# Patient Record
Sex: FEMALE | Race: BLACK OR AFRICAN AMERICAN | NOT HISPANIC OR LATINO | Employment: UNEMPLOYED | URBAN - METROPOLITAN AREA
[De-identification: names, ages, dates, MRNs, and addresses within clinical notes are randomized per-mention and may not be internally consistent; named-entity substitution may affect disease eponyms.]

---

## 2017-01-04 ENCOUNTER — HISTORICAL (OUTPATIENT)
Dept: RADIOLOGY | Facility: HOSPITAL | Age: 57
End: 2017-01-04

## 2017-01-26 ENCOUNTER — HISTORICAL (OUTPATIENT)
Dept: WOUND CARE | Facility: HOSPITAL | Age: 57
End: 2017-01-26

## 2017-02-17 ENCOUNTER — HISTORICAL (OUTPATIENT)
Dept: WOUND CARE | Facility: HOSPITAL | Age: 57
End: 2017-02-17

## 2017-07-11 ENCOUNTER — HISTORICAL (OUTPATIENT)
Dept: ADMINISTRATIVE | Facility: HOSPITAL | Age: 57
End: 2017-07-11

## 2017-07-11 LAB
BUN SERPL-MCNC: 16 MG/DL (ref 7–18)
CALCIUM SERPL-MCNC: 9.2 MG/DL (ref 8.5–10.1)
CHLORIDE SERPL-SCNC: 110 MMOL/L (ref 98–107)
CO2 SERPL-SCNC: 26 MMOL/L (ref 21–32)
CREAT SERPL-MCNC: 1.1 MG/DL (ref 0.6–1.3)
GLUCOSE SERPL-MCNC: 91 MG/DL (ref 74–106)
POTASSIUM SERPL-SCNC: 4 MMOL/L (ref 3.5–5.1)
SODIUM SERPL-SCNC: 142 MMOL/L (ref 136–145)
T3FREE SERPL-MCNC: 2.47 PG/ML (ref 2.18–3.98)
T4 FREE SERPL-MCNC: 1 NG/DL (ref 0.76–1.46)
TSH SERPL-ACNC: 1.07 MIU/L (ref 0.36–3.74)

## 2017-08-09 ENCOUNTER — HISTORICAL (OUTPATIENT)
Dept: INTERNAL MEDICINE | Facility: CLINIC | Age: 57
End: 2017-08-09

## 2017-09-18 ENCOUNTER — HISTORICAL (OUTPATIENT)
Dept: ADMINISTRATIVE | Facility: HOSPITAL | Age: 57
End: 2017-09-18

## 2017-09-18 LAB — TSH SERPL-ACNC: 0.7 MIU/L (ref 0.36–3.74)

## 2017-12-18 ENCOUNTER — HISTORICAL (OUTPATIENT)
Dept: CARDIOLOGY | Facility: HOSPITAL | Age: 57
End: 2017-12-18

## 2017-12-18 LAB
ABS NEUT (OLG): 3.86 X10(3)/MCL (ref 2.1–9.2)
ALBUMIN SERPL-MCNC: 3.5 GM/DL (ref 3.4–5)
ALBUMIN/GLOB SERPL: 1 RATIO (ref 1–2)
ALP SERPL-CCNC: 93 UNIT/L (ref 45–117)
ALT SERPL-CCNC: 22 UNIT/L (ref 12–78)
AST SERPL-CCNC: 10 UNIT/L (ref 15–37)
BASOPHILS # BLD AUTO: 0.03 X10(3)/MCL
BASOPHILS NFR BLD AUTO: 0 % (ref 0–1)
BILIRUB SERPL-MCNC: 0.3 MG/DL (ref 0.2–1)
BILIRUBIN DIRECT+TOT PNL SERPL-MCNC: 0.1 MG/DL
BILIRUBIN DIRECT+TOT PNL SERPL-MCNC: 0.2 MG/DL
BUN SERPL-MCNC: 14 MG/DL (ref 7–18)
CALCIUM SERPL-MCNC: 9 MG/DL (ref 8.5–10.1)
CHLORIDE SERPL-SCNC: 110 MMOL/L (ref 98–107)
CHOLEST SERPL-MCNC: 164 MG/DL
CHOLEST/HDLC SERPL: 3.7 {RATIO} (ref 0–4.4)
CO2 SERPL-SCNC: 25 MMOL/L (ref 21–32)
CREAT SERPL-MCNC: 0.9 MG/DL (ref 0.6–1.3)
EOSINOPHIL # BLD AUTO: 0.24 10*3/UL
EOSINOPHIL NFR BLD AUTO: 3 % (ref 0–5)
ERYTHROCYTE [DISTWIDTH] IN BLOOD BY AUTOMATED COUNT: 16.1 % (ref 11.5–14.5)
GLOBULIN SER-MCNC: 4.2 GM/ML (ref 2.3–3.5)
GLUCOSE SERPL-MCNC: 109 MG/DL (ref 74–106)
HCT VFR BLD AUTO: 35.1 % (ref 35–46)
HDLC SERPL-MCNC: 44 MG/DL
HGB BLD-MCNC: 11.2 GM/DL (ref 12–16)
IMM GRANULOCYTES # BLD AUTO: 0.04 10*3/UL
IMM GRANULOCYTES NFR BLD AUTO: 0 %
LDLC SERPL CALC-MCNC: 100 MG/DL (ref 0–130)
LYMPHOCYTES # BLD AUTO: 3.28 X10(3)/MCL
LYMPHOCYTES NFR BLD AUTO: 41 % (ref 15–40)
MCH RBC QN AUTO: 25.2 PG (ref 26–34)
MCHC RBC AUTO-ENTMCNC: 31.9 GM/DL (ref 31–37)
MCV RBC AUTO: 79.1 FL (ref 80–100)
MONOCYTES # BLD AUTO: 0.58 X10(3)/MCL
MONOCYTES NFR BLD AUTO: 7 % (ref 4–12)
NEUTROPHILS # BLD AUTO: 3.86 X10(3)/MCL
NEUTROPHILS NFR BLD AUTO: 48 X10(3)/MCL
PLATELET # BLD AUTO: 308 X10(3)/MCL (ref 130–400)
PMV BLD AUTO: 10.8 FL (ref 7.4–10.4)
POTASSIUM SERPL-SCNC: 4 MMOL/L (ref 3.5–5.1)
PROT SERPL-MCNC: 7.7 GM/DL (ref 6.4–8.2)
RBC # BLD AUTO: 4.44 X10(6)/MCL (ref 4–5.2)
SODIUM SERPL-SCNC: 142 MMOL/L (ref 136–145)
T4 FREE SERPL-MCNC: 1.11 NG/DL (ref 0.76–1.46)
TRIGL SERPL-MCNC: 100 MG/DL
TSH SERPL-ACNC: 0.91 MIU/L (ref 0.36–3.74)
VLDLC SERPL CALC-MCNC: 20 MG/DL
WBC # SPEC AUTO: 8 X10(3)/MCL (ref 4.5–11)

## 2018-01-03 ENCOUNTER — HISTORICAL (OUTPATIENT)
Dept: ADMINISTRATIVE | Facility: HOSPITAL | Age: 58
End: 2018-01-03

## 2018-01-26 LAB — POC BETA-HCG (QUAL): NEGATIVE

## 2018-02-07 ENCOUNTER — HISTORICAL (OUTPATIENT)
Dept: WOUND CARE | Facility: HOSPITAL | Age: 58
End: 2018-02-07

## 2018-04-26 ENCOUNTER — HISTORICAL (OUTPATIENT)
Dept: ADMINISTRATIVE | Facility: HOSPITAL | Age: 58
End: 2018-04-26

## 2018-04-26 LAB
ALBUMIN SERPL-MCNC: 3.9 GM/DL (ref 3.4–5)
ALBUMIN/GLOB SERPL: 1 RATIO (ref 1–2)
ALP SERPL-CCNC: 105 UNIT/L (ref 45–117)
ALT SERPL-CCNC: 22 UNIT/L (ref 12–78)
AST SERPL-CCNC: 11 UNIT/L (ref 15–37)
BILIRUB SERPL-MCNC: 0.5 MG/DL (ref 0.2–1)
BILIRUBIN DIRECT+TOT PNL SERPL-MCNC: 0.1 MG/DL
BILIRUBIN DIRECT+TOT PNL SERPL-MCNC: 0.4 MG/DL
BUN SERPL-MCNC: 16 MG/DL (ref 7–18)
CALCIUM SERPL-MCNC: 8.9 MG/DL (ref 8.5–10.1)
CHLORIDE SERPL-SCNC: 110 MMOL/L (ref 98–107)
CO2 SERPL-SCNC: 26 MMOL/L (ref 21–32)
CREAT SERPL-MCNC: 1 MG/DL (ref 0.6–1.3)
ERYTHROCYTE [DISTWIDTH] IN BLOOD BY AUTOMATED COUNT: 16.2 % (ref 11.5–14.5)
GLOBULIN SER-MCNC: 4.5 GM/ML (ref 2.3–3.5)
GLUCOSE SERPL-MCNC: 78 MG/DL (ref 74–106)
HAV IGM SERPL QL IA: NONREACTIVE
HBV CORE IGM SERPL QL IA: NONREACTIVE
HBV SURFACE AG SERPL QL IA: NEGATIVE
HCT VFR BLD AUTO: 37.9 % (ref 35–46)
HCV AB SERPL QL IA: NONREACTIVE
HGB BLD-MCNC: 11.9 GM/DL (ref 12–16)
HIV 1+2 AB+HIV1 P24 AG SERPL QL IA: NONREACTIVE
MCH RBC QN AUTO: 25.2 PG (ref 26–34)
MCHC RBC AUTO-ENTMCNC: 31.4 GM/DL (ref 31–37)
MCV RBC AUTO: 80.1 FL (ref 80–100)
PLATELET # BLD AUTO: 290 X10(3)/MCL (ref 130–400)
PMV BLD AUTO: 11.5 FL (ref 7.4–10.4)
POTASSIUM SERPL-SCNC: 3.6 MMOL/L (ref 3.5–5.1)
PROT SERPL-MCNC: 8.4 GM/DL (ref 6.4–8.2)
RBC # BLD AUTO: 4.73 X10(6)/MCL (ref 4–5.2)
SODIUM SERPL-SCNC: 145 MMOL/L (ref 136–145)
T PALLIDUM AB SER QL: NONREACTIVE
T4 FREE SERPL-MCNC: 1.04 NG/DL (ref 0.76–1.46)
TSH SERPL-ACNC: 0.88 MIU/L (ref 0.36–3.74)
WBC # SPEC AUTO: 7.3 X10(3)/MCL (ref 4.5–11)

## 2018-05-01 ENCOUNTER — HISTORICAL (OUTPATIENT)
Dept: SURGERY | Facility: HOSPITAL | Age: 58
End: 2018-05-01

## 2018-05-01 LAB
B-HCG SERPL QL: NEGATIVE
POTASSIUM SERPL-SCNC: 3.7 MMOL/L (ref 3.5–5.1)

## 2018-08-01 ENCOUNTER — HISTORICAL (OUTPATIENT)
Dept: ADMINISTRATIVE | Facility: HOSPITAL | Age: 58
End: 2018-08-01

## 2018-08-01 LAB
ABS NEUT (OLG): 3.09 X10(3)/MCL (ref 2.1–9.2)
ALBUMIN SERPL-MCNC: 3.7 GM/DL (ref 3.4–5)
ALBUMIN/GLOB SERPL: 1 RATIO (ref 1–2)
ALP SERPL-CCNC: 110 UNIT/L (ref 45–117)
ALT SERPL-CCNC: 23 UNIT/L (ref 12–78)
AST SERPL-CCNC: 12 UNIT/L (ref 15–37)
BASOPHILS # BLD AUTO: 0.03 X10(3)/MCL
BASOPHILS NFR BLD AUTO: 0 %
BILIRUB SERPL-MCNC: 0.3 MG/DL (ref 0.2–1)
BILIRUBIN DIRECT+TOT PNL SERPL-MCNC: <0.1 MG/DL
BILIRUBIN DIRECT+TOT PNL SERPL-MCNC: ABNORMAL MG/DL
BUN SERPL-MCNC: 15 MG/DL (ref 7–18)
CALCIUM SERPL-MCNC: 8.9 MG/DL (ref 8.5–10.1)
CANCER AG125 SERPL-ACNC: 19.3 IU/ML (ref 1.5–35)
CHLORIDE SERPL-SCNC: 110 MMOL/L (ref 98–107)
CO2 SERPL-SCNC: 25 MMOL/L (ref 21–32)
CREAT SERPL-MCNC: 1 MG/DL (ref 0.6–1.3)
EOSINOPHIL # BLD AUTO: 0.16 10*3/UL
EOSINOPHIL NFR BLD AUTO: 3 %
ERYTHROCYTE [DISTWIDTH] IN BLOOD BY AUTOMATED COUNT: 16.4 % (ref 11.5–14.5)
GLOBULIN SER-MCNC: 4.5 GM/ML (ref 2.3–3.5)
GLUCOSE SERPL-MCNC: 107 MG/DL (ref 74–106)
HCT VFR BLD AUTO: 37 % (ref 35–46)
HGB BLD-MCNC: 11.5 GM/DL (ref 12–16)
IMM GRANULOCYTES # BLD AUTO: 0.02 10*3/UL
IMM GRANULOCYTES NFR BLD AUTO: 0 %
LYMPHOCYTES # BLD AUTO: 1.82 X10(3)/MCL
LYMPHOCYTES NFR BLD AUTO: 33 % (ref 13–40)
MCH RBC QN AUTO: 24.5 PG (ref 26–34)
MCHC RBC AUTO-ENTMCNC: 31.1 GM/DL (ref 31–37)
MCV RBC AUTO: 78.7 FL (ref 80–100)
MONOCYTES # BLD AUTO: 0.44 X10(3)/MCL
MONOCYTES NFR BLD AUTO: 8 % (ref 4–12)
NEUTROPHILS # BLD AUTO: 3.09 X10(3)/MCL
NEUTROPHILS NFR BLD AUTO: 56 X10(3)/MCL
PLATELET # BLD AUTO: 309 X10(3)/MCL (ref 130–400)
PMV BLD AUTO: 10.7 FL (ref 7.4–10.4)
POTASSIUM SERPL-SCNC: 4.1 MMOL/L (ref 3.5–5.1)
PROT SERPL-MCNC: 8.2 GM/DL (ref 6.4–8.2)
RBC # BLD AUTO: 4.7 X10(6)/MCL (ref 4–5.2)
SODIUM SERPL-SCNC: 141 MMOL/L (ref 136–145)
WBC # SPEC AUTO: 5.6 X10(3)/MCL (ref 4.5–11)

## 2018-08-14 ENCOUNTER — HISTORICAL (OUTPATIENT)
Dept: RADIATION THERAPY | Facility: HOSPITAL | Age: 58
End: 2018-08-14

## 2018-08-15 ENCOUNTER — HISTORICAL (OUTPATIENT)
Dept: ADMINISTRATIVE | Facility: HOSPITAL | Age: 58
End: 2018-08-15

## 2018-08-15 ENCOUNTER — HISTORICAL (OUTPATIENT)
Dept: RADIOLOGY | Facility: HOSPITAL | Age: 58
End: 2018-08-15

## 2018-08-17 ENCOUNTER — HISTORICAL (OUTPATIENT)
Dept: RADIATION THERAPY | Facility: HOSPITAL | Age: 58
End: 2018-08-17

## 2018-08-20 ENCOUNTER — HISTORICAL (OUTPATIENT)
Dept: RADIATION THERAPY | Facility: HOSPITAL | Age: 58
End: 2018-08-20

## 2018-08-22 ENCOUNTER — HISTORICAL (OUTPATIENT)
Dept: SURGERY | Facility: HOSPITAL | Age: 58
End: 2018-08-22

## 2018-08-22 LAB — POTASSIUM SERPL-SCNC: 4.3 MMOL/L (ref 3.5–5.1)

## 2018-08-28 ENCOUNTER — HISTORICAL (OUTPATIENT)
Dept: ADMINISTRATIVE | Facility: HOSPITAL | Age: 58
End: 2018-08-28

## 2018-08-28 LAB
ABS NEUT (OLG): 2.82 X10(3)/MCL (ref 2.1–9.2)
ALBUMIN SERPL-MCNC: 3.9 GM/DL (ref 3.4–5)
ALBUMIN/GLOB SERPL: 1 RATIO (ref 1–2)
ALP SERPL-CCNC: 109 UNIT/L (ref 45–117)
ALT SERPL-CCNC: 22 UNIT/L (ref 12–78)
AST SERPL-CCNC: 10 UNIT/L (ref 15–37)
BASOPHILS # BLD AUTO: 0.02 X10(3)/MCL
BASOPHILS NFR BLD AUTO: 0 %
BILIRUB SERPL-MCNC: 0.4 MG/DL (ref 0.2–1)
BILIRUBIN DIRECT+TOT PNL SERPL-MCNC: <0.1 MG/DL
BILIRUBIN DIRECT+TOT PNL SERPL-MCNC: ABNORMAL MG/DL
BUN SERPL-MCNC: 17 MG/DL (ref 7–18)
CALCIUM SERPL-MCNC: 9.3 MG/DL (ref 8.5–10.1)
CHLORIDE SERPL-SCNC: 107 MMOL/L (ref 98–107)
CO2 SERPL-SCNC: 27 MMOL/L (ref 21–32)
CREAT SERPL-MCNC: 1.1 MG/DL (ref 0.6–1.3)
EOSINOPHIL # BLD AUTO: 0.15 10*3/UL
EOSINOPHIL NFR BLD AUTO: 3 %
ERYTHROCYTE [DISTWIDTH] IN BLOOD BY AUTOMATED COUNT: 17 % (ref 11.5–14.5)
GLOBULIN SER-MCNC: 4.3 GM/ML (ref 2.3–3.5)
GLUCOSE SERPL-MCNC: 103 MG/DL (ref 74–106)
HCT VFR BLD AUTO: 38 % (ref 35–46)
HGB BLD-MCNC: 12 GM/DL (ref 12–16)
IMM GRANULOCYTES # BLD AUTO: 0.02 10*3/UL
IMM GRANULOCYTES NFR BLD AUTO: 0 %
LYMPHOCYTES # BLD AUTO: 1.69 X10(3)/MCL
LYMPHOCYTES NFR BLD AUTO: 32 % (ref 13–40)
MCH RBC QN AUTO: 24.8 PG (ref 26–34)
MCHC RBC AUTO-ENTMCNC: 31.6 GM/DL (ref 31–37)
MCV RBC AUTO: 78.5 FL (ref 80–100)
MONOCYTES # BLD AUTO: 0.55 X10(3)/MCL
MONOCYTES NFR BLD AUTO: 10 % (ref 4–12)
NEUTROPHILS # BLD AUTO: 2.82 X10(3)/MCL
NEUTROPHILS NFR BLD AUTO: 54 X10(3)/MCL
PLATELET # BLD AUTO: 273 X10(3)/MCL (ref 130–400)
PMV BLD AUTO: 11 FL (ref 7.4–10.4)
POTASSIUM SERPL-SCNC: 3.9 MMOL/L (ref 3.5–5.1)
PROT SERPL-MCNC: 8.2 GM/DL (ref 6.4–8.2)
RBC # BLD AUTO: 4.84 X10(6)/MCL (ref 4–5.2)
SODIUM SERPL-SCNC: 138 MMOL/L (ref 136–145)
WBC # SPEC AUTO: 5.2 X10(3)/MCL (ref 4.5–11)

## 2018-08-29 ENCOUNTER — HISTORICAL (OUTPATIENT)
Dept: INFUSION THERAPY | Facility: HOSPITAL | Age: 58
End: 2018-08-29

## 2018-09-06 ENCOUNTER — HISTORICAL (OUTPATIENT)
Dept: ADMINISTRATIVE | Facility: HOSPITAL | Age: 58
End: 2018-09-06

## 2018-09-06 LAB
ABS NEUT (OLG): 1.28 X10(3)/MCL (ref 2.1–9.2)
ALBUMIN SERPL-MCNC: 3.5 GM/DL (ref 3.4–5)
ALBUMIN/GLOB SERPL: 1 RATIO (ref 1–2)
ALP SERPL-CCNC: 110 UNIT/L (ref 45–117)
ALT SERPL-CCNC: 26 UNIT/L (ref 12–78)
AST SERPL-CCNC: 11 UNIT/L (ref 15–37)
BASOPHILS # BLD AUTO: 0.02 X10(3)/MCL
BASOPHILS NFR BLD AUTO: 1 %
BILIRUB SERPL-MCNC: 0.3 MG/DL (ref 0.2–1)
BILIRUBIN DIRECT+TOT PNL SERPL-MCNC: <0.1 MG/DL
BILIRUBIN DIRECT+TOT PNL SERPL-MCNC: ABNORMAL MG/DL
BUN SERPL-MCNC: 21 MG/DL (ref 7–18)
CALCIUM SERPL-MCNC: 8.7 MG/DL (ref 8.5–10.1)
CHLORIDE SERPL-SCNC: 109 MMOL/L (ref 98–107)
CO2 SERPL-SCNC: 25 MMOL/L (ref 21–32)
CREAT SERPL-MCNC: 1 MG/DL (ref 0.6–1.3)
EOSINOPHIL # BLD AUTO: 0.03 X10(3)/MCL
EOSINOPHIL NFR BLD AUTO: 1 %
ERYTHROCYTE [DISTWIDTH] IN BLOOD BY AUTOMATED COUNT: 16.1 % (ref 11.5–14.5)
GLOBULIN SER-MCNC: 4.1 GM/ML (ref 2.3–3.5)
GLUCOSE SERPL-MCNC: 108 MG/DL (ref 74–106)
HCT VFR BLD AUTO: 34.4 % (ref 35–46)
HGB BLD-MCNC: 10.9 GM/DL (ref 12–16)
IMM GRANULOCYTES # BLD AUTO: 0.01 10*3/UL
IMM GRANULOCYTES NFR BLD AUTO: 0 %
LYMPHOCYTES # BLD AUTO: 1.2 X10(3)/MCL
LYMPHOCYTES NFR BLD AUTO: 45 % (ref 13–40)
MCH RBC QN AUTO: 24.6 PG (ref 26–34)
MCHC RBC AUTO-ENTMCNC: 31.7 GM/DL (ref 31–37)
MCV RBC AUTO: 77.7 FL (ref 80–100)
MONOCYTES # BLD AUTO: 0.12 X10(3)/MCL
MONOCYTES NFR BLD AUTO: 4 % (ref 4–12)
NEUTROPHILS # BLD AUTO: 1.28 X10(3)/MCL
NEUTROPHILS NFR BLD AUTO: 48 X10(3)/MCL
PLATELET # BLD AUTO: 214 X10(3)/MCL (ref 130–400)
PMV BLD AUTO: 10.8 FL (ref 7.4–10.4)
POTASSIUM SERPL-SCNC: 4.2 MMOL/L (ref 3.5–5.1)
PROT SERPL-MCNC: 7.6 GM/DL (ref 6.4–8.2)
RBC # BLD AUTO: 4.43 X10(6)/MCL (ref 4–5.2)
SODIUM SERPL-SCNC: 139 MMOL/L (ref 136–145)
WBC # SPEC AUTO: 2.7 X10(3)/MCL (ref 4.5–11)

## 2018-09-19 ENCOUNTER — HISTORICAL (OUTPATIENT)
Dept: INFUSION THERAPY | Facility: HOSPITAL | Age: 58
End: 2018-09-19

## 2018-09-19 LAB
ABS NEUT (OLG): 2.93 X10(3)/MCL (ref 2.1–9.2)
ALBUMIN SERPL-MCNC: 3.4 GM/DL (ref 3.4–5)
ALBUMIN/GLOB SERPL: 1 RATIO (ref 1–2)
ALP SERPL-CCNC: 128 UNIT/L (ref 45–117)
ALT SERPL-CCNC: 22 UNIT/L (ref 12–78)
AST SERPL-CCNC: 9 UNIT/L (ref 15–37)
BASOPHILS # BLD AUTO: 0.02 X10(3)/MCL
BASOPHILS NFR BLD AUTO: 0 %
BILIRUB SERPL-MCNC: 0.3 MG/DL (ref 0.2–1)
BILIRUBIN DIRECT+TOT PNL SERPL-MCNC: <0.1 MG/DL
BILIRUBIN DIRECT+TOT PNL SERPL-MCNC: ABNORMAL MG/DL
BUN SERPL-MCNC: 16 MG/DL (ref 7–18)
CALCIUM SERPL-MCNC: 8.9 MG/DL (ref 8.5–10.1)
CHLORIDE SERPL-SCNC: 110 MMOL/L (ref 98–107)
CO2 SERPL-SCNC: 26 MMOL/L (ref 21–32)
CREAT SERPL-MCNC: 0.9 MG/DL (ref 0.6–1.3)
EOSINOPHIL # BLD AUTO: 0.05 X10(3)/MCL
EOSINOPHIL NFR BLD AUTO: 1 %
ERYTHROCYTE [DISTWIDTH] IN BLOOD BY AUTOMATED COUNT: 18.5 % (ref 11.5–14.5)
GLOBULIN SER-MCNC: 4.5 GM/ML (ref 2.3–3.5)
GLUCOSE SERPL-MCNC: 124 MG/DL (ref 74–106)
HCT VFR BLD AUTO: 35.5 % (ref 35–46)
HGB BLD-MCNC: 11 GM/DL (ref 12–16)
IMM GRANULOCYTES # BLD AUTO: 0.03 10*3/UL
IMM GRANULOCYTES NFR BLD AUTO: 1 %
LYMPHOCYTES # BLD AUTO: 1.64 X10(3)/MCL
LYMPHOCYTES NFR BLD AUTO: 31 % (ref 13–40)
MCH RBC QN AUTO: 24.7 PG (ref 26–34)
MCHC RBC AUTO-ENTMCNC: 31 GM/DL (ref 31–37)
MCV RBC AUTO: 79.6 FL (ref 80–100)
MONOCYTES # BLD AUTO: 0.61 X10(3)/MCL
MONOCYTES NFR BLD AUTO: 12 % (ref 4–12)
NEUTROPHILS # BLD AUTO: 2.93 X10(3)/MCL
NEUTROPHILS NFR BLD AUTO: 55 X10(3)/MCL
PLATELET # BLD AUTO: 207 X10(3)/MCL (ref 130–400)
PMV BLD AUTO: 9.9 FL (ref 7.4–10.4)
POTASSIUM SERPL-SCNC: 4.1 MMOL/L (ref 3.5–5.1)
PROT SERPL-MCNC: 7.9 GM/DL (ref 6.4–8.2)
RBC # BLD AUTO: 4.46 X10(6)/MCL (ref 4–5.2)
SODIUM SERPL-SCNC: 142 MMOL/L (ref 136–145)
WBC # SPEC AUTO: 5.3 X10(3)/MCL (ref 4.5–11)

## 2018-10-09 ENCOUNTER — HISTORICAL (OUTPATIENT)
Dept: ADMINISTRATIVE | Facility: HOSPITAL | Age: 58
End: 2018-10-09

## 2018-10-09 LAB
ABS NEUT (OLG): 2.99 X10(3)/MCL (ref 2.1–9.2)
ALBUMIN SERPL-MCNC: 3.4 GM/DL (ref 3.4–5)
ALBUMIN/GLOB SERPL: 1 RATIO (ref 1–2)
ALP SERPL-CCNC: 121 UNIT/L (ref 45–117)
ALT SERPL-CCNC: 27 UNIT/L (ref 12–78)
AST SERPL-CCNC: 14 UNIT/L (ref 15–37)
BASOPHILS # BLD AUTO: 0.03 X10(3)/MCL
BASOPHILS NFR BLD AUTO: 0 %
BILIRUB SERPL-MCNC: 0.4 MG/DL (ref 0.2–1)
BILIRUBIN DIRECT+TOT PNL SERPL-MCNC: <0.1 MG/DL
BILIRUBIN DIRECT+TOT PNL SERPL-MCNC: ABNORMAL MG/DL
BUN SERPL-MCNC: 15 MG/DL (ref 7–18)
CALCIUM SERPL-MCNC: 8.8 MG/DL (ref 8.5–10.1)
CHLORIDE SERPL-SCNC: 109 MMOL/L (ref 98–107)
CHOLEST SERPL-MCNC: 196 MG/DL
CHOLEST/HDLC SERPL: 4.8 {RATIO} (ref 0–4.4)
CO2 SERPL-SCNC: 25 MMOL/L (ref 21–32)
CREAT SERPL-MCNC: 0.9 MG/DL (ref 0.6–1.3)
EOSINOPHIL # BLD AUTO: 0.05 X10(3)/MCL
EOSINOPHIL NFR BLD AUTO: 1 %
ERYTHROCYTE [DISTWIDTH] IN BLOOD BY AUTOMATED COUNT: 19.9 % (ref 11.5–14.5)
GLOBULIN SER-MCNC: 4.4 GM/ML (ref 2.3–3.5)
GLUCOSE SERPL-MCNC: 125 MG/DL (ref 74–106)
HCT VFR BLD AUTO: 32.8 % (ref 35–46)
HDLC SERPL-MCNC: 41 MG/DL
HGB BLD-MCNC: 10.5 GM/DL (ref 12–16)
IMM GRANULOCYTES # BLD AUTO: 0.02 10*3/UL
IMM GRANULOCYTES NFR BLD AUTO: 0 %
LDLC SERPL CALC-MCNC: 117 MG/DL (ref 0–130)
LYMPHOCYTES # BLD AUTO: 1.76 X10(3)/MCL
LYMPHOCYTES NFR BLD AUTO: 32 % (ref 13–40)
MCH RBC QN AUTO: 25.6 PG (ref 26–34)
MCHC RBC AUTO-ENTMCNC: 32 GM/DL (ref 31–37)
MCV RBC AUTO: 80 FL (ref 80–100)
MONOCYTES # BLD AUTO: 0.61 X10(3)/MCL
MONOCYTES NFR BLD AUTO: 11 % (ref 4–12)
NEUTROPHILS # BLD AUTO: 2.99 X10(3)/MCL
NEUTROPHILS NFR BLD AUTO: 55 X10(3)/MCL
PLATELET # BLD AUTO: 249 X10(3)/MCL (ref 130–400)
PMV BLD AUTO: 9.4 FL (ref 7.4–10.4)
POTASSIUM SERPL-SCNC: 3.9 MMOL/L (ref 3.5–5.1)
PROT SERPL-MCNC: 7.8 GM/DL (ref 6.4–8.2)
RBC # BLD AUTO: 4.1 X10(6)/MCL (ref 4–5.2)
SODIUM SERPL-SCNC: 139 MMOL/L (ref 136–145)
TRIGL SERPL-MCNC: 190 MG/DL
VLDLC SERPL CALC-MCNC: 38 MG/DL
WBC # SPEC AUTO: 5.5 X10(3)/MCL (ref 4.5–11)

## 2018-10-10 ENCOUNTER — HISTORICAL (OUTPATIENT)
Dept: INFUSION THERAPY | Facility: HOSPITAL | Age: 58
End: 2018-10-10

## 2018-10-30 ENCOUNTER — HISTORICAL (OUTPATIENT)
Dept: ADMINISTRATIVE | Facility: HOSPITAL | Age: 58
End: 2018-10-30

## 2018-10-30 LAB
ABS NEUT (OLG): 3.38 X10(3)/MCL (ref 2.1–9.2)
ALBUMIN SERPL-MCNC: 3.6 GM/DL (ref 3.4–5)
ALBUMIN/GLOB SERPL: 1 RATIO (ref 1–2)
ALP SERPL-CCNC: 126 UNIT/L (ref 45–117)
ALT SERPL-CCNC: 23 UNIT/L (ref 12–78)
AST SERPL-CCNC: 12 UNIT/L (ref 15–37)
BASOPHILS # BLD AUTO: 0.02 X10(3)/MCL
BASOPHILS NFR BLD AUTO: 0 %
BILIRUB SERPL-MCNC: 0.3 MG/DL (ref 0.2–1)
BILIRUBIN DIRECT+TOT PNL SERPL-MCNC: 0.1 MG/DL
BILIRUBIN DIRECT+TOT PNL SERPL-MCNC: 0.2 MG/DL
BUN SERPL-MCNC: 17 MG/DL (ref 7–18)
CALCIUM SERPL-MCNC: 9.1 MG/DL (ref 8.5–10.1)
CHLORIDE SERPL-SCNC: 107 MMOL/L (ref 98–107)
CO2 SERPL-SCNC: 26 MMOL/L (ref 21–32)
CREAT SERPL-MCNC: 1.2 MG/DL (ref 0.6–1.3)
EOSINOPHIL # BLD AUTO: 0.03 X10(3)/MCL
EOSINOPHIL NFR BLD AUTO: 0 %
ERYTHROCYTE [DISTWIDTH] IN BLOOD BY AUTOMATED COUNT: 19.6 % (ref 11.5–14.5)
GLOBULIN SER-MCNC: 4.7 GM/ML (ref 2.3–3.5)
GLUCOSE SERPL-MCNC: 108 MG/DL (ref 74–106)
HCT VFR BLD AUTO: 32.9 % (ref 35–46)
HGB BLD-MCNC: 10.4 GM/DL (ref 12–16)
IMM GRANULOCYTES # BLD AUTO: 0.02 10*3/UL
IMM GRANULOCYTES NFR BLD AUTO: 0 %
LYMPHOCYTES # BLD AUTO: 1.61 X10(3)/MCL
LYMPHOCYTES NFR BLD AUTO: 28 % (ref 13–40)
MCH RBC QN AUTO: 26.3 PG (ref 26–34)
MCHC RBC AUTO-ENTMCNC: 31.6 GM/DL (ref 31–37)
MCV RBC AUTO: 83.3 FL (ref 80–100)
MONOCYTES # BLD AUTO: 0.68 X10(3)/MCL
MONOCYTES NFR BLD AUTO: 12 % (ref 4–12)
NEUTROPHILS # BLD AUTO: 3.38 X10(3)/MCL
NEUTROPHILS NFR BLD AUTO: 59 X10(3)/MCL
PLATELET # BLD AUTO: 241 X10(3)/MCL (ref 130–400)
PMV BLD AUTO: 9.2 FL (ref 7.4–10.4)
POTASSIUM SERPL-SCNC: 3.8 MMOL/L (ref 3.5–5.1)
PROT SERPL-MCNC: 8.3 GM/DL (ref 6.4–8.2)
RBC # BLD AUTO: 3.95 X10(6)/MCL (ref 4–5.2)
SODIUM SERPL-SCNC: 140 MMOL/L (ref 136–145)
WBC # SPEC AUTO: 5.7 X10(3)/MCL (ref 4.5–11)

## 2018-10-31 ENCOUNTER — HISTORICAL (OUTPATIENT)
Dept: INFUSION THERAPY | Facility: HOSPITAL | Age: 58
End: 2018-10-31

## 2018-11-09 ENCOUNTER — HISTORICAL (OUTPATIENT)
Dept: HEMATOLOGY/ONCOLOGY | Facility: CLINIC | Age: 58
End: 2018-11-09

## 2018-11-09 LAB
ABS NEUT (OLG): 0.81 X10(3)/MCL (ref 2.1–9.2)
ALBUMIN SERPL-MCNC: 3.5 GM/DL (ref 3.4–5)
ALBUMIN/GLOB SERPL: 1 RATIO (ref 1–2)
ALP SERPL-CCNC: 122 UNIT/L (ref 45–117)
ALT SERPL-CCNC: 30 UNIT/L (ref 12–78)
ANISOCYTOSIS BLD QL SMEAR: ABNORMAL
AST SERPL-CCNC: 13 UNIT/L (ref 15–37)
BASOPHILS NFR BLD MANUAL: 0 %
BILIRUB SERPL-MCNC: 0.4 MG/DL (ref 0.2–1)
BILIRUBIN DIRECT+TOT PNL SERPL-MCNC: <0.1 MG/DL
BILIRUBIN DIRECT+TOT PNL SERPL-MCNC: ABNORMAL MG/DL
BUN SERPL-MCNC: 19 MG/DL (ref 7–18)
CALCIUM SERPL-MCNC: 9.4 MG/DL (ref 8.5–10.1)
CHLORIDE SERPL-SCNC: 108 MMOL/L (ref 98–107)
CO2 SERPL-SCNC: 26 MMOL/L (ref 21–32)
CREAT SERPL-MCNC: 0.9 MG/DL (ref 0.6–1.3)
EOSINOPHIL NFR BLD MANUAL: 0 %
ERYTHROCYTE [DISTWIDTH] IN BLOOD BY AUTOMATED COUNT: 18.4 % (ref 11.5–14.5)
GLOBULIN SER-MCNC: 4.5 GM/ML (ref 2.3–3.5)
GLUCOSE SERPL-MCNC: 111 MG/DL (ref 74–106)
GRANULOCYTES NFR BLD MANUAL: 27 % (ref 43–75)
HCT VFR BLD AUTO: 30.6 % (ref 35–46)
HGB BLD-MCNC: 9.8 GM/DL (ref 12–16)
LYMPHOCYTES NFR BLD MANUAL: 5 %
LYMPHOCYTES NFR BLD MANUAL: 57 % (ref 20.5–51.1)
MCH RBC QN AUTO: 27 PG (ref 26–34)
MCHC RBC AUTO-ENTMCNC: 32 GM/DL (ref 31–37)
MCV RBC AUTO: 84.3 FL (ref 80–100)
MONOCYTES NFR BLD MANUAL: 9 % (ref 2–9)
NEUTS BAND NFR BLD MANUAL: 2 % (ref 0–10)
PLATELET # BLD AUTO: 194 X10(3)/MCL (ref 130–400)
PLATELET # BLD EST: ADEQUATE 10*3/UL
PMV BLD AUTO: 10 FL (ref 7.4–10.4)
POLYCHROMASIA BLD QL SMEAR: ABNORMAL
POTASSIUM SERPL-SCNC: 4.1 MMOL/L (ref 3.5–5.1)
PROT SERPL-MCNC: 8 GM/DL (ref 6.4–8.2)
RBC # BLD AUTO: 3.63 X10(6)/MCL (ref 4–5.2)
RBC MORPH BLD: ABNORMAL
SODIUM SERPL-SCNC: 141 MMOL/L (ref 136–145)
WBC # SPEC AUTO: 2.4 X10(3)/MCL (ref 4.5–11)

## 2018-11-30 ENCOUNTER — HISTORICAL (OUTPATIENT)
Dept: ADMINISTRATIVE | Facility: HOSPITAL | Age: 58
End: 2018-11-30

## 2018-11-30 LAB
ABS NEUT (OLG): 2.7 X10(3)/MCL (ref 2.1–9.2)
ALBUMIN SERPL-MCNC: 3.7 GM/DL (ref 3.4–5)
ALBUMIN/GLOB SERPL: 1 RATIO (ref 1–2)
ALP SERPL-CCNC: 125 UNIT/L (ref 45–117)
ALT SERPL-CCNC: 23 UNIT/L (ref 12–78)
AST SERPL-CCNC: 11 UNIT/L (ref 15–37)
BASOPHILS # BLD AUTO: 0.01 X10(3)/MCL
BASOPHILS NFR BLD AUTO: 0 %
BILIRUB SERPL-MCNC: 0.4 MG/DL (ref 0.2–1)
BILIRUBIN DIRECT+TOT PNL SERPL-MCNC: 0.1 MG/DL
BILIRUBIN DIRECT+TOT PNL SERPL-MCNC: 0.3 MG/DL
BUN SERPL-MCNC: 26 MG/DL (ref 7–18)
CALCIUM SERPL-MCNC: 9.4 MG/DL (ref 8.5–10.1)
CHLORIDE SERPL-SCNC: 108 MMOL/L (ref 98–107)
CO2 SERPL-SCNC: 26 MMOL/L (ref 21–32)
CREAT SERPL-MCNC: 1.2 MG/DL (ref 0.6–1.3)
EOSINOPHIL # BLD AUTO: 0.06 X10(3)/MCL
EOSINOPHIL NFR BLD AUTO: 1 %
ERYTHROCYTE [DISTWIDTH] IN BLOOD BY AUTOMATED COUNT: 19.3 % (ref 11.5–14.5)
GLOBULIN SER-MCNC: 4.7 GM/ML (ref 2.3–3.5)
GLUCOSE SERPL-MCNC: 131 MG/DL (ref 74–106)
HCT VFR BLD AUTO: 32.8 % (ref 35–46)
HGB BLD-MCNC: 10.3 GM/DL (ref 12–16)
IMM GRANULOCYTES # BLD AUTO: 0.04 10*3/UL
IMM GRANULOCYTES NFR BLD AUTO: 1 %
LYMPHOCYTES # BLD AUTO: 1.52 X10(3)/MCL
LYMPHOCYTES NFR BLD AUTO: 30 % (ref 13–40)
MCH RBC QN AUTO: 27.5 PG (ref 26–34)
MCHC RBC AUTO-ENTMCNC: 31.4 GM/DL (ref 31–37)
MCV RBC AUTO: 87.7 FL (ref 80–100)
MONOCYTES # BLD AUTO: 0.65 X10(3)/MCL
MONOCYTES NFR BLD AUTO: 13 % (ref 4–12)
NEUTROPHILS # BLD AUTO: 2.7 X10(3)/MCL
NEUTROPHILS NFR BLD AUTO: 54 X10(3)/MCL
PLATELET # BLD AUTO: 286 X10(3)/MCL (ref 130–400)
PMV BLD AUTO: 9.7 FL (ref 7.4–10.4)
POTASSIUM SERPL-SCNC: 3.9 MMOL/L (ref 3.5–5.1)
PROT SERPL-MCNC: 8.4 GM/DL (ref 6.4–8.2)
RBC # BLD AUTO: 3.74 X10(6)/MCL (ref 4–5.2)
SODIUM SERPL-SCNC: 140 MMOL/L (ref 136–145)
WBC # SPEC AUTO: 5 X10(3)/MCL (ref 4.5–11)

## 2018-12-11 ENCOUNTER — HISTORICAL (OUTPATIENT)
Dept: ADMINISTRATIVE | Facility: HOSPITAL | Age: 58
End: 2018-12-11

## 2018-12-11 LAB
ABS NEUT (OLG): 3.67 X10(3)/MCL (ref 2.1–9.2)
ALBUMIN SERPL-MCNC: 3.3 GM/DL (ref 3.4–5)
ALBUMIN/GLOB SERPL: 1 RATIO (ref 1–2)
ALP SERPL-CCNC: 130 UNIT/L (ref 45–117)
ALT SERPL-CCNC: 21 UNIT/L (ref 12–78)
AST SERPL-CCNC: 9 UNIT/L (ref 15–37)
BASOPHILS # BLD AUTO: 0.01 X10(3)/MCL
BASOPHILS NFR BLD AUTO: 0 %
BILIRUB SERPL-MCNC: 0.2 MG/DL (ref 0.2–1)
BILIRUBIN DIRECT+TOT PNL SERPL-MCNC: <0.1 MG/DL
BILIRUBIN DIRECT+TOT PNL SERPL-MCNC: ABNORMAL MG/DL
BUN SERPL-MCNC: 18 MG/DL (ref 7–18)
CALCIUM SERPL-MCNC: 8.9 MG/DL (ref 8.5–10.1)
CHLORIDE SERPL-SCNC: 111 MMOL/L (ref 98–107)
CO2 SERPL-SCNC: 27 MMOL/L (ref 21–32)
CREAT SERPL-MCNC: 1.1 MG/DL (ref 0.6–1.3)
EOSINOPHIL # BLD AUTO: 0.08 X10(3)/MCL
EOSINOPHIL NFR BLD AUTO: 1 %
ERYTHROCYTE [DISTWIDTH] IN BLOOD BY AUTOMATED COUNT: 17.9 % (ref 11.5–14.5)
GLOBULIN SER-MCNC: 4.4 GM/ML (ref 2.3–3.5)
GLUCOSE SERPL-MCNC: 126 MG/DL (ref 74–106)
HCT VFR BLD AUTO: 30.8 % (ref 35–46)
HGB BLD-MCNC: 9.6 GM/DL (ref 12–16)
IMM GRANULOCYTES # BLD AUTO: 0.03 10*3/UL
IMM GRANULOCYTES NFR BLD AUTO: 0 %
LYMPHOCYTES # BLD AUTO: 1.84 X10(3)/MCL
LYMPHOCYTES NFR BLD AUTO: 30 % (ref 13–40)
MCH RBC QN AUTO: 27.9 PG (ref 26–34)
MCHC RBC AUTO-ENTMCNC: 31.2 GM/DL (ref 31–37)
MCV RBC AUTO: 89.5 FL (ref 80–100)
MONOCYTES # BLD AUTO: 0.61 X10(3)/MCL
MONOCYTES NFR BLD AUTO: 10 % (ref 4–12)
NEUTROPHILS # BLD AUTO: 3.67 X10(3)/MCL
NEUTROPHILS NFR BLD AUTO: 59 X10(3)/MCL
PLATELET # BLD AUTO: 259 X10(3)/MCL (ref 130–400)
PMV BLD AUTO: 9.8 FL (ref 7.4–10.4)
POTASSIUM SERPL-SCNC: 3.9 MMOL/L (ref 3.5–5.1)
PROT SERPL-MCNC: 7.7 GM/DL (ref 6.4–8.2)
RBC # BLD AUTO: 3.44 X10(6)/MCL (ref 4–5.2)
SODIUM SERPL-SCNC: 141 MMOL/L (ref 136–145)
WBC # SPEC AUTO: 6.2 X10(3)/MCL (ref 4.5–11)

## 2019-01-02 ENCOUNTER — HISTORICAL (OUTPATIENT)
Dept: ADMINISTRATIVE | Facility: HOSPITAL | Age: 59
End: 2019-01-02

## 2019-01-02 LAB
ABS NEUT (OLG): 3.06 X10(3)/MCL (ref 2.1–9.2)
ALBUMIN SERPL-MCNC: 3.4 GM/DL (ref 3.4–5)
ALBUMIN/GLOB SERPL: 1 RATIO (ref 1–2)
ALP SERPL-CCNC: 127 UNIT/L (ref 45–117)
ALT SERPL-CCNC: 23 UNIT/L (ref 12–78)
AST SERPL-CCNC: 12 UNIT/L (ref 15–37)
BASOPHILS # BLD AUTO: 0.03 X10(3)/MCL
BASOPHILS NFR BLD AUTO: 1 %
BILIRUB SERPL-MCNC: 0.3 MG/DL (ref 0.2–1)
BILIRUBIN DIRECT+TOT PNL SERPL-MCNC: <0.1 MG/DL
BILIRUBIN DIRECT+TOT PNL SERPL-MCNC: >0.2 MG/DL
BUN SERPL-MCNC: 16 MG/DL (ref 7–18)
CALCIUM SERPL-MCNC: 8.8 MG/DL (ref 8.5–10.1)
CHLORIDE SERPL-SCNC: 108 MMOL/L (ref 98–107)
CO2 SERPL-SCNC: 25 MMOL/L (ref 21–32)
CREAT SERPL-MCNC: 1.1 MG/DL (ref 0.6–1.3)
EOSINOPHIL # BLD AUTO: 0.11 10*3/UL
EOSINOPHIL NFR BLD AUTO: 2 %
ERYTHROCYTE [DISTWIDTH] IN BLOOD BY AUTOMATED COUNT: 15.9 % (ref 11.5–14.5)
GLOBULIN SER-MCNC: 4.3 GM/ML (ref 2.3–3.5)
GLUCOSE SERPL-MCNC: 165 MG/DL (ref 74–106)
HCT VFR BLD AUTO: 33.4 % (ref 35–46)
HGB BLD-MCNC: 10.2 GM/DL (ref 12–16)
IMM GRANULOCYTES # BLD AUTO: 0.01 10*3/UL
IMM GRANULOCYTES NFR BLD AUTO: 0 %
LYMPHOCYTES # BLD AUTO: 1.52 X10(3)/MCL
LYMPHOCYTES NFR BLD AUTO: 29 % (ref 13–40)
MCH RBC QN AUTO: 26.8 PG (ref 26–34)
MCHC RBC AUTO-ENTMCNC: 30.5 GM/DL (ref 31–37)
MCV RBC AUTO: 87.7 FL (ref 80–100)
MONOCYTES # BLD AUTO: 0.46 X10(3)/MCL
MONOCYTES NFR BLD AUTO: 9 % (ref 4–12)
NEUTROPHILS # BLD AUTO: 3.06 X10(3)/MCL
NEUTROPHILS NFR BLD AUTO: 59 X10(3)/MCL
PLATELET # BLD AUTO: 261 X10(3)/MCL (ref 130–400)
PMV BLD AUTO: 10.3 FL (ref 7.4–10.4)
POTASSIUM SERPL-SCNC: 3.7 MMOL/L (ref 3.5–5.1)
PROT SERPL-MCNC: 7.7 GM/DL (ref 6.4–8.2)
RBC # BLD AUTO: 3.81 X10(6)/MCL (ref 4–5.2)
SODIUM SERPL-SCNC: 141 MMOL/L (ref 136–145)
WBC # SPEC AUTO: 5.2 X10(3)/MCL (ref 4.5–11)

## 2019-01-03 ENCOUNTER — HISTORICAL (OUTPATIENT)
Dept: INFUSION THERAPY | Facility: HOSPITAL | Age: 59
End: 2019-01-03

## 2019-01-22 ENCOUNTER — HISTORICAL (OUTPATIENT)
Dept: ADMINISTRATIVE | Facility: HOSPITAL | Age: 59
End: 2019-01-22

## 2019-01-22 LAB
ABS NEUT (OLG): 3.35 X10(3)/MCL (ref 2.1–9.2)
ALBUMIN SERPL-MCNC: 3.6 GM/DL (ref 3.4–5)
ALBUMIN/GLOB SERPL: 0.78 RATIO (ref 1.1–2)
ALP SERPL-CCNC: 133 UNIT/L (ref 45–117)
ALT SERPL-CCNC: 22 UNIT/L (ref 12–78)
AST SERPL-CCNC: 12 UNIT/L (ref 15–37)
BASOPHILS # BLD AUTO: 0.01 X10(3)/MCL
BASOPHILS NFR BLD AUTO: 0 %
BILIRUB SERPL-MCNC: 0.4 MG/DL (ref 0.2–1)
BILIRUBIN DIRECT+TOT PNL SERPL-MCNC: <0.1 MG/DL
BILIRUBIN DIRECT+TOT PNL SERPL-MCNC: >0.3 MG/DL
BUN SERPL-MCNC: 20 MG/DL (ref 7–18)
CALCIUM SERPL-MCNC: 8.8 MG/DL (ref 8.5–10.1)
CHLORIDE SERPL-SCNC: 106 MMOL/L (ref 98–107)
CO2 SERPL-SCNC: 25 MMOL/L (ref 21–32)
CREAT SERPL-MCNC: 1.2 MG/DL (ref 0.6–1.3)
EOSINOPHIL # BLD AUTO: 0.04 10*3/UL
EOSINOPHIL NFR BLD AUTO: 1 %
ERYTHROCYTE [DISTWIDTH] IN BLOOD BY AUTOMATED COUNT: 17.2 % (ref 11.5–14.5)
GLOBULIN SER-MCNC: 4.6 GM/ML (ref 2.3–3.5)
GLUCOSE SERPL-MCNC: 131 MG/DL (ref 74–106)
HCT VFR BLD AUTO: 33.3 % (ref 35–46)
HGB BLD-MCNC: 10.3 GM/DL (ref 12–16)
IMM GRANULOCYTES # BLD AUTO: 0.02 10*3/UL
IMM GRANULOCYTES NFR BLD AUTO: 0 %
LYMPHOCYTES # BLD AUTO: 1.93 X10(3)/MCL
LYMPHOCYTES NFR BLD AUTO: 32 % (ref 13–40)
MCH RBC QN AUTO: 27.6 PG (ref 26–34)
MCHC RBC AUTO-ENTMCNC: 30.9 GM/DL (ref 31–37)
MCV RBC AUTO: 89.3 FL (ref 80–100)
MONOCYTES # BLD AUTO: 0.59 X10(3)/MCL
MONOCYTES NFR BLD AUTO: 10 % (ref 4–12)
NEUTROPHILS # BLD AUTO: 3.35 X10(3)/MCL
NEUTROPHILS NFR BLD AUTO: 56 X10(3)/MCL
PLATELET # BLD AUTO: 185 X10(3)/MCL (ref 130–400)
PMV BLD AUTO: 10 FL (ref 7.4–10.4)
POTASSIUM SERPL-SCNC: 4.1 MMOL/L (ref 3.5–5.1)
PROT SERPL-MCNC: 8.2 GM/DL (ref 6.4–8.2)
RBC # BLD AUTO: 3.73 X10(6)/MCL (ref 4–5.2)
SODIUM SERPL-SCNC: 137 MMOL/L (ref 136–145)
WBC # SPEC AUTO: 5.9 X10(3)/MCL (ref 4.5–11)

## 2019-01-24 ENCOUNTER — HISTORICAL (OUTPATIENT)
Dept: INFUSION THERAPY | Facility: HOSPITAL | Age: 59
End: 2019-01-24

## 2019-02-19 ENCOUNTER — HISTORICAL (OUTPATIENT)
Dept: ADMINISTRATIVE | Facility: HOSPITAL | Age: 59
End: 2019-02-19

## 2019-02-19 LAB
ABS NEUT (OLG): 2.38 X10(3)/MCL (ref 2.1–9.2)
ALBUMIN SERPL-MCNC: 3.7 GM/DL (ref 3.4–5)
ALBUMIN/GLOB SERPL: 0.8 RATIO (ref 1.1–2)
ALP SERPL-CCNC: 137 UNIT/L (ref 45–117)
ALT SERPL-CCNC: 29 UNIT/L (ref 12–78)
AST SERPL-CCNC: 16 UNIT/L (ref 15–37)
BASOPHILS # BLD AUTO: 0.02 X10(3)/MCL
BASOPHILS NFR BLD AUTO: 0 %
BILIRUB SERPL-MCNC: 0.3 MG/DL (ref 0.2–1)
BILIRUBIN DIRECT+TOT PNL SERPL-MCNC: 0.1 MG/DL
BILIRUBIN DIRECT+TOT PNL SERPL-MCNC: 0.2 MG/DL
BUN SERPL-MCNC: 25 MG/DL (ref 7–18)
CALCIUM SERPL-MCNC: 9 MG/DL (ref 8.5–10.1)
CHLORIDE SERPL-SCNC: 106 MMOL/L (ref 98–107)
CO2 SERPL-SCNC: 26 MMOL/L (ref 21–32)
CREAT SERPL-MCNC: 1.2 MG/DL (ref 0.6–1.3)
EOSINOPHIL # BLD AUTO: 0.03 X10(3)/MCL
EOSINOPHIL NFR BLD AUTO: 1 %
ERYTHROCYTE [DISTWIDTH] IN BLOOD BY AUTOMATED COUNT: 18 % (ref 11.5–14.5)
GLOBULIN SER-MCNC: 4.4 GM/ML (ref 2.3–3.5)
GLUCOSE SERPL-MCNC: 143 MG/DL (ref 74–106)
HCT VFR BLD AUTO: 32.4 % (ref 35–46)
HGB BLD-MCNC: 10.4 GM/DL (ref 12–16)
IMM GRANULOCYTES # BLD AUTO: 0.03 10*3/UL
IMM GRANULOCYTES NFR BLD AUTO: 1 %
LYMPHOCYTES # BLD AUTO: 2.39 X10(3)/MCL
LYMPHOCYTES NFR BLD AUTO: 44 % (ref 13–40)
MCH RBC QN AUTO: 28.4 PG (ref 26–34)
MCHC RBC AUTO-ENTMCNC: 32.1 GM/DL (ref 31–37)
MCV RBC AUTO: 88.5 FL (ref 80–100)
MONOCYTES # BLD AUTO: 0.57 X10(3)/MCL
MONOCYTES NFR BLD AUTO: 10 % (ref 4–12)
NEUTROPHILS # BLD AUTO: 2.38 X10(3)/MCL
NEUTROPHILS NFR BLD AUTO: 44 X10(3)/MCL
PLATELET # BLD AUTO: 190 X10(3)/MCL (ref 130–400)
PMV BLD AUTO: 9.4 FL (ref 7.4–10.4)
POTASSIUM SERPL-SCNC: 3.9 MMOL/L (ref 3.5–5.1)
PROT SERPL-MCNC: 8.1 GM/DL (ref 6.4–8.2)
RBC # BLD AUTO: 3.66 X10(6)/MCL (ref 4–5.2)
SODIUM SERPL-SCNC: 140 MMOL/L (ref 136–145)
WBC # SPEC AUTO: 5.4 X10(3)/MCL (ref 4.5–11)

## 2019-03-21 ENCOUNTER — HISTORICAL (OUTPATIENT)
Dept: INFUSION THERAPY | Facility: HOSPITAL | Age: 59
End: 2019-03-21

## 2019-05-10 ENCOUNTER — HISTORICAL (OUTPATIENT)
Dept: ENDOSCOPY | Facility: HOSPITAL | Age: 59
End: 2019-05-10

## 2019-05-10 LAB — CRC RECOMMENDATION EXT: NORMAL

## 2019-05-20 ENCOUNTER — HISTORICAL (OUTPATIENT)
Dept: INFUSION THERAPY | Facility: HOSPITAL | Age: 59
End: 2019-05-20

## 2019-07-15 ENCOUNTER — HISTORICAL (OUTPATIENT)
Dept: INFUSION THERAPY | Facility: HOSPITAL | Age: 59
End: 2019-07-15

## 2019-08-09 ENCOUNTER — HISTORICAL (OUTPATIENT)
Dept: RADIOLOGY | Facility: HOSPITAL | Age: 59
End: 2019-08-09

## 2019-09-09 ENCOUNTER — HISTORICAL (OUTPATIENT)
Dept: INFUSION THERAPY | Facility: HOSPITAL | Age: 59
End: 2019-09-09

## 2019-09-11 ENCOUNTER — HISTORICAL (OUTPATIENT)
Dept: ADMINISTRATIVE | Facility: HOSPITAL | Age: 59
End: 2019-09-11

## 2019-09-11 LAB
ABS NEUT (OLG): 3.38 X10(3)/MCL (ref 2.1–9.2)
ALBUMIN SERPL-MCNC: 3.6 GM/DL (ref 3.4–5)
ALBUMIN/GLOB SERPL: 0.8 RATIO (ref 1.1–2)
ALP SERPL-CCNC: 119 UNIT/L (ref 45–117)
ALT SERPL-CCNC: 22 UNIT/L (ref 12–78)
AST SERPL-CCNC: 9 UNIT/L (ref 15–37)
BASOPHILS # BLD AUTO: 0 X10(3)/MCL (ref 0–0.2)
BASOPHILS NFR BLD AUTO: 0 %
BILIRUB SERPL-MCNC: 0.4 MG/DL (ref 0.2–1)
BILIRUBIN DIRECT+TOT PNL SERPL-MCNC: <0.1 MG/DL (ref 0–0.2)
BILIRUBIN DIRECT+TOT PNL SERPL-MCNC: >0.3 MG/DL
BUN SERPL-MCNC: 18 MG/DL (ref 7–18)
CALCIUM SERPL-MCNC: 9.1 MG/DL (ref 8.5–10.1)
CHLORIDE SERPL-SCNC: 108 MMOL/L (ref 98–107)
CO2 SERPL-SCNC: 25 MMOL/L (ref 21–32)
CREAT SERPL-MCNC: 1.2 MG/DL (ref 0.6–1.3)
EOSINOPHIL # BLD AUTO: 0.1 X10(3)/MCL (ref 0–0.9)
EOSINOPHIL NFR BLD AUTO: 2 %
ERYTHROCYTE [DISTWIDTH] IN BLOOD BY AUTOMATED COUNT: 15.5 % (ref 11.5–14.5)
GLOBULIN SER-MCNC: 4.5 GM/ML (ref 2.3–3.5)
GLUCOSE SERPL-MCNC: 151 MG/DL (ref 74–106)
HCT VFR BLD AUTO: 37.4 % (ref 35–46)
HGB BLD-MCNC: 11.5 GM/DL (ref 12–16)
IMM GRANULOCYTES # BLD AUTO: 0.04 10*3/UL
IMM GRANULOCYTES NFR BLD AUTO: 1 %
LYMPHOCYTES # BLD AUTO: 1.7 X10(3)/MCL (ref 0.6–4.6)
LYMPHOCYTES NFR BLD AUTO: 30 %
MCH RBC QN AUTO: 26.3 PG (ref 26–34)
MCHC RBC AUTO-ENTMCNC: 30.7 GM/DL (ref 31–37)
MCV RBC AUTO: 85.6 FL (ref 80–100)
MONOCYTES # BLD AUTO: 0.5 X10(3)/MCL (ref 0.1–1.3)
MONOCYTES NFR BLD AUTO: 8 %
NEUTROPHILS # BLD AUTO: 3.38 X10(3)/MCL (ref 2.1–9.2)
NEUTROPHILS NFR BLD AUTO: 59 %
PLATELET # BLD AUTO: 295 X10(3)/MCL (ref 130–400)
PMV BLD AUTO: 10.2 FL (ref 7.4–10.4)
POTASSIUM SERPL-SCNC: 3.8 MMOL/L (ref 3.5–5.1)
PROT SERPL-MCNC: 8.1 GM/DL (ref 6.4–8.2)
RBC # BLD AUTO: 4.37 X10(6)/MCL (ref 4–5.2)
SODIUM SERPL-SCNC: 140 MMOL/L (ref 136–145)
WBC # SPEC AUTO: 5.8 X10(3)/MCL (ref 4.5–11)

## 2019-11-04 ENCOUNTER — HISTORICAL (OUTPATIENT)
Dept: INFUSION THERAPY | Facility: HOSPITAL | Age: 59
End: 2019-11-04

## 2019-11-07 ENCOUNTER — HISTORICAL (OUTPATIENT)
Dept: ADMINISTRATIVE | Facility: HOSPITAL | Age: 59
End: 2019-11-07

## 2019-11-07 LAB
ABS NEUT (OLG): 2.48 X10(3)/MCL (ref 2.1–9.2)
ALBUMIN SERPL-MCNC: 3.5 GM/DL (ref 3.4–5)
ALBUMIN/GLOB SERPL: 0.8 RATIO (ref 1.1–2)
ALP SERPL-CCNC: 123 UNIT/L (ref 45–117)
ALT SERPL-CCNC: 27 UNIT/L (ref 12–78)
AST SERPL-CCNC: 12 UNIT/L (ref 15–37)
BASOPHILS # BLD AUTO: 0 X10(3)/MCL (ref 0–0.2)
BASOPHILS NFR BLD AUTO: 0 %
BILIRUB SERPL-MCNC: 0.4 MG/DL (ref 0.2–1)
BILIRUBIN DIRECT+TOT PNL SERPL-MCNC: 0.1 MG/DL (ref 0–0.2)
BILIRUBIN DIRECT+TOT PNL SERPL-MCNC: 0.3 MG/DL
BUN SERPL-MCNC: 14 MG/DL (ref 7–18)
CALCIUM SERPL-MCNC: 9.2 MG/DL (ref 8.5–10.1)
CANCER AG125 SERPL-ACNC: 5.1 IU/ML (ref 1.5–35)
CHLORIDE SERPL-SCNC: 106 MMOL/L (ref 98–107)
CO2 SERPL-SCNC: 24 MMOL/L (ref 21–32)
CREAT SERPL-MCNC: 1 MG/DL (ref 0.6–1.3)
EOSINOPHIL # BLD AUTO: 0.1 X10(3)/MCL (ref 0–0.9)
EOSINOPHIL NFR BLD AUTO: 2 %
ERYTHROCYTE [DISTWIDTH] IN BLOOD BY AUTOMATED COUNT: 15.9 % (ref 11.5–14.5)
GLOBULIN SER-MCNC: 4.4 GM/ML (ref 2.3–3.5)
GLUCOSE SERPL-MCNC: 149 MG/DL (ref 74–106)
HCT VFR BLD AUTO: 37.6 % (ref 35–46)
HGB BLD-MCNC: 11.6 GM/DL (ref 12–16)
IMM GRANULOCYTES # BLD AUTO: 0.03 10*3/UL
IMM GRANULOCYTES NFR BLD AUTO: 1 %
LYMPHOCYTES # BLD AUTO: 2.1 X10(3)/MCL (ref 0.6–4.6)
LYMPHOCYTES NFR BLD AUTO: 40 %
MCH RBC QN AUTO: 26.2 PG (ref 26–34)
MCHC RBC AUTO-ENTMCNC: 30.9 GM/DL (ref 31–37)
MCV RBC AUTO: 84.9 FL (ref 80–100)
MONOCYTES # BLD AUTO: 0.5 X10(3)/MCL (ref 0.1–1.3)
MONOCYTES NFR BLD AUTO: 9 %
NEUTROPHILS # BLD AUTO: 2.48 X10(3)/MCL (ref 2.1–9.2)
NEUTROPHILS NFR BLD AUTO: 48 %
PLATELET # BLD AUTO: 272 X10(3)/MCL (ref 130–400)
PMV BLD AUTO: 9.5 FL (ref 7.4–10.4)
POTASSIUM SERPL-SCNC: 3.8 MMOL/L (ref 3.5–5.1)
PROT SERPL-MCNC: 7.9 GM/DL (ref 6.4–8.2)
RBC # BLD AUTO: 4.43 X10(6)/MCL (ref 4–5.2)
SODIUM SERPL-SCNC: 139 MMOL/L (ref 136–145)
WBC # SPEC AUTO: 5.2 X10(3)/MCL (ref 4.5–11)

## 2019-11-13 ENCOUNTER — HOSPITAL ENCOUNTER (OUTPATIENT)
Dept: EMERGENCY MEDICINE | Facility: HOSPITAL | Age: 59
End: 2019-11-14
Attending: INTERNAL MEDICINE | Admitting: INTERNAL MEDICINE

## 2019-11-13 ENCOUNTER — HISTORICAL (OUTPATIENT)
Dept: ENDOSCOPY | Facility: HOSPITAL | Age: 59
End: 2019-11-13

## 2019-11-13 LAB
ABS NEUT (OLG): 3.69 X10(3)/MCL (ref 2.1–9.2)
ALBUMIN SERPL-MCNC: 3.4 GM/DL (ref 3.4–5)
ALBUMIN/GLOB SERPL: 0.8 RATIO (ref 1.1–2)
ALP SERPL-CCNC: 120 UNIT/L (ref 45–117)
ALT SERPL-CCNC: 25 UNIT/L (ref 12–78)
AST SERPL-CCNC: 10 UNIT/L (ref 15–37)
BASOPHILS # BLD AUTO: 0 X10(3)/MCL (ref 0–0.2)
BASOPHILS NFR BLD AUTO: 0 %
BILIRUB SERPL-MCNC: 0.2 MG/DL (ref 0.2–1)
BILIRUBIN DIRECT+TOT PNL SERPL-MCNC: <0.1 MG/DL (ref 0–0.2)
BILIRUBIN DIRECT+TOT PNL SERPL-MCNC: ABNORMAL MG/DL
BUN SERPL-MCNC: 16 MG/DL (ref 7–18)
CALCIUM SERPL-MCNC: 9 MG/DL (ref 8.5–10.1)
CHLORIDE SERPL-SCNC: 111 MMOL/L (ref 98–107)
CO2 SERPL-SCNC: 23 MMOL/L (ref 21–32)
CREAT SERPL-MCNC: 1.2 MG/DL (ref 0.6–1.3)
EOSINOPHIL # BLD AUTO: 0.1 X10(3)/MCL (ref 0–0.9)
EOSINOPHIL NFR BLD AUTO: 2 %
ERYTHROCYTE [DISTWIDTH] IN BLOOD BY AUTOMATED COUNT: 16.4 % (ref 11.5–14.5)
GLOBULIN SER-MCNC: 4.3 GM/ML (ref 2.3–3.5)
GLUCOSE SERPL-MCNC: 170 MG/DL (ref 74–106)
HCT VFR BLD AUTO: 37.5 % (ref 35–46)
HGB BLD-MCNC: 11.5 GM/DL (ref 12–16)
IMM GRANULOCYTES # BLD AUTO: 0.04 10*3/UL
IMM GRANULOCYTES NFR BLD AUTO: 0 %
LYMPHOCYTES # BLD AUTO: 3 X10(3)/MCL (ref 0.6–4.6)
LYMPHOCYTES NFR BLD AUTO: 41 %
MCH RBC QN AUTO: 26.4 PG (ref 26–34)
MCHC RBC AUTO-ENTMCNC: 30.7 GM/DL (ref 31–37)
MCV RBC AUTO: 86 FL (ref 80–100)
MONOCYTES # BLD AUTO: 0.6 X10(3)/MCL (ref 0.1–1.3)
MONOCYTES NFR BLD AUTO: 8 %
NEUTROPHILS # BLD AUTO: 3.69 X10(3)/MCL (ref 2.1–9.2)
NEUTROPHILS NFR BLD AUTO: 49 %
PLATELET # BLD AUTO: 317 X10(3)/MCL (ref 130–400)
PMV BLD AUTO: 9.5 FL (ref 7.4–10.4)
POTASSIUM SERPL-SCNC: 3.8 MMOL/L (ref 3.5–5.1)
PROT SERPL-MCNC: 7.7 GM/DL (ref 6.4–8.2)
RBC # BLD AUTO: 4.36 X10(6)/MCL (ref 4–5.2)
SODIUM SERPL-SCNC: 140 MMOL/L (ref 136–145)
WBC # SPEC AUTO: 7.5 X10(3)/MCL (ref 4.5–11)

## 2019-11-14 LAB
ABS NEUT (OLG): 4.88 X10(3)/MCL (ref 2.1–9.2)
ALBUMIN SERPL-MCNC: 3.5 GM/DL (ref 3.4–5)
ALBUMIN/GLOB SERPL: 0.7 RATIO (ref 1.1–2)
ALP SERPL-CCNC: 111 UNIT/L (ref 45–117)
ALT SERPL-CCNC: 25 UNIT/L (ref 12–78)
AST SERPL-CCNC: 12 UNIT/L (ref 15–37)
BASOPHILS # BLD AUTO: 0 X10(3)/MCL (ref 0–0.2)
BASOPHILS NFR BLD AUTO: 0 %
BILIRUB SERPL-MCNC: 0.3 MG/DL (ref 0.2–1)
BILIRUBIN DIRECT+TOT PNL SERPL-MCNC: 0.1 MG/DL (ref 0–0.2)
BILIRUBIN DIRECT+TOT PNL SERPL-MCNC: 0.2 MG/DL
BUN SERPL-MCNC: 16 MG/DL (ref 7–18)
CALCIUM SERPL-MCNC: 9.2 MG/DL (ref 8.5–10.1)
CHLORIDE SERPL-SCNC: 110 MMOL/L (ref 98–107)
CO2 SERPL-SCNC: 24 MMOL/L (ref 21–32)
CREAT SERPL-MCNC: 1.1 MG/DL (ref 0.6–1.3)
ERYTHROCYTE [DISTWIDTH] IN BLOOD BY AUTOMATED COUNT: 16.1 % (ref 11.5–14.5)
GLOBULIN SER-MCNC: 4.7 GM/ML (ref 2.3–3.5)
GLUCOSE SERPL-MCNC: 296 MG/DL (ref 74–106)
GROUP & RH: NORMAL
HCT VFR BLD AUTO: 35.5 % (ref 35–46)
HGB BLD-MCNC: 10.8 GM/DL (ref 12–16)
IMM GRANULOCYTES # BLD AUTO: 0.03 10*3/UL
IMM GRANULOCYTES NFR BLD AUTO: 0 %
LYMPHOCYTES # BLD AUTO: 0.9 X10(3)/MCL (ref 0.6–4.6)
LYMPHOCYTES NFR BLD AUTO: 15 %
MCH RBC QN AUTO: 25.8 PG (ref 26–34)
MCHC RBC AUTO-ENTMCNC: 30.4 GM/DL (ref 31–37)
MCV RBC AUTO: 84.7 FL (ref 80–100)
MONOCYTES # BLD AUTO: 0.1 X10(3)/MCL (ref 0.1–1.3)
MONOCYTES NFR BLD AUTO: 1 %
NEUTROPHILS # BLD AUTO: 4.88 X10(3)/MCL (ref 2.1–9.2)
NEUTROPHILS NFR BLD AUTO: 83 %
PLATELET # BLD AUTO: 310 X10(3)/MCL (ref 130–400)
PMV BLD AUTO: 10.4 FL (ref 7.4–10.4)
POTASSIUM SERPL-SCNC: 3.9 MMOL/L (ref 3.5–5.1)
PROT SERPL-MCNC: 8.2 GM/DL (ref 6.4–8.2)
RBC # BLD AUTO: 4.19 X10(6)/MCL (ref 4–5.2)
SODIUM SERPL-SCNC: 139 MMOL/L (ref 136–145)
WBC # SPEC AUTO: 5.9 X10(3)/MCL (ref 4.5–11)

## 2019-12-30 ENCOUNTER — HISTORICAL (OUTPATIENT)
Dept: GASTROENTEROLOGY | Facility: CLINIC | Age: 59
End: 2019-12-30

## 2020-01-14 ENCOUNTER — HISTORICAL (OUTPATIENT)
Dept: INFUSION THERAPY | Facility: HOSPITAL | Age: 60
End: 2020-01-14

## 2020-02-12 ENCOUNTER — HISTORICAL (OUTPATIENT)
Dept: ADMINISTRATIVE | Facility: HOSPITAL | Age: 60
End: 2020-02-12

## 2020-02-12 ENCOUNTER — HISTORICAL (OUTPATIENT)
Dept: RADIOLOGY | Facility: HOSPITAL | Age: 60
End: 2020-02-12

## 2020-02-12 LAB
ABS NEUT (OLG): 3.52 X10(3)/MCL (ref 2.1–9.2)
ALBUMIN SERPL-MCNC: 3.8 GM/DL (ref 3.4–5)
ALBUMIN/GLOB SERPL: 0.9 RATIO (ref 1.1–2)
ALP SERPL-CCNC: 115 UNIT/L (ref 45–117)
ALT SERPL-CCNC: 21 UNIT/L (ref 12–78)
AST SERPL-CCNC: 8 UNIT/L (ref 15–37)
BASOPHILS # BLD AUTO: 0 X10(3)/MCL (ref 0–0.2)
BASOPHILS NFR BLD AUTO: 0 %
BILIRUB SERPL-MCNC: 0.4 MG/DL (ref 0.2–1)
BILIRUBIN DIRECT+TOT PNL SERPL-MCNC: 0.2 MG/DL
BILIRUBIN DIRECT+TOT PNL SERPL-MCNC: 0.2 MG/DL (ref 0–0.2)
BUN SERPL-MCNC: 13 MG/DL (ref 7–18)
CALCIUM SERPL-MCNC: 9.8 MG/DL (ref 8.5–10.1)
CANCER AG125 SERPL-ACNC: 3.4 IU/ML (ref 1.5–35)
CHLORIDE SERPL-SCNC: 109 MMOL/L (ref 98–107)
CO2 SERPL-SCNC: 28 MMOL/L (ref 21–32)
CREAT SERPL-MCNC: 0.9 MG/DL (ref 0.6–1.3)
EOSINOPHIL # BLD AUTO: 0.1 X10(3)/MCL (ref 0–0.9)
EOSINOPHIL NFR BLD AUTO: 2 %
ERYTHROCYTE [DISTWIDTH] IN BLOOD BY AUTOMATED COUNT: 16 % (ref 11.5–14.5)
GLOBULIN SER-MCNC: 4.4 GM/ML (ref 2.3–3.5)
GLUCOSE SERPL-MCNC: 102 MG/DL (ref 74–106)
HCT VFR BLD AUTO: 37 % (ref 35–46)
HGB BLD-MCNC: 11.5 GM/DL (ref 12–16)
IMM GRANULOCYTES # BLD AUTO: 0.02 10*3/UL
IMM GRANULOCYTES NFR BLD AUTO: 0 %
LYMPHOCYTES # BLD AUTO: 2.5 X10(3)/MCL (ref 0.6–4.6)
LYMPHOCYTES NFR BLD AUTO: 38 %
MCH RBC QN AUTO: 25.8 PG (ref 26–34)
MCHC RBC AUTO-ENTMCNC: 31.1 GM/DL (ref 31–37)
MCV RBC AUTO: 83 FL (ref 80–100)
MONOCYTES # BLD AUTO: 0.5 X10(3)/MCL (ref 0.1–1.3)
MONOCYTES NFR BLD AUTO: 7 %
NEUTROPHILS # BLD AUTO: 3.52 X10(3)/MCL (ref 2.1–9.2)
NEUTROPHILS NFR BLD AUTO: 52 %
PLATELET # BLD AUTO: 275 X10(3)/MCL (ref 130–400)
PMV BLD AUTO: 9.6 FL (ref 7.4–10.4)
POTASSIUM SERPL-SCNC: 3.6 MMOL/L (ref 3.5–5.1)
PROT SERPL-MCNC: 8.2 GM/DL (ref 6.4–8.2)
RBC # BLD AUTO: 4.46 X10(6)/MCL (ref 4–5.2)
SODIUM SERPL-SCNC: 141 MMOL/L (ref 136–145)
WBC # SPEC AUTO: 6.7 X10(3)/MCL (ref 4.5–11)

## 2020-02-28 ENCOUNTER — HISTORICAL (OUTPATIENT)
Dept: CARDIOLOGY | Facility: CLINIC | Age: 60
End: 2020-02-28

## 2020-02-28 LAB
CHOLEST SERPL-MCNC: 89 MG/DL
CHOLEST/HDLC SERPL: 1.9 {RATIO} (ref 0–4.4)
HDLC SERPL-MCNC: 47 MG/DL (ref 40–59)
LDLC SERPL CALC-MCNC: 29 MG/DL
TRIGL SERPL-MCNC: 65 MG/DL
VLDLC SERPL CALC-MCNC: 13 MG/DL

## 2020-03-10 ENCOUNTER — HISTORICAL (OUTPATIENT)
Dept: INFUSION THERAPY | Facility: HOSPITAL | Age: 60
End: 2020-03-10

## 2020-05-11 ENCOUNTER — HISTORICAL (OUTPATIENT)
Dept: INFUSION THERAPY | Facility: HOSPITAL | Age: 60
End: 2020-05-11

## 2020-05-11 LAB
ABS NEUT (OLG): 3.97 X10(3)/MCL (ref 2.1–9.2)
ALBUMIN SERPL-MCNC: 3.8 GM/DL (ref 3.4–5)
ALBUMIN/GLOB SERPL: 0.8 RATIO (ref 1.1–2)
ALP SERPL-CCNC: 126 UNIT/L (ref 45–117)
ALT SERPL-CCNC: 24 UNIT/L (ref 12–78)
AST SERPL-CCNC: 15 UNIT/L (ref 15–37)
BASOPHILS # BLD AUTO: 0 X10(3)/MCL (ref 0–0.2)
BASOPHILS NFR BLD AUTO: 0 %
BILIRUB SERPL-MCNC: 0.5 MG/DL (ref 0.2–1)
BILIRUBIN DIRECT+TOT PNL SERPL-MCNC: 0.1 MG/DL (ref 0–0.2)
BILIRUBIN DIRECT+TOT PNL SERPL-MCNC: 0.4 MG/DL
BUN SERPL-MCNC: 12 MG/DL (ref 7–18)
CALCIUM SERPL-MCNC: 9.2 MG/DL (ref 8.5–10.1)
CANCER AG125 SERPL-ACNC: 7.6 UNIT/ML (ref 0–35)
CHLORIDE SERPL-SCNC: 108 MMOL/L (ref 98–107)
CO2 SERPL-SCNC: 27 MMOL/L (ref 21–32)
CREAT SERPL-MCNC: 1 MG/DL (ref 0.6–1.3)
EOSINOPHIL # BLD AUTO: 0.1 X10(3)/MCL (ref 0–0.9)
EOSINOPHIL NFR BLD AUTO: 2 %
ERYTHROCYTE [DISTWIDTH] IN BLOOD BY AUTOMATED COUNT: 16.5 % (ref 11.5–14.5)
GLOBULIN SER-MCNC: 4.6 GM/ML (ref 2.3–3.5)
GLUCOSE SERPL-MCNC: 121 MG/DL (ref 74–106)
HCT VFR BLD AUTO: 37.3 % (ref 35–46)
HGB BLD-MCNC: 11.6 GM/DL (ref 12–16)
IMM GRANULOCYTES # BLD AUTO: 0.03 10*3/UL
IMM GRANULOCYTES NFR BLD AUTO: 0 %
LYMPHOCYTES # BLD AUTO: 2.8 X10(3)/MCL (ref 0.6–4.6)
LYMPHOCYTES NFR BLD AUTO: 38 %
MCH RBC QN AUTO: 25.2 PG (ref 26–34)
MCHC RBC AUTO-ENTMCNC: 31.1 GM/DL (ref 31–37)
MCV RBC AUTO: 80.9 FL (ref 80–100)
MONOCYTES # BLD AUTO: 0.5 X10(3)/MCL (ref 0.1–1.3)
MONOCYTES NFR BLD AUTO: 7 %
NEUTROPHILS # BLD AUTO: 3.97 X10(3)/MCL (ref 2.1–9.2)
NEUTROPHILS NFR BLD AUTO: 53 %
PLATELET # BLD AUTO: 296 X10(3)/MCL (ref 130–400)
PMV BLD AUTO: 10.4 FL (ref 7.4–10.4)
POTASSIUM SERPL-SCNC: 3.1 MMOL/L (ref 3.5–5.1)
PROT SERPL-MCNC: 8.4 GM/DL (ref 6.4–8.2)
RBC # BLD AUTO: 4.61 X10(6)/MCL (ref 4–5.2)
SODIUM SERPL-SCNC: 140 MMOL/L (ref 136–145)
WBC # SPEC AUTO: 7.5 X10(3)/MCL (ref 4.5–11)

## 2020-05-18 ENCOUNTER — HISTORICAL (OUTPATIENT)
Dept: HEMATOLOGY/ONCOLOGY | Facility: CLINIC | Age: 60
End: 2020-05-18

## 2020-07-09 ENCOUNTER — HISTORICAL (OUTPATIENT)
Dept: INFUSION THERAPY | Facility: HOSPITAL | Age: 60
End: 2020-07-09

## 2020-08-10 ENCOUNTER — HISTORICAL (OUTPATIENT)
Dept: HEMATOLOGY/ONCOLOGY | Facility: CLINIC | Age: 60
End: 2020-08-10

## 2020-08-10 LAB
ABS NEUT (OLG): 3.05 X10(3)/MCL (ref 2.1–9.2)
ALBUMIN SERPL-MCNC: 3.7 GM/DL (ref 3.4–5)
ALBUMIN/GLOB SERPL: 0.8 RATIO (ref 1.1–2)
ALP SERPL-CCNC: 114 UNIT/L (ref 45–117)
ALT SERPL-CCNC: 26 UNIT/L (ref 12–78)
AST SERPL-CCNC: 11 UNIT/L (ref 15–37)
BASOPHILS # BLD AUTO: 0 X10(3)/MCL (ref 0–0.2)
BASOPHILS NFR BLD AUTO: 0 %
BILIRUB SERPL-MCNC: 0.4 MG/DL (ref 0.2–1)
BILIRUBIN DIRECT+TOT PNL SERPL-MCNC: 0.1 MG/DL (ref 0–0.2)
BILIRUBIN DIRECT+TOT PNL SERPL-MCNC: 0.3 MG/DL
BUN SERPL-MCNC: 12 MG/DL (ref 7–18)
CALCIUM SERPL-MCNC: 9.2 MG/DL (ref 8.5–10.1)
CANCER AG125 SERPL-ACNC: 6.5 UNIT/ML (ref 0–35)
CHLORIDE SERPL-SCNC: 108 MMOL/L (ref 98–107)
CO2 SERPL-SCNC: 23 MMOL/L (ref 21–32)
CREAT SERPL-MCNC: 1 MG/DL (ref 0.6–1.3)
EOSINOPHIL # BLD AUTO: 0.1 X10(3)/MCL (ref 0–0.9)
EOSINOPHIL NFR BLD AUTO: 2 %
ERYTHROCYTE [DISTWIDTH] IN BLOOD BY AUTOMATED COUNT: 16.8 % (ref 11.5–14.5)
GLOBULIN SER-MCNC: 4.5 GM/ML (ref 2.3–3.5)
GLUCOSE SERPL-MCNC: 139 MG/DL (ref 74–106)
HCT VFR BLD AUTO: 38.2 % (ref 35–46)
HGB BLD-MCNC: 11.8 GM/DL (ref 12–16)
IMM GRANULOCYTES # BLD AUTO: 0.03 10*3/UL
IMM GRANULOCYTES NFR BLD AUTO: 0 %
LYMPHOCYTES # BLD AUTO: 2.5 X10(3)/MCL (ref 0.6–4.6)
LYMPHOCYTES NFR BLD AUTO: 40 %
MCH RBC QN AUTO: 25.1 PG (ref 26–34)
MCHC RBC AUTO-ENTMCNC: 30.9 GM/DL (ref 31–37)
MCV RBC AUTO: 81.3 FL (ref 80–100)
MONOCYTES # BLD AUTO: 0.5 X10(3)/MCL (ref 0.1–1.3)
MONOCYTES NFR BLD AUTO: 8 %
NEUTROPHILS # BLD AUTO: 3.05 X10(3)/MCL (ref 2.1–9.2)
NEUTROPHILS NFR BLD AUTO: 49 %
PLATELET # BLD AUTO: 278 X10(3)/MCL (ref 130–400)
PMV BLD AUTO: 10 FL (ref 7.4–10.4)
POTASSIUM SERPL-SCNC: 3.8 MMOL/L (ref 3.5–5.1)
PROT SERPL-MCNC: 8.2 GM/DL (ref 6.4–8.2)
RBC # BLD AUTO: 4.7 X10(6)/MCL (ref 4–5.2)
SODIUM SERPL-SCNC: 138 MMOL/L (ref 136–145)
WBC # SPEC AUTO: 6.3 X10(3)/MCL (ref 4.5–11)

## 2020-09-03 ENCOUNTER — HISTORICAL (OUTPATIENT)
Dept: INFUSION THERAPY | Facility: HOSPITAL | Age: 60
End: 2020-09-03

## 2020-10-29 ENCOUNTER — HISTORICAL (OUTPATIENT)
Dept: INFUSION THERAPY | Facility: HOSPITAL | Age: 60
End: 2020-10-29

## 2020-11-10 ENCOUNTER — HISTORICAL (OUTPATIENT)
Dept: HEMATOLOGY/ONCOLOGY | Facility: CLINIC | Age: 60
End: 2020-11-10

## 2020-11-10 LAB
ABS NEUT (OLG): 3.42 X10(3)/MCL (ref 2.1–9.2)
ALBUMIN SERPL-MCNC: 3.9 GM/DL (ref 3.5–5)
ALBUMIN/GLOB SERPL: 1 RATIO (ref 1.1–2)
ALP SERPL-CCNC: 112 UNIT/L (ref 40–150)
ALT SERPL-CCNC: 17 UNIT/L (ref 0–55)
AST SERPL-CCNC: 13 UNIT/L (ref 5–34)
BASOPHILS NFR BLD MANUAL: 0 %
BILIRUB SERPL-MCNC: 0.3 MG/DL
BILIRUBIN DIRECT+TOT PNL SERPL-MCNC: 0.1 MG/DL (ref 0–0.5)
BILIRUBIN DIRECT+TOT PNL SERPL-MCNC: 0.2 MG/DL (ref 0–0.8)
BUN SERPL-MCNC: 14 MG/DL (ref 9.8–20.1)
CALCIUM SERPL-MCNC: 9.6 MG/DL (ref 8.4–10.2)
CANCER AG125 SERPL-ACNC: 6.3 UNIT/ML (ref 0–35)
CHLORIDE SERPL-SCNC: 106 MMOL/L (ref 98–107)
CO2 SERPL-SCNC: 26 MMOL/L (ref 22–29)
CREAT SERPL-MCNC: 0.93 MG/DL (ref 0.55–1.02)
EOSINOPHIL NFR BLD MANUAL: 2 %
ERYTHROCYTE [DISTWIDTH] IN BLOOD BY AUTOMATED COUNT: 16.3 % (ref 11.5–14.5)
GLOBULIN SER-MCNC: 4 GM/DL (ref 2.4–3.5)
GLUCOSE SERPL-MCNC: 157 MG/DL (ref 74–100)
GRANULOCYTES NFR BLD MANUAL: 42 % (ref 43–75)
HCT VFR BLD AUTO: 38.5 % (ref 35–46)
HGB BLD-MCNC: 12.1 GM/DL (ref 12–16)
HYPOCHROMIA BLD QL SMEAR: ABNORMAL
LYMPHOCYTES NFR BLD MANUAL: 2 %
LYMPHOCYTES NFR BLD MANUAL: 43 % (ref 20.5–51.1)
MCH RBC QN AUTO: 25.4 PG (ref 26–34)
MCHC RBC AUTO-ENTMCNC: 31.4 GM/DL (ref 31–37)
MCV RBC AUTO: 80.9 FL (ref 80–100)
MONOCYTES NFR BLD MANUAL: 11 % (ref 2–9)
PLATELET # BLD AUTO: 285 X10(3)/MCL (ref 130–400)
PLATELET # BLD EST: ADEQUATE 10*3/UL
PMV BLD AUTO: 10.9 FL (ref 7.4–10.4)
POLYCHROMASIA BLD QL SMEAR: ABNORMAL
POTASSIUM SERPL-SCNC: 3.7 MMOL/L (ref 3.5–5.1)
PROT SERPL-MCNC: 7.9 GM/DL (ref 6.4–8.3)
RBC # BLD AUTO: 4.76 X10(6)/MCL (ref 4–5.2)
RBC MORPH BLD: ABNORMAL
SODIUM SERPL-SCNC: 141 MMOL/L (ref 136–145)
WBC # SPEC AUTO: 6.8 X10(3)/MCL (ref 4.5–11)

## 2020-12-18 ENCOUNTER — HISTORICAL (OUTPATIENT)
Dept: RADIOLOGY | Facility: HOSPITAL | Age: 60
End: 2020-12-18

## 2020-12-30 ENCOUNTER — HISTORICAL (OUTPATIENT)
Dept: ADMINISTRATIVE | Facility: HOSPITAL | Age: 60
End: 2020-12-30

## 2020-12-30 LAB — CANCER AG125 SERPL-ACNC: 6.7 UNIT/ML (ref 0–35)

## 2021-01-14 ENCOUNTER — HISTORICAL (OUTPATIENT)
Dept: INFUSION THERAPY | Facility: HOSPITAL | Age: 61
End: 2021-01-14

## 2021-02-24 ENCOUNTER — HISTORICAL (OUTPATIENT)
Dept: CARDIOLOGY | Facility: HOSPITAL | Age: 61
End: 2021-02-24

## 2021-03-11 ENCOUNTER — HISTORICAL (OUTPATIENT)
Dept: INFUSION THERAPY | Facility: HOSPITAL | Age: 61
End: 2021-03-11

## 2021-04-08 ENCOUNTER — HISTORICAL (OUTPATIENT)
Dept: INTERNAL MEDICINE | Facility: CLINIC | Age: 61
End: 2021-04-08

## 2021-04-08 LAB
ABS NEUT (OLG): 2.95 X10(3)/MCL (ref 2.1–9.2)
ALBUMIN SERPL-MCNC: 3.7 GM/DL (ref 3.5–5)
ALBUMIN/GLOB SERPL: 1 RATIO (ref 1.1–2)
ALP SERPL-CCNC: 105 UNIT/L (ref 40–150)
ALT SERPL-CCNC: 15 UNIT/L (ref 0–55)
APPEARANCE, UA: CLEAR
AST SERPL-CCNC: 16 UNIT/L (ref 5–34)
BACTERIA #/AREA URNS AUTO: ABNORMAL /HPF
BASOPHILS # BLD AUTO: 0 X10(3)/MCL (ref 0–0.2)
BASOPHILS NFR BLD AUTO: 0 %
BILIRUB SERPL-MCNC: 0.5 MG/DL
BILIRUB UR QL STRIP: NEGATIVE
BILIRUBIN DIRECT+TOT PNL SERPL-MCNC: 0.2 MG/DL (ref 0–0.8)
BILIRUBIN DIRECT+TOT PNL SERPL-MCNC: 0.3 MG/DL (ref 0–0.5)
BUN SERPL-MCNC: 13.4 MG/DL (ref 9.8–20.1)
CALCIUM SERPL-MCNC: 9 MG/DL (ref 8.4–10.2)
CHLORIDE SERPL-SCNC: 106 MMOL/L (ref 98–107)
CHOLEST SERPL-MCNC: 99 MG/DL
CHOLEST/HDLC SERPL: 3 {RATIO} (ref 0–5)
CO2 SERPL-SCNC: 25 MMOL/L (ref 22–29)
COLOR UR: NORMAL
CREAT SERPL-MCNC: 0.88 MG/DL (ref 0.55–1.02)
CREAT UR-MCNC: 101.6 MG/DL (ref 45–106)
EOSINOPHIL # BLD AUTO: 0.1 X10(3)/MCL (ref 0–0.9)
EOSINOPHIL NFR BLD AUTO: 2 %
ERYTHROCYTE [DISTWIDTH] IN BLOOD BY AUTOMATED COUNT: 15.9 % (ref 11.5–14.5)
EST. AVERAGE GLUCOSE BLD GHB EST-MCNC: 148.5 MG/DL
GLOBULIN SER-MCNC: 3.8 GM/DL (ref 2.4–3.5)
GLUCOSE (UA): NEGATIVE
GLUCOSE SERPL-MCNC: 202 MG/DL (ref 74–100)
HAV IGM SERPL QL IA: NONREACTIVE
HBA1C MFR BLD: 6.8 %
HBV CORE IGM SERPL QL IA: NONREACTIVE
HBV SURFACE AG SERPL QL IA: NONREACTIVE
HCT VFR BLD AUTO: 37.1 % (ref 35–46)
HCV AB SERPL QL IA: NONREACTIVE
HDLC SERPL-MCNC: 36 MG/DL (ref 35–60)
HGB BLD-MCNC: 11.6 GM/DL (ref 12–16)
HGB UR QL STRIP: NEGATIVE
HIV 1+2 AB+HIV1 P24 AG SERPL QL IA: NONREACTIVE
HYALINE CASTS #/AREA URNS LPF: ABNORMAL /LPF
IMM GRANULOCYTES # BLD AUTO: 0.03 10*3/UL
IMM GRANULOCYTES NFR BLD AUTO: 0 %
KETONES UR QL STRIP: NEGATIVE
LDLC SERPL CALC-MCNC: 48 MG/DL (ref 50–140)
LEUKOCYTE ESTERASE UR QL STRIP: NEGATIVE
LYMPHOCYTES # BLD AUTO: 2.6 X10(3)/MCL (ref 0.6–4.6)
LYMPHOCYTES NFR BLD AUTO: 41 %
MCH RBC QN AUTO: 25.6 PG (ref 26–34)
MCHC RBC AUTO-ENTMCNC: 31.3 GM/DL (ref 31–37)
MCV RBC AUTO: 81.7 FL (ref 80–100)
MICROALBUMIN UR-MCNC: 10.4 MG/L
MICROALBUMIN/CREAT RATIO PNL UR: 10.2 MG/GM CR (ref 0–30)
MONOCYTES # BLD AUTO: 0.5 X10(3)/MCL (ref 0.1–1.3)
MONOCYTES NFR BLD AUTO: 8 %
NEUTROPHILS # BLD AUTO: 2.95 X10(3)/MCL (ref 2.1–9.2)
NEUTROPHILS NFR BLD AUTO: 48 %
NITRITE UR QL STRIP: NEGATIVE
PH UR STRIP: 6 [PH] (ref 4.5–8)
PLATELET # BLD AUTO: 256 X10(3)/MCL (ref 130–400)
PMV BLD AUTO: 10.6 FL (ref 7.4–10.4)
POTASSIUM SERPL-SCNC: 3.8 MMOL/L (ref 3.5–5.1)
PROT SERPL-MCNC: 7.5 GM/DL (ref 6.4–8.3)
PROT UR QL STRIP: NEGATIVE
RBC # BLD AUTO: 4.54 X10(6)/MCL (ref 4–5.2)
RBC #/AREA URNS AUTO: ABNORMAL /HPF
SODIUM SERPL-SCNC: 140 MMOL/L (ref 136–145)
SP GR UR STRIP: 1.01 (ref 1–1.03)
SQUAMOUS #/AREA URNS LPF: ABNORMAL /LPF
T PALLIDUM AB SER QL: NONREACTIVE
TRIGL SERPL-MCNC: 75 MG/DL (ref 37–140)
TSH SERPL-ACNC: 1.22 UIU/ML (ref 0.35–4.94)
UROBILINOGEN UR STRIP-ACNC: NORMAL
VLDLC SERPL CALC-MCNC: 15 MG/DL
WBC # SPEC AUTO: 6.2 X10(3)/MCL (ref 4.5–11)
WBC #/AREA URNS AUTO: ABNORMAL /HPF

## 2021-04-29 ENCOUNTER — HISTORICAL (OUTPATIENT)
Dept: RADIOLOGY | Facility: HOSPITAL | Age: 61
End: 2021-04-29

## 2021-05-06 ENCOUNTER — HISTORICAL (OUTPATIENT)
Dept: INFUSION THERAPY | Facility: HOSPITAL | Age: 61
End: 2021-05-06

## 2021-05-18 ENCOUNTER — HISTORICAL (OUTPATIENT)
Dept: ADMINISTRATIVE | Facility: HOSPITAL | Age: 61
End: 2021-05-18

## 2021-06-03 ENCOUNTER — HISTORICAL (OUTPATIENT)
Dept: ADMINISTRATIVE | Facility: HOSPITAL | Age: 61
End: 2021-06-03

## 2021-06-03 LAB
ABS NEUT (OLG): 3.79 X10(3)/MCL (ref 2.1–9.2)
ALBUMIN SERPL-MCNC: 3.7 GM/DL (ref 3.5–5)
ALBUMIN/GLOB SERPL: 1 RATIO (ref 1.1–2)
ALP SERPL-CCNC: 108 UNIT/L (ref 40–150)
ALT SERPL-CCNC: 14 UNIT/L (ref 0–55)
ANISOCYTOSIS BLD QL SMEAR: ABNORMAL
AST SERPL-CCNC: 11 UNIT/L (ref 5–34)
BASOPHILS NFR BLD MANUAL: 0 %
BILIRUB SERPL-MCNC: 0.5 MG/DL
BILIRUBIN DIRECT+TOT PNL SERPL-MCNC: 0.2 MG/DL (ref 0–0.5)
BILIRUBIN DIRECT+TOT PNL SERPL-MCNC: 0.3 MG/DL (ref 0–0.8)
BUN SERPL-MCNC: 15.4 MG/DL (ref 9.8–20.1)
CALCIUM SERPL-MCNC: 9.7 MG/DL (ref 8.4–10.2)
CANCER AG125 SERPL-ACNC: 5 UNIT/ML (ref 0–35)
CHLORIDE SERPL-SCNC: 106 MMOL/L (ref 98–107)
CO2 SERPL-SCNC: 23 MMOL/L (ref 22–29)
CREAT SERPL-MCNC: 0.91 MG/DL (ref 0.55–1.02)
EOSINOPHIL NFR BLD MANUAL: 3 %
ERYTHROCYTE [DISTWIDTH] IN BLOOD BY AUTOMATED COUNT: 15.6 % (ref 11.5–14.5)
GLOBULIN SER-MCNC: 3.8 GM/DL (ref 2.4–3.5)
GLUCOSE SERPL-MCNC: 187 MG/DL (ref 74–100)
GRANULOCYTES NFR BLD MANUAL: 66 % (ref 43–75)
HCT VFR BLD AUTO: 36.6 % (ref 35–46)
HGB BLD-MCNC: 11.5 GM/DL (ref 12–16)
LYMPHOCYTES NFR BLD MANUAL: 30 % (ref 20.5–51.1)
MCH RBC QN AUTO: 25.6 PG (ref 26–34)
MCHC RBC AUTO-ENTMCNC: 31.4 GM/DL (ref 31–37)
MCV RBC AUTO: 81.3 FL (ref 80–100)
MICROCYTES BLD QL SMEAR: ABNORMAL
MONOCYTES NFR BLD MANUAL: 1 % (ref 2–9)
PLATELET # BLD AUTO: 253 X10(3)/MCL (ref 130–400)
PLATELET # BLD EST: ADEQUATE 10*3/UL
PMV BLD AUTO: 10.5 FL (ref 7.4–10.4)
POIKILOCYTOSIS BLD QL SMEAR: ABNORMAL
POTASSIUM SERPL-SCNC: 3.6 MMOL/L (ref 3.5–5.1)
PROT SERPL-MCNC: 7.5 GM/DL (ref 6.4–8.3)
RBC # BLD AUTO: 4.5 X10(6)/MCL (ref 4–5.2)
RBC MORPH BLD: ABNORMAL
SODIUM SERPL-SCNC: 141 MMOL/L (ref 136–145)
WBC # SPEC AUTO: 6.7 X10(3)/MCL (ref 4.5–11)

## 2021-06-07 ENCOUNTER — HISTORICAL (OUTPATIENT)
Dept: RADIOLOGY | Facility: HOSPITAL | Age: 61
End: 2021-06-07

## 2021-07-01 ENCOUNTER — HISTORICAL (OUTPATIENT)
Dept: INFUSION THERAPY | Facility: HOSPITAL | Age: 61
End: 2021-07-01

## 2021-07-07 ENCOUNTER — HISTORICAL (OUTPATIENT)
Dept: ADMINISTRATIVE | Facility: HOSPITAL | Age: 61
End: 2021-07-07

## 2021-07-07 LAB
APPEARANCE, UA: CLEAR
BACTERIA #/AREA URNS AUTO: ABNORMAL /HPF
BILIRUB UR QL STRIP: NEGATIVE
COLOR UR: YELLOW
GLUCOSE (UA): NEGATIVE
HGB UR QL STRIP: NEGATIVE
HYALINE CASTS #/AREA URNS LPF: ABNORMAL /LPF
KETONES UR QL STRIP: NEGATIVE
LEUKOCYTE ESTERASE UR QL STRIP: NEGATIVE
NITRITE UR QL STRIP: NEGATIVE
PH UR STRIP: 6 [PH] (ref 4.5–8)
PROT UR QL STRIP: 10 MG/DL
RBC #/AREA URNS AUTO: ABNORMAL /HPF
SP GR UR STRIP: 1.02 (ref 1–1.03)
SQUAMOUS #/AREA URNS LPF: ABNORMAL /LPF
UROBILINOGEN UR STRIP-ACNC: 2 MG/DL
WBC #/AREA URNS AUTO: ABNORMAL /HPF

## 2021-07-09 LAB — FINAL CULTURE: NO GROWTH

## 2021-08-26 ENCOUNTER — HISTORICAL (OUTPATIENT)
Dept: INFUSION THERAPY | Facility: HOSPITAL | Age: 61
End: 2021-08-26

## 2021-10-13 ENCOUNTER — HISTORICAL (OUTPATIENT)
Dept: INTERNAL MEDICINE | Facility: CLINIC | Age: 61
End: 2021-10-13

## 2021-10-13 LAB
ABS NEUT (OLG): 2.87 X10(3)/MCL (ref 2.1–9.2)
ALBUMIN SERPL-MCNC: 3.8 GM/DL (ref 3.5–5)
ALBUMIN/GLOB SERPL: 1 RATIO (ref 1.1–2)
ALP SERPL-CCNC: 97 UNIT/L (ref 40–150)
ALT SERPL-CCNC: 17 UNIT/L (ref 0–55)
APPEARANCE, UA: CLEAR
AST SERPL-CCNC: 13 UNIT/L (ref 5–34)
BACTERIA #/AREA URNS AUTO: ABNORMAL /HPF
BASOPHILS # BLD AUTO: 0 X10(3)/MCL (ref 0–0.2)
BASOPHILS NFR BLD AUTO: 0 %
BILIRUB SERPL-MCNC: 0.4 MG/DL
BILIRUB UR QL STRIP: NEGATIVE
BILIRUBIN DIRECT+TOT PNL SERPL-MCNC: 0.2 MG/DL (ref 0–0.5)
BILIRUBIN DIRECT+TOT PNL SERPL-MCNC: 0.2 MG/DL (ref 0–0.8)
BUN SERPL-MCNC: 13.1 MG/DL (ref 9.8–20.1)
CALCIUM SERPL-MCNC: 9.9 MG/DL (ref 8.4–10.2)
CHLORIDE SERPL-SCNC: 109 MMOL/L (ref 98–107)
CHOLEST SERPL-MCNC: 106 MG/DL
CHOLEST/HDLC SERPL: 3 {RATIO} (ref 0–5)
CO2 SERPL-SCNC: 25 MMOL/L (ref 22–29)
COLOR UR: YELLOW
CREAT SERPL-MCNC: 0.85 MG/DL (ref 0.55–1.02)
EOSINOPHIL # BLD AUTO: 0.1 X10(3)/MCL (ref 0–0.9)
EOSINOPHIL NFR BLD AUTO: 2 %
ERYTHROCYTE [DISTWIDTH] IN BLOOD BY AUTOMATED COUNT: 16.1 % (ref 11.5–14.5)
EST. AVERAGE GLUCOSE BLD GHB EST-MCNC: 116.9 MG/DL
GLOBULIN SER-MCNC: 3.8 GM/DL (ref 2.4–3.5)
GLUCOSE (UA): NEGATIVE
GLUCOSE SERPL-MCNC: 134 MG/DL (ref 74–100)
HBA1C MFR BLD: 5.7 %
HCT VFR BLD AUTO: 38 % (ref 35–46)
HDLC SERPL-MCNC: 38 MG/DL (ref 35–60)
HGB BLD-MCNC: 11.9 GM/DL (ref 12–16)
HGB UR QL STRIP: NEGATIVE
HYALINE CASTS #/AREA URNS LPF: ABNORMAL /LPF
IMM GRANULOCYTES # BLD AUTO: 0.02 10*3/UL
IMM GRANULOCYTES NFR BLD AUTO: 0 %
KETONES UR QL STRIP: NEGATIVE
LDLC SERPL CALC-MCNC: 53 MG/DL (ref 50–140)
LEUKOCYTE ESTERASE UR QL STRIP: NEGATIVE
LYMPHOCYTES # BLD AUTO: 2.7 X10(3)/MCL (ref 0.6–4.6)
LYMPHOCYTES NFR BLD AUTO: 43 %
MCH RBC QN AUTO: 25.5 PG (ref 26–34)
MCHC RBC AUTO-ENTMCNC: 31.3 GM/DL (ref 31–37)
MCV RBC AUTO: 81.5 FL (ref 80–100)
MONOCYTES # BLD AUTO: 0.4 X10(3)/MCL (ref 0.1–1.3)
MONOCYTES NFR BLD AUTO: 7 %
NEUTROPHILS # BLD AUTO: 2.87 X10(3)/MCL (ref 2.1–9.2)
NEUTROPHILS NFR BLD AUTO: 47 %
NITRITE UR QL STRIP: NEGATIVE
NRBC BLD AUTO-RTO: 0 % (ref 0–0.2)
PH UR STRIP: 6 [PH] (ref 4.5–8)
PLATELET # BLD AUTO: 258 X10(3)/MCL (ref 130–400)
PMV BLD AUTO: 10.5 FL (ref 7.4–10.4)
POTASSIUM SERPL-SCNC: 3.8 MMOL/L (ref 3.5–5.1)
PROT SERPL-MCNC: 7.6 GM/DL (ref 6.4–8.3)
PROT UR QL STRIP: NEGATIVE
RBC # BLD AUTO: 4.66 X10(6)/MCL (ref 4–5.2)
RBC #/AREA URNS AUTO: ABNORMAL /HPF
SODIUM SERPL-SCNC: 141 MMOL/L (ref 136–145)
SP GR UR STRIP: 1.02 (ref 1–1.03)
SQUAMOUS #/AREA URNS LPF: ABNORMAL /LPF
TRIGL SERPL-MCNC: 76 MG/DL (ref 37–140)
UROBILINOGEN UR STRIP-ACNC: NORMAL
VLDLC SERPL CALC-MCNC: 15 MG/DL
WBC # SPEC AUTO: 6.2 X10(3)/MCL (ref 4.5–11)
WBC #/AREA URNS AUTO: ABNORMAL /HPF

## 2021-10-15 ENCOUNTER — HISTORICAL (OUTPATIENT)
Dept: ADMINISTRATIVE | Facility: HOSPITAL | Age: 61
End: 2021-10-15

## 2022-01-05 ENCOUNTER — HISTORICAL (OUTPATIENT)
Dept: RADIOLOGY | Facility: HOSPITAL | Age: 62
End: 2022-01-05

## 2022-03-08 ENCOUNTER — HISTORICAL (OUTPATIENT)
Dept: ADMINISTRATIVE | Facility: HOSPITAL | Age: 62
End: 2022-03-08

## 2022-04-10 ENCOUNTER — HISTORICAL (OUTPATIENT)
Dept: ADMINISTRATIVE | Facility: HOSPITAL | Age: 62
End: 2022-04-10
Payer: MEDICARE

## 2022-04-13 ENCOUNTER — HISTORICAL (OUTPATIENT)
Dept: INTERNAL MEDICINE | Facility: CLINIC | Age: 62
End: 2022-04-13
Payer: MEDICARE

## 2022-04-13 LAB
ABS NEUT (OLG): 2.81 (ref 2.1–9.2)
ALBUMIN SERPL-MCNC: 3.8 G/DL (ref 3.5–5)
ALBUMIN/GLOB SERPL: 1 {RATIO} (ref 1.1–2)
ALP SERPL-CCNC: 114 U/L (ref 40–150)
ALT SERPL-CCNC: 19 U/L (ref 0–55)
APPEARANCE, UA: CLEAR
AST SERPL-CCNC: 14 U/L (ref 5–34)
BACTERIA SPEC CULT: NORMAL
BASOPHILS # BLD AUTO: 0 10*3/UL (ref 0–0.2)
BASOPHILS NFR BLD AUTO: 0 %
BILIRUB SERPL-MCNC: 0.4 MG/DL
BILIRUB UR QL STRIP: NEGATIVE
BILIRUBIN DIRECT+TOT PNL SERPL-MCNC: 0.2 (ref 0–0.5)
BILIRUBIN DIRECT+TOT PNL SERPL-MCNC: 0.2 (ref 0–0.8)
BUN SERPL-MCNC: 9.6 MG/DL (ref 9.8–20.1)
CALCIUM SERPL-MCNC: 9.6 MG/DL (ref 8.7–10.5)
CHLORIDE SERPL-SCNC: 107 MMOL/L (ref 98–107)
CHOLEST SERPL-MCNC: 115 MG/DL
CHOLEST/HDLC SERPL: 3 {RATIO} (ref 0–5)
CO2 SERPL-SCNC: 24 MMOL/L (ref 22–29)
COLOR UR: YELLOW
CREAT SERPL-MCNC: 0.75 MG/DL (ref 0.55–1.02)
CREAT UR-MCNC: 145.4 MG/DL (ref 45–106)
EOSINOPHIL # BLD AUTO: 0.1 10*3/UL (ref 0–0.9)
EOSINOPHIL NFR BLD AUTO: 2 %
ERYTHROCYTE [DISTWIDTH] IN BLOOD BY AUTOMATED COUNT: 15.4 % (ref 11.5–14.5)
EST. AVERAGE GLUCOSE BLD GHB EST-MCNC: 137 MG/DL
FLAG2 (OHS): 60
FLAG3 (OHS): 80
FLAGS (OHS): 90
GLOBULIN SER-MCNC: 3.9 G/DL (ref 2.4–3.5)
GLUCOSE (UA): NORMAL
GLUCOSE SERPL-MCNC: 132 MG/DL (ref 74–100)
HBA1C MFR BLD: 6.4 %
HCT VFR BLD AUTO: 38.9 % (ref 35–46)
HDLC SERPL-MCNC: 45 MG/DL (ref 35–60)
HEMOLYSIS INTERF INDEX SERPL-ACNC: 3
HGB BLD-MCNC: 12.2 G/DL (ref 12–16)
HGB UR QL STRIP: NORMAL
HYALINE CASTS #/AREA URNS LPF: NORMAL /[LPF]
ICTERIC INTERF INDEX SERPL-ACNC: 0
IMM GRANULOCYTES # BLD AUTO: 0.03 10*3/UL
IMM GRANULOCYTES NFR BLD AUTO: 0 %
IMM. NE 2 SUSPECT FLAG (OHS): 110
KETONES UR QL STRIP: NEGATIVE
LDLC SERPL CALC-MCNC: 56 MG/DL (ref 50–140)
LEUKOCYTE ESTERASE UR QL STRIP: NEGATIVE
LIPEMIC INTERF INDEX SERPL-ACNC: 2
LOW EVENT # SUSPECT FLAG (OHS): 90
LYMPHOCYTES # BLD AUTO: 2.9 10*3/UL (ref 0.6–4.6)
LYMPHOCYTES NFR BLD AUTO: 46 %
MANUAL DIFF? (OHS): NO
MCH RBC QN AUTO: 25.1 PG (ref 26–34)
MCHC RBC AUTO-ENTMCNC: 31.4 G/DL (ref 31–37)
MCV RBC AUTO: 80 FL (ref 80–100)
MICROALBUMIN UR-MCNC: 115.6
MICROALBUMIN/CREAT RATIO PNL UR: 79.5 (ref 0–30)
MO BLASTS SUSPECT FLAG (OHS): 110
MONOCYTES # BLD AUTO: 0.4 10*3/UL (ref 0.1–1.3)
MONOCYTES NFR BLD AUTO: 7 %
MUCOUS THREADS URNS QL MICRO: NORMAL
NEUTROPHILS # BLD AUTO: 2.81 10*3/UL (ref 2.1–9.2)
NEUTROPHILS NFR BLD AUTO: 44 %
NITRITE UR QL STRIP: NEGATIVE
NRBC BLD AUTO-RTO: 0 % (ref 0–0.2)
PH UR STRIP: 6 [PH] (ref 4.5–8)
PLATELET # BLD AUTO: 240 10*3/UL (ref 130–400)
PLATELET CLUMPS SUSPECT FLAG (OHS): 10
PMV BLD AUTO: 11.2 FL (ref 7.4–10.4)
POTASSIUM SERPL-SCNC: 4 MMOL/L (ref 3.5–5.1)
PROT SERPL-MCNC: 7.7 G/DL (ref 6.4–8.3)
PROT UR QL STRIP: NORMAL
RBC # BLD AUTO: 4.86 10*6/UL (ref 4–5.2)
RBC #/AREA URNS HPF: NORMAL /[HPF] (ref 0–5)
SODIUM SERPL-SCNC: 140 MMOL/L (ref 136–145)
SP GR UR STRIP: 1.02 (ref 1–1.03)
SQUAMOUS EPITHELIAL, UA: NORMAL
TRIGL SERPL-MCNC: 72 MG/DL (ref 37–140)
TSH SERPL-ACNC: 0.75 M[IU]/L (ref 0.35–4.94)
UROBILINOGEN UR STRIP-ACNC: NORMAL
VLDLC SERPL CALC-MCNC: 14 MG/DL
WBC # SPEC AUTO: 6.4 10*3/UL (ref 4.5–11)
WBC #/AREA URNS HPF: NORMAL /[HPF] (ref 0–5)

## 2022-04-24 VITALS
DIASTOLIC BLOOD PRESSURE: 80 MMHG | OXYGEN SATURATION: 97 % | HEIGHT: 68 IN | BODY MASS INDEX: 44.01 KG/M2 | WEIGHT: 290.38 LBS | SYSTOLIC BLOOD PRESSURE: 134 MMHG

## 2022-04-30 NOTE — PROGRESS NOTES
Patient:   Fernanda Singh            MRN: [id number]            FIN: [id number]               Age:   57 years     Sex:  Female     :  1960   Associated Diagnoses:   None   Author:   Kaleigh Echavarria MD      Pre-Op Dx:  57 BF with cervical dysplasia, postmenopausal bleeding, leiomyoma        Plan:  [facility] ECC D&C, possible myomectomy/polypectomy    Reviewed resident's H&P.  Agree with plan.  Proceed with case

## 2022-04-30 NOTE — OP NOTE
DATE OF SURGERY:    05/10/2019    SURGEON:  Ernie Reese MD    attending physician:  Ernie Reese MD    PREOPERATIVE DIAGNOSIS:  Screening colonoscopy with history of uterine cancer.    POSTOPERATIVE DIAGNOSIS:  Colon polyps at the rectosigmoid junction.    PROCEDURE:  Colonoscopy with cold biopsy polypectomy.    COMPLICATIONS:  None.    ESTIMATED BLOOD LOSS:  Minimal.    FINDINGS:  There were several hyperplastic-looking polyps at the rectosigmoid junction.  Most of them were removed with cold forceps.  As long as those are all just hyperplastic, she could be rescoped again in 5 years.  The rest of the scope was clear.    PROCEDURE IN DETAIL:  After the risks and benefits of the procedure were explained to the patient, informed consent was obtained.  She was taken to endoscopy on 05/10/2019.  She was placed on the endoscopy table in the left lateral decubitus position.  Over the course of the procedure, she was administered propofol by Anesthesiology.  I performed a rectal examination which was negative.  I then inserted the colonoscope all the way to the base of the cecum, identified the appendiceal orifice and the ileocecal valve.  I centered the scope and was then able to retract, looking around in a 360 degree fashion.  The entire colon was completely clear.  At the rectosigmoid junction, there were some small 3 to 5 mm polyps that looked hyperplastic.  Multiple piecemeal removal was done with cold forceps.  I retroflexed in the rectum.  She has some small hemorrhoids.  Scope was removed.  She tolerated the procedure well and was taken to recovery room in stable condition.      RECOMMENDATIONS:  Rescope in 5 years, as long as all the biopsies are hyperplastic.  If any of them are adenomatous, I would recommend 3 years.        ______________________________  MD AMINA Concepcion/MYKE  DD:  05/10/2019  Time:  10:15AM  DT:  05/10/2019  Time:  10:27AM  Job #:  [id number]

## 2022-04-30 NOTE — OP NOTE
Patient:   Fernanda Singh            MRN: [id number]            FIN: [id number]               Age:   57 years     Sex:  Female     :  1960   Associated Diagnoses:   None   Author:   Daniel Chappell MD      Hysteroscopy D&C with Polypectomy and Cold Knife Conization  Date of surgery: 2018  Preop diagnosis: Postmenopausal bleeding, cervical dysplasia   Postop diagnosis: same  Procedure: CKC and Hysteroscopy D&C with polypectomy  Staff physicians: Dr. Farris Jericho  Resident phyisicians: Dr. Daniel Chappell  Anesthesia: General Endotracheal    IVF:700 mL  UOP: 200 mL    EBL: 20 mL  Fluid Deficit: 450 ml  Specimen: Cervical cone biopsy, ECC, endometrial polyp, and endometrial curretings.  Complications: none   Findigs: EUA revealed a 10 week size uterus with adequate mobility and descent.   Intraoperative findings revealed normal bilateral tubal ostia, atrophic endometrium with a large posterior mucosal fibroid and small polyp endometrial polyp at 10 o'clock.     Indication and Consent  Patient is a 57  with PMB, submucosal fribroid, and cervical dysplasia who presented for scheduled CKC and hysteroscopy d&c. She understood that the risk include but are not limited to pain, bleeding, infection, visceral or vascular injury, or uterine perforation. Patient stated understanding and the consent for was signed.    The patient was taken to the operating room with IV fluids running. General anesthesia was obtained without difficulty. The patient was prepped and draped in the routine fashion in the dorsal lithotomy position. Red rubber catheritization was performed and the bladder was drained.       A weighted speculum was inserted into the vagina and the cervix was visualized. A tenaculum was placed on the anterior aspect of the cervix. Lugol's solution was placed on the cervix and the transformation zone was identified. Stay sutures were place at 3 and 9 o'clock. 10 ccs of pitressin were injected  circumferentially around the cervix. Using a 10 blade, a cervical cone biopsy was excised and tagged at 12:00. Hemostasis was acheived with the rollerball.    Attention was then turned to the hysteroscopy. A 5mm hysteroscope was introduced through the cervix under direct visualization. The uterine cavity was inspected and the findings were as above. Using the hysteroscopic scissors, the polyp was cut at the base. The scissors were removed, and the hysteroscopic graspers were introduced and the polyp along with the entire hysteroscopy were removed. An endometrial curretage was then performed. All instruments were removed. Monsel's solution was then applied to the cervical bed. Next, surgical was placed into the cervial bed and tied into place using the stay sutures. Tenaculum sites were noted to be hemostatic.  Counts were correct times three. Dr. Echavarria was present and scrubbed for the entire procedure. The patient tolerated the procedure well. She was given a prescription for pain medicine and follow up at the [facility] gyn clinic in 2 weeks.

## 2022-04-30 NOTE — H&P
Patient:   Fernanda Singh            MRN: [id number]            FIN: [id number]               Age:   57 years     Sex:  Female     :  1960   Associated Diagnoses:   None   Author:   Daniel Chappell MD      HPI: 56yo  with PMB , submucosal fibroid, and cervical dysplasia presents for preop assessment for hysteroscopic myomectomy and CKC. She reports an improvement in spotting with Provera. She has a h/o CHF, HTN, CKD, obesity and is s/p Cardiology clearance (18 - with recs below).    Endosee findings: 3/2018  Atrophic endometrium noted, however unable to see past large anterior fundal fibroid, unable to see tubal ostia. Normal cervical canal.  EMB path: Benign endometrium with stromal breakdown. Separate fragments of dysplastic squamous epithelium, a high-grade lesion (HGSIL) cannot be ruled-out.    Pt has a h/o abnormal Pap: ASC-H pap with HPV 18/45 positive, benign ectocervical bx with insufficient ECC on colpo. Based on persistent abnormal findings on EMB (dysplastic squamous epithelium) will also recommend CKC.     OB/Gyn Hx:  -Menopause at age 53yo.  -Denies h/o STIs.  -Last mammogram BIRADS 2, 2017.  -Pap/Colpo hx above  -FT  x3  PMH: CHF, GERN, HTN, CKD, Class III Obesity   PSH: colonoscopy, EGD, BLT, L breast benitn mass excision  FHx:  no breast, ovary, cervix, uterine, colon CA  Social: occ ETOH, no illicits, smokes 20cigarettes/day  All: NKDA  Meds: amlodipine, antivery, ASA, coreg, hydralazine, lasix, lisinopril, paxil, protonix, provera, zantac    Objective:  T: 36.8 °C (Oral) HR: 77(Peripheral) RR: 18 BP: 127/82   HT: 172 cm HT: 172 cm WT: 126.8 kg WT: 126.8 kg BMI: 42.86   PHYSICAL EXAM  General: NAD, A/Ox3  RRR  CTAB  Abdomen: soft, nondistended, nontender to palpation, no hepatosplenomegaly, no masses palpated, obese  External genitalia: normal female anatomy, no masses/lesions.  Sterile speculum exam: vaginal mucosa normal in appearance. Pink. No masses/lesions.  Cervix well visualized, smooth in contour no masses or lesions. Os normal in appearance, no blood or discharge  Bimanual exam: vaginal with adequate capacity. Uterus 10cm in size, no cervical motion tenderness. Moderate descent, mobile, not broad. No adnexal fullness/tenderness. Normal urethra. Normal bladder.  Labs:   Last Cr 0.9, BUN 14    Assessment/Plan   Counseling:  -Alternatives to this planned procedure were explained to the patient including expectant, medical and other surgical management. This procedure and its risks, reasons, benefits and complications (including injury to bowel, bladder, major blood vessel, ureter, bleeding, possibility of transfusion, infection, scarring, dyspareunia, further surgery, failure of the procedure or fistula formation) were reviewed in detail. Additional risks specific to this patient and procedure include visceral edema, insufficient sampling, possible staged procedure. However, if abnormal pathology from initial procedure, will proceed with hysterectomy rather than continuing with staged procedure. Patient counseled on risk of blood transfusion including but not limited to allergic reaction, transmission HIV, Hep C 1:2million, transmission Hep B 1:250,000.      Plan:  - Surgical consents signed. Preop testing ordered. Surgery case requested. Instructions reviewed, including NPO after midnight. Patient given date and time for EAC appointment.  - Follow-up pre-op labs.  - Type and screen, UPT on morning of surgery.  - Per Card recs, hold ASA 5 days before procedure to resume after  - 3g ancef for infection ppx  - Order 20u pitressin in 30cc injectable saline on AM of surgery  - Rx for Cytotec to take buccally morning of procedure  - SCDs for DVT prophylaxis.  - Surgical plan: CKC--> ECC --> Hysteroscopic Myomectomy with D&C on 5/1/2018 with GYN team   - Postop appt: to be scheduled      Addendum by Angelito REYES, May S on April 20, 2018 21:12:31 CDT (Verified)  I have seen  this patient and agree with note above.  We discussed the likelihood that all of the myoma would not be removed in one surgery. In the event that her cervical pathology returns abnormal, she will need a hysterectomy as the next step.  Informed consent obtained, specifically I reiterated the risks of: infection, bleeding, damage to nearby organs, inadedeep cone with excess bleeding requiring emergent hysterectomy, return to operating room for problems, finding of malignancy, anesthetic risks, or other unpredicted risks.   Patient able to describe planned procedure in her own words.  Expected recovery time reviewed, as well as normal postoperative course.      PREOP Conference Note:  Hysteroscopic myomectomy vs just D&C given size of fibroid, likely need for staged procedure, and the need to use Sorbitol as distension media with h/o CHF. Discuss with Angelito

## 2022-04-30 NOTE — ED PROVIDER NOTES
Patient:   Fernanda Singh            MRN: [id number]            FIN: [id number]               Age:   59 years     Sex:  Female     :  1960   Associated Diagnoses:   Angioedema   Author:   Evgeny Calvo MD      Basic Information   Time seen: Date & time 2019 22:00:00.   History source: Patient.   Arrival mode: Private vehicle.   History limitation: None.   Additional information: Chief Complaint from Nursing Triage Note : Chief Complaint   2019 21:56 CST     Chief Complaint           pt c/o lower lip swelling upon awakening @ 1500. denies tongue swelling. airway patent. +Lisinopril  .   Provider/Visit info:   Time Seen:  Evgeny Calvo MD / 2019 21:55  .   History of Present Illness   The patient presents with lower lip swelling which began at 1500 today.  patient took lisinopril at 1300 today. .  The onset was 2019  and 1500  .  The course/duration of symptoms is worsening.  Location: lips. The character of symptoms is swelling.  The degree at onset was minimal.  The degree at present is severe.  Potential allergen(s) to medication(s): lisinopril.  Risk factors consist of lisinopril.  Prior episodes: none.  Therapy today: none.  Associated symptoms: swelling.        Review of Systems   Constitutional symptoms:  No fever, no chills, no sweats.    Skin symptoms:  No rash,    Eye symptoms:  No recent vision problems,    ENMT symptoms:  Negative except as documented in HPI.   Respiratory symptoms:  No shortness of breath, no cough.    Cardiovascular symptoms:  No chest pain, no tachycardia.    Gastrointestinal symptoms:  No abdominal pain, no nausea, no vomiting.    Genitourinary symptoms:  No dysuria,    Musculoskeletal symptoms:  No back pain, no Muscle pain.    Neurologic symptoms:  No headache, no dizziness.              Additional review of systems information: All other systems reviewed and otherwise negative.      Health Status   Allergies:    Allergic Reactions  (Selected)  No Known Allergies,    Allergies (1) Active Reaction  No Known Allergies None Documented  .   Medications:  (Selected)   Inpatient Medications  Ordered  Coreg 12.5 mg oral tablet: 25 mg, form: Tab, Oral, BIDWMeal, first dose 19 10:36:00 CST, STAT  Heparin Flush 100 U/mL - 5 mL: 500 units, form: Injection, IV Push, Once-chemo, first dose 19 9:22:00 CST, stop date 19 9:22:00 CST, Routine, Days 1  Heparin Flush 100 U/mL - 5 mL: 500 units, form: Injection, IV Push, Once-chemo, first dose 19 14:02:00 CST, stop date 19 14:02:00 CST, Routine, Days 1  Triple Antibiotic Ointment topical: 1 lalita, form: Ointment, TOP, Once, first dose 16 16:00:00 CDT, stop date 16 16:00:00 CDT, Apply to procedure site prior to discharge.  ceFAZolin: 1 gm, form: Injection, IM, Once, first dose 17 15:20:00 CST, stop date 17 15:20:00 CST, STAT, 24  Prescriptions  Prescribed  Bentyl 10 mg oral capsule: 10 mg = 1 cap(s), Oral, QID, PRN PRN abdominal pain, 30 to 60 minutes before meals, # 28 cap(s), 0 Refill(s), Pharmacy: [facility]  Coreg 25 mg oral tablet: 25 mg = 1 tab(s), Oral, BID, # 180 tab(s), 3 Refill(s), Pharmacy: [facility]  Lasix 40 mg oral tablet: 40 mg = 1 tab(s), Oral, Daily, # 90 tab(s), 0 Refill(s), Pharmacy: [facility]  Paxil 40 mg oral tablet: 40 mg = 1 tab(s), Oral, Daily, # 30 tab(s), 11 Refill(s), Pharmacy: [facility]  Zofran ODT 4 mg oral tablet, disintegratin mg = 1 tab(s), Oral, q8hr, PRN PRN as needed for nausea/vomiting, allow tablet to dissolve on tongue, # 6 tab(s), 0 Refill(s), Pharmacy: [facility]  amlodipine 10 mg oral tablet: 10 mg = 1 tab(s), Oral, Daily, # 90 tab(s), 0 Refill(s), Pharmacy: [facility]  gabapentin 400 mg oral capsule: 400 mg = 1 cap(s), Oral, QID, take in place of the 100mg tablets, # 120 cap(s), 5 Refill(s), Pharmacy: [facility]  hydrALAZINE  25 mg oral tablet: 25 mg = 1 tab(s), Oral, BID, # 180 tab(s), 0 Refill(s), Pharmacy: [facility]  lisinopril 40 mg oral tablet: 1/2 tab(s), Oral, BID, # 90 tab(s), 0 Refill(s), Pharmacy: [facility]  Documented Medications  Documented  BusPIRone HCL 5MG TAB: 5 mg = 1 tab(s), Oral, BID  Chantix Starter Pack 0.5 mg-1 mg oral tablet:   PROCHLORPERAZINE MAL 10MG TAB: 10 mg = 1 tab(s), Oral, TID  Protonix 40 mg ORAL enteric coated tablet: 40 mg = 1 tab(s), Oral, Daily, # 30 tab(s), 0 Refill(s)  Zantac 150 oral tablet: 150 mg = 1 tab(s), Oral, BID  aspirin 81 mg oral tablet, CHEWABLE: 81 mg = 1 tab(s), Oral, Daily.      Past Medical/ Family/ Social History   Medical history:    Resolved  H/O: HTN (hypertension) ([id number]):  Resolved.  CHF (congestive heart failure) (428.0):  Resolved.  Chronic kidney disease (585.9):  Resolved.  Esophageal reflux (GERD) (530.81):  Resolved..   Surgical history:    Biopsy Gastrointestinal on 11/13/2019 at 59 Years.  Comments:  11/13/2019 12:01 Gena Acosta RN  auto-populated from documented surgical case  Esophagogastroduodenoscopy on 11/13/2019 at 59 Years.  Comments:  11/13/2019 12:01 Gena Acosta RN  auto-populated from documented surgical case  Biopsy of thyroid ([id number]) in the month of 7/2019 at 58 Years.  Colonoscopy (None) on 5/10/2019 at 58 Years.  Comments:  5/10/2019 10:16 Gabbie Rascon RN  auto-populated from documented surgical case  Polypectomy (None) on 5/10/2019 at 58 Years.  Comments:  5/10/2019 10:16 Gabbie Rascon RN  auto-populated from documented surgical case  Catheter Insertion Mediport (None) on 8/22/2018 at 58 Years.  Comments:  8/22/2018 11:16 CDT - Giorgio ADAME, Gi  auto-populated from documented surgical case  Cervical Conization (None) on 5/1/2018 at 57 Years.  Comments:  5/1/2018 8:43 CDT - Jennifer ADAME , Erika RODRIGUEZ  auto-populated from documented surgical  case  Myomectomy Hysteroscopic (None) on 2018 at 57 Years.  Comments:  2018 8:43 SWEETIET - Erika Rodriguez RN  auto-populated from documented surgical case  Dilation & Curettage on 2018 at 57 Years.  Comments:  2018 8:43 Erika Gudino RN  auto-populated from documented surgical case  Esophagogastroduodenoscopy on 2014 at 54 Years.  Comments:  2014 17:01 Judith Quintanilla RN  auto-populated from documented surgical case  Colonoscopy on 2014 at 54 Years.  Comments:  2014 17:01 Judith Quintanilla RN  auto-populated from documented surgical case  Polypectomy on 2014 at 54 Years.  Comments:  2014 15:51 CST - Ciro ADAME, Sulema FONSECA  auto-populated from documented surgical case  Bilateral tubal ligation ([id number]).  left breat - small mass removed.  Comments:  2017 13:13 Stella Carter RN  2016.   Family history:    Diabetes mellitus type 2  Father ()  Cardiac disease  Father ()  Acute myocardial infarction.  Father ()  Heart failure.  Mother ()  Hypertension.  Mother ()  Father ()  Brother  Esophageal cancer.  Brother  Renal failure syndrome.  Mother ()  .      Physical Examination               Vital Signs   Vital Signs   2019 21:56 CST     Peripheral Pulse Rate     67 bpm                             Respiratory Rate          17 br/min                             SpO2                      100 %                             Oxygen Therapy            Room air                             Systolic Blood Pressure   136 mmHg                             Diastolic Blood Pressure  85 mmHg    2019 12:25 CST     Heart Rate Monitored      56 bpm  LOW                             Respiratory Rate          18 br/min                             SpO2                      98 %                             Systolic Blood Pressure   148 mmHg  HI                             Diastolic Blood Pressure  92 mmHg  HI     11/13/2019 12:12 CST     Heart Rate Monitored      62 bpm                             Respiratory Rate          18 br/min                             SpO2                      100 %                             Systolic Blood Pressure   143 mmHg  HI                             Diastolic Blood Pressure  89 mmHg    11/13/2019 9:00 CST      Temperature Oral          36.5 DegC                             Temperature Oral (calculated)             97.70 DegF                             Heart Rate Monitored      64 bpm                             Respiratory Rate          18 br/min                             SpO2                      98 %                             Systolic Blood Pressure   131 mmHg                             Diastolic Blood Pressure  85 mmHg  .   General:  Alert, no acute distress.    Skin:  Warm, dry.    Head:  Normocephalic.   Neck:  Trachea midline.   Eye:  Pupils are equal, round and reactive to light, extraocular movements are intact.    Ears, nose, mouth and throat:  Oral mucosa moist, no pharyngeal erythema or exudate, moderate lower lip swelling, no tongue, or pharyngeal swelling. .    Cardiovascular:  Regular rate and rhythm, No murmur, Normal peripheral perfusion, No edema.    Respiratory:  Lungs are clear to auscultation, respirations are non-labored, breath sounds are equal, Symmetrical chest wall expansion.    Gastrointestinal:  Soft, Nontender, Non distended, Normal bowel sounds, No organomegaly.    Back:  Normal alignment.   Musculoskeletal:  No swelling, no deformity.    Neurological:  Alert and oriented to person, place, time, and situation, normal motor observed, normal speech observed.    Psychiatric:  Cooperative.      Medical Decision Making   Documents reviewed:  Emergency department nurses' notes.   Results review:  Lab results : Lab View   11/13/2019 22:10 CST     Sodium Lvl                140 mmol/L                             Potassium Lvl             3.8 mmol/L                              Chloride                  111 mmol/L  HI                             CO2                       23 mmol/L                             Calcium Lvl               9.0 mg/dL                             Glucose Lvl               170 mg/dL  HI                             BUN                       16 mg/dL                             Creatinine                1.20 mg/dL                             eGFR-AA                   60 mL/min  LOW                             eGFR-YUNG                  49 mL/min  LOW                             Bili Total                0.2 mg/dL                             Bili Direct               <0.1 mg/dL                             Bili Indirect             unable to calc mg/dL                             AST                       10 unit/L  LOW                             ALT                       25 unit/L                             Alk Phos                  120 unit/L  HI                             Total Protein             7.7 gm/dL                             Albumin Lvl               3.4 gm/dL                             Globulin                  4.30 gm/mL  HI                             A/G Ratio                 0.8 ratio  LOW                             WBC                       7.5 x10(3)/mcL                             RBC                       4.36 x10(6)/mcL                             Hgb                       11.5 gm/dL  LOW                             Hct                       37.5 %                             Platelet                  317 x10(3)/mcL                             MCV                       86.0 fL                             MCH                       26.4 pg                             MCHC                      30.7 gm/dL  LOW                             RDW                       16.4 %  HI                             MPV                       9.5 fL                             Abs Neut                  3.69 x10(3)/mcL                              Neutro Auto               49 %  NA                             Lymph Auto                41 %  NA                             Mono Auto                 8 %  NA                             Eos Auto                  2 %  NA                             Abs Eos                   0.1 x10(3)/mcL                             Basophil Auto             0 %  NA                             Abs Neutro                3.69 x10(3)/mcL                             Abs Lymph                 3.0 x10(3)/mcL                             Abs Mono                  0.6 x10(3)/mcL                             Abs Baso                  0.0 x10(3)/mcL                             IG%                       0 %  NA                             IG#                       0.0400  NA  .      Reexamination/ Reevaluation   Vital signs   results included from flowsheet : Vital Signs   11/13/2019 22:40 CST     Peripheral Pulse Rate     69 bpm                             Heart Rate Monitored      66 bpm                             Respiratory Rate          17 br/min                             SpO2                      99 %                             Oxygen Therapy            Room air                             Systolic Blood Pressure   127 mmHg                             Diastolic Blood Pressure  85 mmHg                             Mean Arterial Pressure, Cuff              99 mmHg    11/13/2019 22:09 CST     Peripheral Pulse Rate     72 bpm                             Heart Rate Monitored      72 bpm                             Respiratory Rate          18 br/min                             SpO2                      100 %                             Oxygen Therapy            Room air    11/13/2019 21:56 CST     Peripheral Pulse Rate     67 bpm                             Respiratory Rate          17 br/min                             SpO2                      100 %                             Oxygen Therapy            Room air                              Systolic Blood Pressure   136 mmHg                             Diastolic Blood Pressure  85 mmHg    11/13/2019 12:25 CST     Heart Rate Monitored      56 bpm  LOW                             Respiratory Rate          18 br/min                             SpO2                      98 %                             Systolic Blood Pressure   148 mmHg  HI                             Diastolic Blood Pressure  92 mmHg  HI    11/13/2019 12:12 CST     Heart Rate Monitored      62 bpm                             Respiratory Rate          18 br/min                             SpO2                      100 %                             Systolic Blood Pressure   143 mmHg  HI                             Diastolic Blood Pressure  89 mmHg    11/13/2019 9:00 CST      Temperature Oral          36.5 DegC                             Temperature Oral (calculated)             97.70 DegF                             Heart Rate Monitored      64 bpm                             Respiratory Rate          18 br/min                             SpO2                      98 %                             Systolic Blood Pressure   131 mmHg                             Diastolic Blood Pressure  85 mmHg     Course: progressing as expected.      Impression and Plan   Diagnosis   Angioedema (SMZ10-OG T78.3XXA)      Calls-Consults   -  11/13/2019 22:48:00 , Brenden REYES, MIRIAM Langley, consult.    Plan   Condition: Guarded.    Disposition: Admit time  11/13/2019 22:48:00, Place in Observation Telemetry Unit.

## 2022-05-04 NOTE — HISTORICAL [facility] CERNER
This is a historical note converted from [facility]. Formatting and pictures may have been removed.  Please reference Antonia for original formatting and attached multimedia. DATE OF SURGERY: 8/22/2018.  ???  ATTENDING SURGEON: Serjio Rodríguez MD.  ???  RESIDENT SURGEON: Carrie Potter MD, PGY-5.  ???  ASSISTANT SURGEON: Tae Alberts MD, PGY-1.  ???  PROCEDURE PERFORMED: Right internal jugular mediport placement.  ???  PREOPERATIVE DIAGNOSIS: Endometrial cancer.  ???  POSTOPERATIVE DIAGNOSIS: Same.  ???  ANESTHESIA: General.  ???  INDICATIONS FOR PROCEDURE: Ms. Singh is a 58 year-old female with endometrial cancer. Chemotherapy was?indicated. Decision was made to performed mediport placement.  ???  PROCEDURE IN DETAIL: Patient was taken in to the operating room and placed supine, in slight Trendelenburg, with the neck turned to the contralateral side. The right neck and chest were prepped and draped in the usual sterile fashion. A time out was performed. A sterile ultrasound probe was used to confirm the internal jugular vein was in its usual anatomic location, and was patent and compressible. An 8 Fr Clearvue Powerport kit was used. All parts were flushed and tested prior to use. Using a micropuncture needle, the vein was punctured and the guidewire advanced without resistance. The micropuncture sheath was inserted and wire removed. Next the J-wire was inserted under fluoroscopic guidance without resistance or difficulty. The micropuncture sheath was removed and a 1 cm skin nick was made at the insertion site. Next an incision was made on the chest was made about 2 fingerbreadths inferior to insertion site, and a subcutaneous pocket was bluntly made to snugly fit the powerport. Next, using Seldinger technique, a dilator was passed over the wire to dilate the subcutaneous tissue. The dilator was exchanged for the catheter sheath and advanced under fluorscopic guidance. The catheter was then advanced into the sheath, and  the sheath was carefully withdrawn. Under fluoro guidance, the catheter was withdrawn until the tip was in satisfactory position. The catheter was then trimmed at the level of insertion into the mediport and gently advanced over the hub of the port. Port was accessed and was able to be aspirated and flushed without resistance. Finally, subcutaneous interrupted sutures were placed in chest incision and all skin sites closed with 4-0 PDS. Postop verification chest x-ray shows tip of catheter in atriocaval junction without adverse changes.  ???  All laps and instrument counts were correct at the end of the procedure.  ??  ???  SPECIMEN: None.  ???  BLOOD LOSS:?2 mL.  ???  COMPLICATIONS: None.  ???  ???  ______________________  Carrei Potter MD

## 2022-05-04 NOTE — HISTORICAL [facility] CERNER
This is a historical note converted from [facility]. Formatting and pictures may have been removed.  Please reference Antonia for original formatting and attached multimedia. [facility] Surgery History and Physical  ?  History of Present Illness:?  ?  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  Pt is s/p RATLH/BSO done in [place] with Dr. Coates on 6/18/18. Denies N/V/F/C/HA/CP/SOB.?  ?  Treatment history:  ??Dilation & Curettage (05/01/2018)?  ??Excision of Cervix, Via Natural or Artificial Opening, Diagnostic (05/01/2018)?  ??Hysteroscopy, surgical; with sampling (biopsy) of endometrium and/or polypectomy, with or without D & C (05/01/2018)?  ??Myomectomy Hysteroscopic (None) (05/01/2018)?  ??Simple repair of superficial wounds of scalp, neck, axillae, external genitalia, trunk and/or extremities (including hands and feet); 2.5 cm or less (11/30/2017)?  ??Colonoscopy (09/24/2014)?  ??Endoscopic polypectomy of large intestine (09/24/2014)?  ??Esophagogastroduodenoscopy, flexible, transoral; diagnostic, including collection of specimen(s) by brushing or washing, when performed (separate procedure) (09/24/2014)?  ??Other endoscopy of small intestine (09/24/2014)?  ?  ?  ?  PMH: As Above  PSH: as above  FH: Father, mother and brother with MIs?  SH: Currently smokes, remote EtOH use and no drug use?  Allergies: NKDA  ?  ??Review of Systems?  ?  Review of Systems Constitutional_denies weight changes; denies fever/chills; denies nausea/vomiting  Respiratory_denies difficulty breathing  Cardiovascular_denies chest pain  Gastrointestinal_denies abdominal pain; denies changes in bowel habits  Genitourinary_denies vaginal bleeding; denies vaginal discharge  Hema/Lymph_denies any bleeding  Musculoskeletal_denies muscle aches or pain  Neurologic_denies headaches  All Other ROS_negative except HPI?  ?  Physical Examination?  Vital Signs?  Wt:  279.98????BMI:?42.93?????Temp:?97.34?????HR: 59????RR:?20??BP: 136/20  ?  General_Well nourished, AAOX3, NAD  Neck_Normal range of motion, no obvious masses  Respiratory_Effort normal with no accessory muscle usage, lungs CTAB  Cardiovascular_RRR, S1/2 present with no added sounds  Gastrointestinal_Abdomen soft/non-tender, no organomegaly noted, trocar sites well healed with no erythema or evidence of infection  Genitourinary_Declined.  Musculoskeletal_No swelling or edema of limbs  Neurologic_AAOx3, behavior normal, affect normal?  ?  Review / Management?  .?  Laboratory Results?: ?No New Labs  ?  Impression and Plan?  ?  Plan:  ?  59 yo F smoker with pmh of endometrial carcinosarcoma stage 1A, presents for mediport placement eval.  - Pt is s/p RATLH/BSO done in [place] with Dr. Coates on 6/18/18  -Pt conselled about smoking sensation.?  -Pt discussed with Dr. Flores.  -mediport scheduled for Aug-22nd.??  ?  ?  ?  Interval H&P:  Pt seen, consents confirmed, chart reviewed. Agree with above assessment. To OR today for mediport placement  ?   Sondra Lemus MD  [employer] General Surgery?Resident, PGY1

## 2022-05-04 NOTE — HISTORICAL [facility] CERNER
This is a historical note converted from [employer]. Formatting and pictures may have been removed.  Please reference [employer] for original formatting and attached multimedia. Chief Complaint  Tc PONCE, uterine cancer  History of Present Illness  59yo with stage 1A endometrial carcinosarcoma here for surveillance visit via [employer] platform.?30 mos NEM.  Denies vaginal bleeding. Sometimes has pink-tinged discharge when wiping, unsure of where its coming from. Denies rectal bleeding. Sexually active, mild pain but no issues, no postcoital bleeding. Smoking 1PPD. Reports she just had CT with her PCP for persistent hematuria. Received COVID vaccine.  ?   Tumor History:  1. 3/21/18: In office Endosee and EMB at [facility] in [place], unable to see the tubal ostia due to large fibroid. Benign endometrium with stromal breakdown. Separate fragments of dysplastic squamous epithelium, high-grade lesion cannot be ruled out.  2. 4/9/18 Cardiology clearance for hysteroscopy D&C (held ASA x 5 days)?  3. 5/1/2018 Hysteroscopy D&C/CKC.:??Mild to moderate dysplasia with endocervical glandular extension.??Mild dysplasia involving the endocervical margin at the 12 o?clock aspect. ECC:??Scant strips of endocervical epithelium and dysplastic squamous epithelium. High-grade lesion cannot be ruled out.. Endometrial polyp:??Small fragments of poorly differentiated adenocarcinoma with malignant stroma consistent with carcinosarcoma.?  4. 6/18/18: RA-TLH, BSO, pelvic LN dissection, cysto.  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 The tumor invades the superficial myometrium. Lymph nodes negative  5. 8/2018: completed 3 sessions of HDR brachytherapy to vaginal cuff  6. Adjuvant Taxol/Carboplatin x4 8/29/18-10/31/18 followed by carbo x 2??1/3/19-1/24/19 (Taxol omitted due to neuropathy)  ?   (date)  16.9 (5/2018)  4.7 ?(7/2019)  7.6 ?(5/2020)  6.5? (8/2020)  6.7  (12/2020)  5.0 (6/2021)  Gynecological History  Menstrual Status Intake: Hysterectomy  Review of Systems  Constitutional: No fever, No chills.  Respiratory: No shortness of breath.  Cardiovascular: No chest pain, No syncope.  Gastrointestinal: No nausea, No vomiting, No diarrhea, No constipation.  Genitourinary: No dysuria, No hematuria, No abnormal discharge or bleeding.  Neurologic: Alert and oriented X4  Physical Exam  Vitals & Measurements  T:?36.6? ?C (Oral)? HR:?63(Peripheral)? RR:?20? BP:?142/83? SpO2:?100%?  HT:?172.00?cm? WT:?130.000?kg? BMI:?43.94?  General: NAD, A/Ox3  Neck: supple, no masses, goiter palpable, no nodules. No lymphadenopathy.  Respiratory: CTAB, no wheezes, rales, or rhonchi  Cardiovascular: RRR no murmurs, rubs, or gallops  Abdomen: soft, nondistended,?nontender to palpation, no appreciable hepatosplenomegaly, no masses palpated; no hernias  Extremities: no edema, no calf tenderness  Breast: Symmetric, no lesions or palpable?masses.? No skin puckering, no nipple retraction or discharge.? No?supraclavicular or axillary masses.?  ?   External genitalia: Normal female anatomy.?No masses/lesions. Normal appearing urethral meatus. Normal appearing external anus, no infragluteal lesions. No inguinal lymphadenopathy. mild labial agglutination. Straight cath urine specimen obtained.  Speculum?exam: vaginal mucosa normal in appearance, pale pink. No masses/lesions.? Cervix absent.? No blood or abnormal discharge. Vaginal cuff intact.  Bimanual exam: Uterus absent.?Normal vaginal capacity.?No adnexal fullness/tenderness. Urethra and Bladder?nontender.  Rectal: No external masses. Rectovaginal septum normal without nodularity.  Assessment/Plan  1.?Uterine carcinosarcoma?C54.1  NEM on exam today  Continue q6mo surveillance  HCM UTD  Ordered:  Urine Culture [id number], Routine collect, 07/07/21 12:28:00 CDT, Urine, Bladder Tap, Nurse collect, Stop date 07/07/21 12:28:00 CDT, Endometrial cancer   Hematuria  ?  2.?Hematuria?R31.9  Straight cath specimen obtained for UA, culture  CT unremarkable  Ordered:  Urine Culture [id number], Routine collect, 07/07/21 12:28:00 CDT, Urine, Bladder Tap, Nurse collect, Stop date 07/07/21 12:28:00 CDT, Endometrial cancer  Hematuria  ?  RTC per Dr. Coates  ?   I saw and evaluated the patient. Discussed with resident and agree with resident?s findings and plan as documented in the resident?s note  I was physically present during the critical or key portions of the service furnished by the resident and your participation in the management of  the patient.  No evidence of malignancy on my exam.  Kaleigh Echavarria MD?   Problem List/Past Medical History  Ongoing  Anxiety  Back pain  Chemotherapy-induced peripheral neuropathy  CHF (congestive heart failure)(  Confirmed  )  Degeneration of lumbar intervertebral disc  Dysplasia of cervix  Fibroid uterus  GERD (gastroesophageal reflux disease)(  Confirmed  )  HTN (hypertension)(  Confirmed  )  Kidney disease(  Confirmed  )  Low back pain  Morbid obesity  Screening for malignant neoplasm of breast  Screening for osteoporosis  Stromal sarcoma  Tobacco user  Uterine carcinosarcoma  Historical  Breast cancer  Chemotherapy-induced nausea  Chemotherapy-induced neuropathy  CHF (congestive heart failure)  Chronic kidney disease  Esophageal reflux (GERD)  H/O: HTN (hypertension)  Left breast mass  Morbid obesity(  Probable Diagnosis  )  Numbness and tingling sensation of skin  Pregnant  Pregnant  Pregnant  Procedure/Surgical History  Biopsy Gastrointestinal (11/13/2019)  Esophagogastroduodenoscopy (11/13/2019)  Esophagogastroduodenoscopy, flexible, transoral; with biopsy, single or multiple (11/13/2019)  Excision of Stomach, Pylorus, Via Natural or Artificial Opening Endoscopic, Diagnostic (11/13/2019)  Biopsy of thyroid (07.2019)  Colonoscopy (None) (05/10/2019)  Colonoscopy, flexible; with biopsy, single or multiple  (05/10/2019)  Excision of Sigmoid Colon, Via Natural or Artificial Opening Endoscopic, Diagnostic (05/10/2019)  Polypectomy (None) (05/10/2019)  Catheter Insertion Mediport (None) (08/22/2018)  Fluoroscopy of Superior Vena Cava using Low Osmolar Contrast, Guidance (08/22/2018)  Insertion of Infusion Device into Superior Vena Cava, Percutaneous Approach (08/22/2018)  Insertion of Totally Implantable Vascular Access Device into Chest Subcutaneous Tissue and Fascia, Open Approach (08/22/2018)  Insertion of tunneled centrally inserted central venous access device, with subcutaneous port; age 5 years or older (08/22/2018)  Cervical Conization (None) (05/01/2018)  Conization of cervix, with or without fulguration, with or without dilation and curettage, with or without repair; cold knife or laser (05/01/2018)  Dilation & Curettage (05/01/2018)  Excision of Cervix, Via Natural or Artificial Opening Endoscopic, Diagnostic (05/01/2018)  Excision of Cervix, Via Natural or Artificial Opening, Diagnostic (05/01/2018)  Extraction of Endometrium, Via Natural or Artificial Opening, Diagnostic (05/01/2018)  Hysteroscopy, surgical; with sampling (biopsy) of endometrium and/or polypectomy, with or without D & C (05/01/2018)  Myomectomy Hysteroscopic (None) (05/01/2018)  Simple repair of superficial wounds of scalp, neck, axillae, external genitalia, trunk and/or extremities (including hands and feet); 2.5 cm or less (11/30/2017)  [Endoscopic] polypectomy of rectum (09/24/2014)  Colonoscopy (09/24/2014)  Colonoscopy through stoma; with removal of tumor(s), polyp(s), or other lesion(s) by hot biopsy forceps or bipolar cautery. (09/24/2014)  Endoscopic polypectomy of large intestine (09/24/2014)  Esophagogastroduodenoscopy (09/24/2014)  Esophagogastroduodenoscopy, flexible, transoral; diagnostic, including collection of specimen(s) by brushing or washing, when performed (separate procedure) (09/24/2014)  Other endoscopy of small  intestine (09/24/2014)  Polypectomy (09/24/2014)  Bilateral tubal ligation  left breat - small mass removed   Medications  amlodipine 10 mg oral tablet, 10 mg= 1 tab(s), Oral, Daily, 3 refills  aspirin 81 mg oral tablet, CHEWABLE, 81 mg= 1 tab(s), Oral, Daily, 3 refills  Coreg 25 mg oral tablet, 25 mg= 1 tab(s), Oral, BID, 3 refills  Crestor 10 mg oral tablet, 10 mg= 1 tab(s), Oral, Daily, 3 refills  gabapentin 600 mg oral tablet, 600 mg= 1 tab(s), Oral, TID, 3 refills  glipiZIDE 10 mg oral tablet, 10 mg= 1 tab(s), Oral, Daily, 1 refills  Heparin Flush 100 U/mL - 5 mL, 500 units= 5 mL, IV Push, Once-chemo  Heparin Flush 100 U/mL - 5 mL, 500 units= 5 mL, IV Push, Once-chemo  hydrALAZINE 50 mg oral tablet, 50 mg= 1 tab(s), Oral, BID, 3 refills  Lasix 40 mg oral tablet, 40 mg= 1 tab(s), Oral, Daily, 3 refills  Triple Antibiotic Ointment topical, 1 lalita, TOP, Once  Allergies  lisinopril?(Angioedema of lips)  Social History  Abuse/Neglect  No, No, Yes, 07/07/2021  No, No, Yes, 07/01/2021  Alcohol  Past, Beer, 11/11/2020  Employment/School - No Risk, 03/12/2013  disabled, 02/14/2018  Exercise - Does not exercise, 03/12/2013  Exercise duration: 0., 07/07/2021  Financial/Legal Situation  None, 04/30/2021  Home/Environment - No Risk, 03/12/2013  Lives with Alone. Living situation: Home/Independent. Mobile home, 04/30/2021    [employer], 03/30/2021  Nutrition/Health - No Risk, 03/12/2013  Regular, 02/14/2018  Other - No Risk, 03/12/2013  Sexual - No Risk, 03/12/2013  Sexually active: Yes., 01/26/2018  Spiritual/Cultural  [employer], 03/30/2021  Substance Use - Denies Substance Abuse, 03/12/2013  Never, 11/11/2020  Tobacco - High Risk, 03/12/2013  10 or more cigarettes (1/2 pack or more)/day in last 30 days, No, 07/07/2021  10 or more cigarettes (1/2 pack or more)/day in last 30 days, No, Cigarettes, 20 per day., 07/01/2021  Marital Status    Family History  Acute myocardial infarction.: Father.  Cardiac disease:  Father.  Cirrhosis of liver: Brother.  Diabetes mellitus type 2: Father.  Esophageal cancer.: Brother.  Heart failure.: Mother.  Hypertension.: Mother, Father and Brother.  Renal failure syndrome.: Mother.  Immunizations  Vaccine Date Status Comments   COVID-19 mRNA, LNP-S, PF - Moderna 03/11/2021 Recorded    influenza virus vaccine, inactivated 10/29/2020 Given    influenza virus vaccine, inactivated 11/04/2019 Given    influenza virus vaccine, inactivated 11/04/2019 Recorded Unit: Unknown  Route: Intramuscular  : sanofi pasteur   influenza virus vaccine, inactivated 01/26/2018 Given    influenza virus vaccine, inactivated 01/26/2018 Recorded Unit: Unknown  Route: Intramuscular  : sanofi pasteur   influenza virus vaccine, inactivated 10/17/2016 Given    influenza virus vaccine, inactivated 10/17/2016 Recorded Unit: Unknown  Route: Intramuscular  : Podaddies   Lab Results  Test Name Test Result Date/Time Comments   Sodium Lvl 141 mmol/L 06/03/2021 08:32 CDT    Potassium Lvl 3.6 mmol/L 06/03/2021 08:32 CDT    Chloride 106 mmol/L 06/03/2021 08:32 CDT    CO2 23 mmol/L 06/03/2021 08:32 CDT    Calcium Lvl 9.7 mg/dL 06/03/2021 08:32 CDT    Glucose Lvl 187 mg/dL (High) 06/03/2021 08:32 CDT    BUN 15.4 mg/dL 06/03/2021 08:32 CDT    Creatinine 0.91 mg/dL 06/03/2021 08:32 CDT    eGFR-AA 82 (Low) 06/03/2021 08:32 CDT    Hgb A1c 6.8 % 04/08/2021 09:18 CDT     5.0 unit/mL 06/03/2021 08:32 CDT    WBC 6.7 x10(3)/mcL 06/03/2021 08:32 CDT    RBC 4.50 x10(6)/mcL 06/03/2021 08:32 CDT    Hgb 11.5 gm/dL (Low) 06/03/2021 08:32 CDT    Hct 36.6 % 06/03/2021 08:32 CDT    Platelet 253 x10(3)/mcL 06/03/2021 08:32 CDT    UA Appear Clear 04/08/2021 09:15 CDT Result created by Discern Rule.   UA Color LIGHT YELLOW 04/08/2021 09:15 CDT    UA Spec Grav 1.012 04/08/2021 09:15 CDT    UA Bili Negative 04/08/2021 09:15 CDT Result created by Discern Rule.   UA pH 6.0 04/08/2021 09:15 CDT    UA  Urobilinogen Normal 04/08/2021 09:15 CDT Result created by Discern Rule.   UA Blood Negative 04/08/2021 09:15 CDT Result created by Discern Rule.   UA Glucose Negative 04/08/2021 09:15 CDT Result created by Discern Rule.   UA Ketones Negative 04/08/2021 09:15 CDT Result created by Discern Rule.   UA Protein Negative 04/08/2021 09:15 CDT Result created by Discern Rule.   UA Nitrite Negative 04/08/2021 09:15 CDT Result created by Discern Rule.   UA Leuk Est Negative 04/08/2021 09:15 CDT Result created by Discern Rule.   UA WBC Interp 0-2 04/08/2021 09:15 CDT Result created by Discern Rule.   UA RBC Interp 3-5 (Abnormal) 04/08/2021 09:15 CDT Result created by Discern Rule.   UA Bact Interp None Seen 04/08/2021 09:15 CDT    UA Squam Epi Interp  (Abnormal) 04/08/2021 09:15 CDT Result created by Discern Rule.   UA Hyal Cast Interp 0-2 (Abnormal) 04/08/2021 09:15 CDT Result created by Discern Rule.   Diagnostic Results  (06/07/2021 14:40 CDT CT Abdomen and Pelvis W W/O Contrast)  Reason For Exam  r31.29;Other (please specify)  ?  Radiology Report  CT Abdomen and Pelvis W W/O Contrast  ?  REASON FOR STUDY: Microscopic hematuria?  ?  TECHNIQUE: CT imaging was performed of the abdomen and pelvis before  and after the administration of intravenous contrast. Dose length  product is 6685 mGycm. Automatic exposure control, adjustment of mA/kV  or iterative reconstruction technique was used to limit radiation  dose.  ?  COMPARISON: CT performed 3/8/2019?  ?  FINDINGS:?  ?  Liver/biliary: Normal liver. ?No radiodense gallstones or biliary  dilatation.?  ?  Pancreas: Normal.  ?  Spleen: Normal.  ?  Adrenals: Normal.  ?  Genitourinary: No urinary stone or hydronephrosis. Symmetric renal  enhancement and excretion. 10 mm cyst arising from the lower right  kidney. No enhancing renal lesion. Ureters are only partially  opacified on the delayed postcontrast images with no ureteral filling  defect appreciated. Only mild bladder  distention with no definitive  bladder abnormality. Uterus not identified. No adnexal mass.  ?  Stomach/bowel: Possible small hiatal hernia. Oral contrast has reached  the distal small bowel. No dilated bowel. Normal appendix. No  definitive sites of bowel inflammation.?  ?  Lymph nodes: No pathologically enlarged lymph node identified.  ?  Peritoneum: No ascites or free air. No fluid collection.  ?  Vasculature: Mild aortic and iliac artery calcifications. No abdominal  aortic aneurysm.?  ?  Abdominal wall: Adjacent fat-containing periumbilical hernias  measuring up to about 5 cm.  ?  Lung bases: No consolidation or pleural effusion.  ?  Bones: No acute osseous findings.  ?  IMPRESSION: Evaluation of the bladder is suboptimal due to  underdistention, but there are no definitive findings to identify a  cause of hematuria.  ?  Signature Line  Electronically Signed By: Jesus Cosby MD  Date/Time Signed: 06/08/2021 11:15 [1] (12/18/2020 13:38 CST MG Screening)  Reason For Exam  z12.39;Screening  ?  Radiology Report  ?- MG SCREENING BILATERAL  ?  BILATERAL DIGITAL SCREENING MAMMOGRAM WITH CAD: 12/18/2020  HISTORY: 60-year-old woman presents for screening mammogram. She has a history of benign left breast biopsy in 2016 and endometrial carcinoma. ?  ?  COMPARISONS: Comparison is made to exams dated: ?8/9/2019 mammogram - [facility]?  ?  TECHNIQUE: Digital mammography views were performed. Current study was evaluated with a Computer Aided Detection (CAD) system.?  ?  BREAST COMPOSITION: The breasts are almost entirely fatty. ?  ?  FINDINGS:?  Biopsy clip, left breast.  ?  No suspicious mass, asymmetry, distortion, or calcification is identified.  ?  ?  IMPRESSION: BENIGN  Right Breast: Negative (BI-RADS 1)  Left Breast: Benign (BI-RADS 2)  ?  Recommendations: [2] (04/29/2021 11:55 CDT US Thyroid)  * Final Report *  ?  Reason For Exam  e04.9;Enlarged Thyroid  ?  Radiology Report  TECHNIQUE: Gray-scale and  color Doppler images of the thyroid.  ?  HISTORY: Enlarged Thyroid  ?  COMPARISON: Thyroid ultrasound 07/18/2019 (report only)  ?  FINDINGS:?  ?  The right lobe of the thyroid measures 5.8 x 1.5 x 2.4 cm and  demonstrates a few cystic nodules. The largest nodule measures 5 mm.  The right lobe of the thyroid is otherwise unremarkable.  ?  The thyroid isthmus is unremarkable.  ?  The left lobe of the thyroid measures 5.4 x 1.4 x 2.6 cm and  demonstrates several nodules. The largest nodule measures 1.9 cm and  appears solid/isoechoic. This nodule may contain a few  microcalcifications (TI RADS 4). There is a 1.3 cm solid hyperechoic  nodule (benign). The other nodules on the left are subcentimeter in  size. The left lobe of the thyroid is otherwise unremarkable.  ?  Thyroid vascularity is within normal limits. There are no abnormal  lymph nodes within the visualized portions of the neck.  ?  ?  IMPRESSION:  ?  1. Multinodular thyroid. The dominant nodule in the inferior aspect of  the left lobe of the thyroid meets criteria for FNA and this is  recommended.  ?  2. None of the other nodules meet criteria for FNA or surveillance.  ? [3] (12/18/2020 13:32 CST XR Bone Density Complete)  Reason For Exam  Z13.820;Screening  ?  Radiology Report  HISTORY.  Screening  ?  REFERENCE EXAM.  No reference studies are available for comparison.?  ?  FINDINGS.  ?  BMD ? ? T-score ?  1.566 ? ? ? ?3.0 ? ? ? ? Lumbar Spine (L1-L4)  1.279 ? ? ? ?2.1 ? ? ? ?Total Left Femur  1.295 ? ? ? ?2.3 ? ? ? ?Total Right Femur  1.287 ? ? ? ?2.2 ? ? ? ?Mean Femur  ?  IMPRESSION.  Measured bone mineral density within normal limits.  ? [4]     [1]?CT Abdomen and Pelvis W W/O Contrast; Harjeet REYES, Jesus STALLINGS 06/07/2021 14:40 CDT  [2]?MG Screening; Luis Armando REYES, Alexis AVILEZ 12/18/2020 13:38 CST  [3]?US Thyroid; Celina REYES, Cong WHITT 04/29/2021 11:55 CDT  [4]?XR Bone Density Complete; Flavio Ruelas MD 12/18/2020 13:32 CST

## 2022-05-04 NOTE — HISTORICAL [facility]ER
This is a historical note converted from [place]. Formatting and pictures may have been removed.  Please reference Antonia for original formatting and attached multimedia. Chief Complaint  EGD  History of Present Illness  58 yo female with?a 6 month history of abdominal pain presenting for EGD. She states that the pain occurs?with diarrhea. Located in the epigastric region, described as sharp. No alleviating factors noted. Denies a history of bloody bowel movements. She had a CT done recently that was unremarkable. Underwent colonoscopy recently that was notable for a hyperplastic polyp. She has been in her normal state of health recently and has been npo this morning.  Review of Systems  Review of systems negative?for?CP, SOB, f/c, n/v  Physical Exam  Vitals & Measurements  T:?36.5? ?C (Oral)? HR:?64(Monitored)? RR:?18? BP:?131/85? SpO2:?98%? WT:?131.8?kg?  General: NAD  Neuro: AOx3, speech intact  HEENT: NCAT  Heart: regular rate, extremities well perfused  Lungs: unlabored breathing on room air  Abd: s/nt/nd, no surgical scars  Ext: no edema, 2+ radial pulse b/l  Skin: no?rashes present  Assessment/Plan  58 yo female with a history of abdominal pain presenting for EGD  - Risks, benefits, and alternatives to upper endoscopy discussed, she wishes to go forward  - proceed with upper endoscopy   Problem List/Past Medical History  Ongoing  Anxiety  Back pain  Breast cancer  Chemotherapy-induced nausea  Chemotherapy-induced neuropathy  CHF (congestive heart failure)(  Confirmed  )  Degeneration of lumbar intervertebral disc  Dysplasia of cervix  Fibroid uterus  GERD (gastroesophageal reflux disease)(  Confirmed  )  HTN (hypertension)(  Confirmed  )  Kidney disease(  Confirmed  )  Left breast mass  Malignant neoplasm of uterus, part unspecified  Morbid obesity(  Probable Diagnosis  )  Numbness and tingling sensation of skin  Stromal sarcoma  Tobacco user  Uterine carcinosarcoma  Historical  CHF (congestive heart  failure)  Chronic kidney disease  Esophageal reflux (GERD)  H/O: HTN (hypertension)  Procedure/Surgical History  Biopsy of thyroid (07.2019)  Colonoscopy (None) (05/10/2019)  Colonoscopy, flexible; with biopsy, single or multiple (05/10/2019)  Excision of Sigmoid Colon, Via Natural or Artificial Opening Endoscopic, Diagnostic (05/10/2019)  Polypectomy (None) (05/10/2019)  Catheter Insertion Mediport (None) (08/22/2018)  Fluoroscopy of Superior Vena Cava using Low Osmolar Contrast, Guidance (08/22/2018)  Insertion of Infusion Device into Superior Vena Cava, Percutaneous Approach (08/22/2018)  Insertion of Totally Implantable Vascular Access Device into Chest Subcutaneous Tissue and Fascia, Open Approach (08/22/2018)  Insertion of tunneled centrally inserted central venous access device, with subcutaneous port; age 5 years or older (08/22/2018)  Cervical Conization (None) (05/01/2018)  Conization of cervix, with or without fulguration, with or without dilation and curettage, with or without repair; cold knife or laser (05/01/2018)  Dilation & Curettage (05/01/2018)  Excision of Cervix, Via Natural or Artificial Opening Endoscopic, Diagnostic (05/01/2018)  Excision of Cervix, Via Natural or Artificial Opening, Diagnostic (05/01/2018)  Extraction of Endometrium, Via Natural or Artificial Opening, Diagnostic (05/01/2018)  Hysteroscopy, surgical; with sampling (biopsy) of endometrium and/or polypectomy, with or without D & C (05/01/2018)  Myomectomy Hysteroscopic (None) (05/01/2018)  [Endoscopic] polypectomy of rectum (09/24/2014)  Colonoscopy (09/24/2014)  Colonoscopy through stoma; with removal of tumor(s), polyp(s), or other lesion(s) by hot biopsy forceps or bipolar cautery. (09/24/2014)  Endoscopic polypectomy of large intestine (09/24/2014)  Esophagogastroduodenoscopy (09/24/2014)  Esophagogastroduodenoscopy, flexible, transoral; diagnostic, including collection of specimen(s) by brushing or washing, when performed  (separate procedure) (09/24/2014)  Other endoscopy of small intestine (09/24/2014)  Polypectomy (09/24/2014)  Bilateral tubal ligation  left breat - small mass removed   Medications  Inpatient  buffered lidocaine 2% - 0.5 ml syringe, 10 mg= 0.5 mL, Subcutaneous, As Directed  ceFAZolin, 1 gm, IM, Once  Coreg 12.5 mg oral tablet, 25 mg= 2 tab(s), Oral, BIDWMeal  Heparin Flush 100 U/mL - 5 mL, 500 units= 5 mL, IV Push, Once-chemo  Heparin Flush 100 U/mL - 5 mL, 500 units= 5 mL, IV Push, Once-chemo  IVF Lactated Ringers LR Infusion 1,000 mL, 1000 mL, IV  Triple Antibiotic Ointment topical, 1 lalita, TOP, Once  Home  amlodipine 10 mg oral tablet, 10 mg= 1 tab(s), Oral, Daily  aspirin 81 mg oral tablet, CHEWABLE, 81 mg= 1 tab(s), Oral, Daily  Bentyl 10 mg oral capsule, 10 mg= 1 cap(s), Oral, QID, PRN  BusPIRone HCL 5MG TAB, 5 mg= 1 tab(s), Oral, BID,? ?Still taking, not as prescribed: as needed for anxiety / needs refill  Chantix Starter Pack 0.5 mg-1 mg oral tablet,? ?Not taking: needs refill  Coreg 25 mg oral tablet, 25 mg= 1 tab(s), Oral, BID, 3 refills  gabapentin 400 mg oral capsule, 400 mg= 1 cap(s), Oral, QID, 5 refills,? ?Not taking  hydrALAZINE 25 mg oral tablet, 25 mg= 1 tab(s), Oral, BID  Lasix 40 mg oral tablet, 40 mg= 1 tab(s), Oral, Daily  lisinopril 40 mg oral tablet, 1/2 tab(s), Oral, BID  Paxil 40 mg oral tablet, 40 mg= 1 tab(s), Oral, Daily, 11 refills  PROCHLORPERAZINE MAL 10MG TAB, 10 mg= 1 tab(s), Oral, TID,? ?Still taking, not as prescribed: as needed  Protonix 40 mg ORAL enteric coated tablet, 40 mg= 1 tab(s), Oral, Daily  Zantac 150 oral tablet, 150 mg= 1 tab(s), Oral, BID  Zofran ODT 4 mg oral tablet, disintegrating, 4 mg= 1 tab(s), Oral, q8hr, PRN  Allergies  No Known Allergies  Social History  Abuse/Neglect  No, No, Yes, 11/13/2019  No, 11/04/2019  Alcohol  Past, 09/11/2019  Employment/School - No Risk, 03/12/2013  disabled, 02/14/2018  Exercise - Does not exercise, 03/12/2013  Home/Environment -  No Risk, 03/12/2013  Lives with Significant other. Living situation: Home/Independent. Injuries/Abuse/Neglect in household: No. Feels unsafe at home: No., 01/26/2018  Nutrition/Health - No Risk, 03/12/2013  Regular, 02/14/2018  Other - No Risk, 03/12/2013  Sexual - No Risk, 03/12/2013  Sexually active: Yes., 01/26/2018  Substance Use - Denies Substance Abuse, 03/12/2013  Never, 05/26/2015  Tobacco - High Risk, 03/12/2013  10 or more cigarettes (1/2 pack or more)/day in last 30 days, No, 11/13/2019  5-9 cigarettes (between 1/4 to 1/2 pack)/day in last 30 days, No, 11/04/2019  Family History  Acute myocardial infarction.: Father.  Cardiac disease: Father.  Diabetes mellitus type 2: Father.  Esophageal cancer.: Brother.  Heart failure.: Mother.  Hypertension.: Mother, Father and Brother.  Renal failure syndrome.: Mother.  Immunizations  Vaccine Date Status   influenza virus vaccine, inactivated 11/04/2019 Given   influenza virus vaccine, inactivated 01/26/2018 Given   influenza virus vaccine, inactivated 10/17/2016 Given       chronic epigastric abdominal pain, intermittently occurring, stabbing in nature, resolves on its own.? States she will have diarrhea with it sometimes and other times cannot identify any obvious triggers, food or otherwise.? When it occurs she just bears with it until it resolves after an hour or so.? She is on pantoprazole which controls heartburn and reflux symptoms.? Denies any dysphagia.? Weight is stable.? BMs are regular without melena or BRB.? No NSAID use.? She does continue to smoke.

## 2022-05-11 ENCOUNTER — CLINICAL SUPPORT (OUTPATIENT)
Dept: INTERNAL MEDICINE | Facility: CLINIC | Age: 62
End: 2022-05-11
Attending: NURSE PRACTITIONER
Payer: MEDICARE

## 2022-05-11 ENCOUNTER — OFFICE VISIT (OUTPATIENT)
Dept: INTERNAL MEDICINE | Facility: CLINIC | Age: 62
End: 2022-05-11
Payer: MEDICARE

## 2022-05-11 VITALS
RESPIRATION RATE: 20 BRPM | DIASTOLIC BLOOD PRESSURE: 82 MMHG | WEIGHT: 293 LBS | BODY MASS INDEX: 43.4 KG/M2 | SYSTOLIC BLOOD PRESSURE: 148 MMHG | HEIGHT: 69 IN | TEMPERATURE: 98 F | HEART RATE: 55 BPM

## 2022-05-11 DIAGNOSIS — M51.36 DEGENERATIVE DISC DISEASE, LUMBAR: ICD-10-CM

## 2022-05-11 DIAGNOSIS — F41.9 ANXIETY: ICD-10-CM

## 2022-05-11 DIAGNOSIS — I10 HYPERTENSION, UNSPECIFIED TYPE: ICD-10-CM

## 2022-05-11 DIAGNOSIS — E11.9 TYPE 2 DIABETES MELLITUS WITHOUT COMPLICATION, WITHOUT LONG-TERM CURRENT USE OF INSULIN: Primary | ICD-10-CM

## 2022-05-11 DIAGNOSIS — C54.1 CARCINOSARCOMA OF ENDOMETRIUM: ICD-10-CM

## 2022-05-11 DIAGNOSIS — T45.1X5S PERIPHERAL NEUROPATHY DUE TO AND NOT CONCURRENT WITH CHEMOTHERAPY: ICD-10-CM

## 2022-05-11 DIAGNOSIS — G62.0 PERIPHERAL NEUROPATHY DUE TO AND NOT CONCURRENT WITH CHEMOTHERAPY: ICD-10-CM

## 2022-05-11 DIAGNOSIS — Z13.5 ENCOUNTER FOR SCREENING FOR DIABETIC RETINOPATHY: ICD-10-CM

## 2022-05-11 DIAGNOSIS — E04.2 MULTINODULAR THYROID: ICD-10-CM

## 2022-05-11 DIAGNOSIS — I50.9 CONGESTIVE HEART FAILURE, UNSPECIFIED HF CHRONICITY, UNSPECIFIED HEART FAILURE TYPE: ICD-10-CM

## 2022-05-11 DIAGNOSIS — K21.9 GASTROESOPHAGEAL REFLUX DISEASE WITHOUT ESOPHAGITIS: ICD-10-CM

## 2022-05-11 PROBLEM — N87.9 DYSPLASIA OF CERVIX: Status: ACTIVE | Noted: 2022-05-11

## 2022-05-11 PROBLEM — E66.01 MORBID OBESITY DUE TO EXCESS CALORIES: Status: ACTIVE | Noted: 2017-08-17

## 2022-05-11 PROBLEM — M51.369 DEGENERATIVE DISC DISEASE, LUMBAR: Status: ACTIVE | Noted: 2017-08-17

## 2022-05-11 PROBLEM — Z72.0 TOBACCO USER: Status: ACTIVE | Noted: 2022-05-11

## 2022-05-11 PROCEDURE — 1159F PR MEDICATION LIST DOCUMENTED IN MEDICAL RECORD: ICD-10-PCS | Mod: CPTII,,, | Performed by: NURSE PRACTITIONER

## 2022-05-11 PROCEDURE — 3077F PR MOST RECENT SYSTOLIC BLOOD PRESSURE >= 140 MM HG: ICD-10-PCS | Mod: CPTII,,, | Performed by: NURSE PRACTITIONER

## 2022-05-11 PROCEDURE — 2025F 7 FLD RTA PHOTO W/O RTNOPTHY: CPT | Mod: PBBFAC,CPTII | Performed by: INTERNAL MEDICINE

## 2022-05-11 PROCEDURE — 1160F RVW MEDS BY RX/DR IN RCRD: CPT | Mod: CPTII,,, | Performed by: NURSE PRACTITIONER

## 2022-05-11 PROCEDURE — 3079F PR MOST RECENT DIASTOLIC BLOOD PRESSURE 80-89 MM HG: ICD-10-PCS | Mod: CPTII,,, | Performed by: NURSE PRACTITIONER

## 2022-05-11 PROCEDURE — 3008F BODY MASS INDEX DOCD: CPT | Mod: CPTII,,, | Performed by: NURSE PRACTITIONER

## 2022-05-11 PROCEDURE — 1159F MED LIST DOCD IN RCRD: CPT | Mod: CPTII,,, | Performed by: NURSE PRACTITIONER

## 2022-05-11 PROCEDURE — 99215 OFFICE O/P EST HI 40 MIN: CPT | Mod: PBBFAC | Performed by: NURSE PRACTITIONER

## 2022-05-11 PROCEDURE — 92228 IMG RTA DETC/MNTR DS PHY/QHP: CPT | Mod: PBBFAC

## 2022-05-11 PROCEDURE — 3077F SYST BP >= 140 MM HG: CPT | Mod: CPTII,,, | Performed by: NURSE PRACTITIONER

## 2022-05-11 PROCEDURE — 99214 PR OFFICE/OUTPT VISIT, EST, LEVL IV, 30-39 MIN: ICD-10-PCS | Mod: S$PBB,,, | Performed by: NURSE PRACTITIONER

## 2022-05-11 PROCEDURE — 99214 OFFICE O/P EST MOD 30 MIN: CPT | Mod: S$PBB,,, | Performed by: NURSE PRACTITIONER

## 2022-05-11 PROCEDURE — 1160F PR REVIEW ALL MEDS BY PRESCRIBER/CLIN PHARMACIST DOCUMENTED: ICD-10-PCS | Mod: CPTII,,, | Performed by: NURSE PRACTITIONER

## 2022-05-11 PROCEDURE — 3008F PR BODY MASS INDEX (BMI) DOCUMENTED: ICD-10-PCS | Mod: CPTII,,, | Performed by: NURSE PRACTITIONER

## 2022-05-11 PROCEDURE — 3079F DIAST BP 80-89 MM HG: CPT | Mod: CPTII,,, | Performed by: NURSE PRACTITIONER

## 2022-05-11 RX ORDER — ROSUVASTATIN CALCIUM 10 MG/1
10 TABLET, COATED ORAL NIGHTLY
COMMUNITY
Start: 2021-10-15 | End: 2022-07-27 | Stop reason: SDUPTHER

## 2022-05-11 RX ORDER — CARVEDILOL 25 MG/1
25 TABLET ORAL 2 TIMES DAILY
COMMUNITY
Start: 2021-10-15 | End: 2022-07-27 | Stop reason: SDUPTHER

## 2022-05-11 RX ORDER — GABAPENTIN 600 MG/1
600 TABLET ORAL
COMMUNITY
Start: 2021-06-03 | End: 2022-05-11 | Stop reason: SDUPTHER

## 2022-05-11 RX ORDER — GLIPIZIDE 10 MG/1
10 TABLET ORAL
Qty: 90 TABLET | Refills: 1 | Status: SHIPPED | OUTPATIENT
Start: 2022-05-11 | End: 2022-10-24 | Stop reason: HOSPADM

## 2022-05-11 RX ORDER — GABAPENTIN 600 MG/1
600 TABLET ORAL 3 TIMES DAILY
Qty: 270 TABLET | Refills: 1 | Status: SHIPPED | OUTPATIENT
Start: 2022-05-11 | End: 2023-02-22

## 2022-05-11 RX ORDER — HYDRALAZINE HYDROCHLORIDE 50 MG/1
50 TABLET, FILM COATED ORAL 2 TIMES DAILY
COMMUNITY
Start: 2022-01-23 | End: 2022-07-27 | Stop reason: SDUPTHER

## 2022-05-11 RX ORDER — CYCLOBENZAPRINE HCL 10 MG
10 TABLET ORAL 3 TIMES DAILY PRN
Qty: 30 TABLET | Refills: 3 | Status: SHIPPED | OUTPATIENT
Start: 2022-05-11 | End: 2022-05-11

## 2022-05-11 RX ORDER — ASPIRIN 81 MG/1
81 TABLET ORAL
COMMUNITY
End: 2022-07-27 | Stop reason: SDUPTHER

## 2022-05-11 RX ORDER — BACLOFEN 10 MG/1
10 TABLET ORAL 3 TIMES DAILY
Qty: 30 TABLET | Refills: 1 | Status: SHIPPED | OUTPATIENT
Start: 2022-05-11 | End: 2022-07-14 | Stop reason: SDUPTHER

## 2022-05-11 RX ORDER — AMLODIPINE BESYLATE 10 MG/1
10 TABLET ORAL
COMMUNITY
Start: 2021-10-15 | End: 2022-07-27 | Stop reason: SDUPTHER

## 2022-05-11 RX ORDER — GLIPIZIDE 10 MG/1
10 TABLET ORAL DAILY
COMMUNITY
Start: 2022-01-13 | End: 2022-05-11 | Stop reason: SDUPTHER

## 2022-05-11 RX ORDER — FUROSEMIDE 40 MG/1
40 TABLET ORAL DAILY
COMMUNITY
Start: 2021-10-15 | End: 2022-07-27 | Stop reason: SDUPTHER

## 2022-05-11 NOTE — ASSESSMENT & PLAN NOTE
Take your medicines, even if you feel well   ?Watch for changes in your symptoms- notify the office with any concerns!  ?Call the office if you gain weight suddenly - Weigh yourself every morning after you urinate, but before you eat breakfast. Wear roughly the same amount of clothing every time. And make sure to write down your weight every day on a calendar. Call if your weight goes up by 2 or more pounds (1 kilogram) in 1 day, or 4 or more pounds (2 kilograms) in 1 week. When you have heart failure, sudden weight gain is a sign that your body could be holding on to too much fluid. You might need a change in your medicines.  ?Cut down on salt - Try not to add salt at the table or when you cook. Also, avoid foods that come in boxes and cans, unless their labels say they are low in sodium. The best choices for food are fresh or fresh frozen foods, and foods you prepare yourself  ?Lose weight, if you are overweight - If you are overweight, your heart has to work extra hard to keep up with your body's needs.  ?Stop smoking - Smoking worsens heart failure and increases the chance that you will have a heart attack or die.  ?Limit alcohol - If you are a woman, do not have more than 1 drink a day. If you are a man, do not have more than 2.  ?Be active      Pt meets PACU discharge criteria. pt signed out of PACU by Dr Islas.

## 2022-05-11 NOTE — PROGRESS NOTES
MARICHUY Leonardo   [facility]  [address] [facility] [address]      PATIENT NAME: Fernanda Sinhg  : 1960  DATE: 22  MRN: [id number]      Billing Provider: MARICHUY Leonardo  Level of Service:   Patient PCP Information     Provider PCP Type    MARICHUY Leonardo General          Reason for Visit / Chief Complaint: Follow-up and Back Pain (Lab review)       History of Present Illness / Problem Focused Workflow     Fernanda Singh presents to the clinic with Follow-up and Back Pain (Lab review)     Previous PCP: Rachel Goff 2020   PmHx: mild CAD, hypertension, GERD, CKD, chronic tobacco use, obesity, breast mass (), uterine CA (2017), H.Pylori (), colon polyps  SHx: left breast lumpectomy (), thyroid bx (2019), tubal, total hysterectomy (), EGD , colonoscopy 2019--hyperplastic polyp   FHx: Esophageal cancer (brother), cirrhosis (brother), Renal failure, heart dz, HF, HTN, HLD   Complaints today: Establish care     Ms. Michael is a pleasant 59 yo female presenting to re-establish primary care. Has not followed up with a PCP since . States previous PCP moved. She is following [facility] here for CAD and CHF (per report). Also following Heme/Onc and GYN clinics for h/o uterine ca diagnosed in . She had a left breast mass removed in . [facility] path was benign. She is UTD on colon cancer screening. H/o colon polyp. Recommended repeat colonoscopy 2024. She has a h/o diabetes diagnosed ~1-2 years ago. Was given Glipizide by last PCP before she moved. No recent A1c on file. H/o goiter with thyroid nodules. [facility] had a thyroid bx 2019 that was negative per report. She needs a refill on Glipizide. No other problems stated.    Health Maintenance:   Colon Ca Screening-colonoscopy 2019--hyperplastic polyp  Breast Ca Screening-Mammo 2020, benign   Bone Density 2020  "WNL    (4/30/21): Pt presenting to review labs. Since last visit, she was seen in [facility] 4/14/21. Underwent fundus photo which was negative for DR.    Lab review:      A1c 6.8   LDL 48   TSH WNL   RBCs on UA. Noted intermittently since 2019.     Thyroid US 4/29/21 revealed a multinodular thyroid with at least one thyroid nodule on left side (1.9 cm) meeting the criteria for FNA.     Pt c/o right hand pain and numbness to first three fingers. Concerned about CTS. States remodeling home and some of the tools have been difficult to use. C/o stiffness and inability to close hand into a fist on occasion upon awakening.   Denies fever, chills, cough, chest pain, SOB, abd pain, dysuria, gross hematuria, leg pain, or any other concerns.      (10/15/21): Pt presenting for routine f/u.   Lab review:   HgA1c 5.7%      Renal indices stable   FLP WNL   Hgb 11.9-stable and improved   RBCs on UA 6-10   She underwent CT A/P in June 2/2 [facility]. No overt path on CT other than renal cyst of the lower right kidney. Urine cytology negative. States GYN explained that RBCS in UA likely due to vaginal irritation/inflammation 2/2 radiation tx. No gross hematuria.     C/o lower back pain x 1 mth. Long-standing hx of back pain but worse over past month. No overt injuries/accidents or anything "out of the norm." States pain worse at night, occasionally awakening her from her sleep. Exacerbated by standing > 10 min or strenuous activity over the course of the day. Denies falls, lower ext weakness, B/B incontinence. Relieved with rest and Naprosyn PRN--takes at night. States years since last imaging.     Bp slightly elevated. Asymptomatic. Taking medications as prescribed. F/u with [facility] today.    Today's Visit (5/11/22): Pt presenting for routine f/u. She had labs completed in 4/2022 but missed a f/u to review. She was referred to ENT last April for a multinodular thyroid with one meeting criteria for FNA but didn't make appt. Also " "states she needs to call to re-schedule appts in GYN, oncologic GYN in [place], and [facility] clinic. She had a negative screening mammogram (BI-RAD 2) 1/5/2022. Today, she continues with lower back pain as mentioned at last visit. She was referred to P.T. but ultimately declined appt at the time. Long-standing hx of back pain but worse over past month. No overt injuries/accidents or anything "out of the norm." States pain worse at night, occasionally awakening her from her sleep. Exacerbated by standing > 10 min or strenuous activity over the course of the day. Denies falls, lower ext weakness, B/B incontinence. Relieved with rest and Naprosyn PRN--takes at night. She's also used Flexeril in the past with some relief.        Review of Systems     Review of Systems   Musculoskeletal: Positive for back pain.   All other systems reviewed and are negative.      Medical / Social / Family History   History reviewed. No pertinent past medical history.    Past Surgical History:   Procedure Laterality Date    BILATERAL TUBAL LIGATION      Biopsy Gastrointestinal  11/13/2019    BIOPSY OF THYROID  07/2019    [facility]      [id number]    CERVICAL CONIZATION   W/ LASER  05/01/2018    COLONOSCOPY  05/10/2019    COLONOSCOPY  09/24/2014    DILATION AND CURETTAGE OF UTERUS  05/01/2018    ESOPHAGOGASTRODUODENOSCOPY  09/24/2014    ESOPHAGOGASTRODUODENOSCOPY  11/13/2019    Left breast small mass removal      Myomectomy Hysteroscopic  05/01/2018    POLYPECTOMY  05/10/2019    POLYPECTOMY  09/24/2014       Social History  Ms.  reports that she has been smoking cigarettes. She has been smoking about 1.00 pack per day. She has never used smokeless tobacco. She reports previous alcohol use. She reports previous drug use.    Family History  Ms.'s family history includes Cirrhosis in her brother; Diabetes in her father; Esophageal cancer in her brother; Heart attack in her father; Heart failure in her mother; " Hypertension in her brother, father, and mother; Kidney failure in her mother; Leukemia in her brother.    Medications and Allergies     Medications  Outpatient Medications Marked as Taking for the 5/11/22 encounter (Office Visit) with MARICHUY Leonardo   Medication Sig Dispense Refill    amLODIPine (NORVASC) 10 MG tablet Take 10 mg by mouth.      aspirin (ECOTRIN) 81 MG EC tablet Take 81 mg by mouth.      carvediloL (COREG) 25 MG tablet Take 25 mg by mouth.      furosemide (LASIX) 40 MG tablet Take 40 mg by mouth.      hydrALAZINE (APRESOLINE) 50 MG tablet Take 50 mg by mouth 2 (two) times daily.      rosuvastatin (CRESTOR) 10 MG tablet Take 10 mg by mouth.      [DISCONTINUED] gabapentin (NEURONTIN) 600 MG tablet Take 600 mg by mouth.      [DISCONTINUED] glipiZIDE (GLUCOTROL) 10 MG tablet Take 10 mg by mouth once daily.         Allergies  Review of patient's allergies indicates:   Allergen Reactions    Lisinopril Swelling     Other reaction(s): Angioedema of lips       Physical Examination     Vitals:    05/11/22 1131   BP: (!) 148/82   Pulse:    Resp:    Temp:      Physical Exam  Constitutional:       Appearance: Normal appearance.   HENT:      Head: Normocephalic and atraumatic.      Right Ear: Tympanic membrane, ear canal and external ear normal.      Left Ear: Tympanic membrane, ear canal and external ear normal.      Nose: Nose normal.      Mouth/Throat:      Mouth: Mucous membranes are moist.      Pharynx: Oropharynx is clear.   Eyes:      Extraocular Movements: Extraocular movements intact.      Conjunctiva/sclera: Conjunctivae normal.      Pupils: Pupils are equal, round, and reactive to light.   Cardiovascular:      Rate and Rhythm: Normal rate and regular rhythm.      Pulses: Normal pulses.      Heart sounds: Normal heart sounds.   Pulmonary:      Effort: Pulmonary effort is normal.      Breath sounds: Normal breath sounds.   Abdominal:      General: Bowel sounds are normal.      Palpations:  Abdomen is soft.   Musculoskeletal:         General: Normal range of motion.      Cervical back: Normal range of motion and neck supple.      Lumbar back: Tenderness present.   Skin:     General: Skin is warm and dry.   Neurological:      General: No focal deficit present.      Mental Status: She is alert and oriented to person, place, and time.   Psychiatric:         Mood and Affect: Mood normal.         Behavior: Behavior normal.         Thought Content: Thought content normal.         Judgment: Judgment normal.           Results     Lab Results   Component Value Date    WBC 6.2 10/13/2021    RBC 4.66 10/13/2021    HGB 11.9 (L) 10/13/2021    HCT 38.0 10/13/2021    MCV 81.5 10/13/2021    MCH 25.5 (L) 10/13/2021    MCHC 31.3 10/13/2021    RDW 16.1 (H) 10/13/2021     10/13/2021    MPV 10.5 (H) 10/13/2021     CMP  Sodium Level   Date Value Ref Range Status   10/13/2021 141 136 - 145 mmol/L Final     Potassium Level   Date Value Ref Range Status   10/13/2021 3.8 3.5 - 5.1 mmol/L Final     Carbon Dioxide   Date Value Ref Range Status   10/13/2021 25 22 - 29 mmol/L Final     Blood Urea Nitrogen   Date Value Ref Range Status   10/13/2021 13.1 9.8 - 20.1 mg/dL Final     Creatinine   Date Value Ref Range Status   10/13/2021 0.85 0.55 - 1.02 mg/dL Final     Calcium Level Total   Date Value Ref Range Status   10/13/2021 9.9 8.4 - 10.2 mg/dL Final     Albumin Level   Date Value Ref Range Status   10/13/2021 3.8 3.5 - 5.0 gm/dL Final     Bilirubin Total   Date Value Ref Range Status   10/13/2021 0.4 <<=1.5 mg/dL Final     Alkaline Phosphatase   Date Value Ref Range Status   10/13/2021 97 40 - 150 unit/L Final     Aspartate Aminotransferase   Date Value Ref Range Status   10/13/2021 13 5 - 34 unit/L Final     Alanine Aminotransferase   Date Value Ref Range Status   10/13/2021 17 0 - 55 unit/L Final     Estimated GFR-Non    Date Value Ref Range Status   10/13/2021 73 (L) >>=90 mL/min/1.73 m2 Final     Lab  Results   Component Value Date    CHOL 106 10/13/2021     Lab Results   Component Value Date    HDL 38 10/13/2021     No results found for: LDLCALC  Lab Results   Component Value Date    TRIG 76 10/13/2021     No results found for: CHOLHDL  Lab Results   Component Value Date    TSH 1.2214 04/08/2021     Lab Results   Component Value Date    PHUR 6.0 10/13/2021    PROTEINUA Negative 10/13/2021    GLUCUA Negative 10/13/2021    KETONESU Negative 10/13/2021    OCCULTUA Negative 10/13/2021    NITRITE Negative 10/13/2021    LEUKOCYTESUR Negative 10/13/2021           Assessment and Plan (including Health Maintenance)     Plan:         Health Maintenance Due   Topic Date Due    Hepatitis C Screening  Never done    Cervical Cancer Screening  Never done    Pneumococcal Vaccines (Age 0-64) (1 - PCV) Never done    TETANUS VACCINE  Never done    Shingles Vaccine (1 of 2) Never done    Colorectal Cancer Screening  Never done    COVID-19 Vaccine (3 - Booster for Moderna series) 09/09/2021       Problem List Items Addressed This Visit        Neuro    Degenerative disc disease, lumbar    Current Assessment & Plan     Baclofen PRN  Pt also using Naproxen PRN  Re-refer to [facility]           Relevant Orders    Ambulatory referral/consult to Physical/Occupational Therapy    Diabetic Eye Screening Photo    Peripheral neuropathy due to and not concurrent with chemotherapy    Overview     Chemo-induced             Current Assessment & Plan     Stable              Psychiatric    Anxiety    Current Assessment & Plan     Stable              Ophtho    Encounter for screening for diabetic retinopathy    Current Assessment & Plan     Fundus photo today              Cardiac/Vascular    CHF (congestive heart failure)    Overview     Managed by [facility] in [place], [place].           Current Assessment & Plan       Take your medicines, even if you feel well   ?Watch for changes in your symptoms- notify the office with any  concerns!  ?Call the office if you gain weight suddenly - Weigh yourself every morning after you urinate, but before you eat breakfast. Wear roughly the same amount of clothing every time. And make sure to write down your weight every day on a calendar. Call if your weight goes up by 2 or more pounds (1 kilogram) in 1 day, or 4 or more pounds (2 kilograms) in 1 week. When you have heart failure, sudden weight gain is a sign that your body could be holding on to too much fluid. You might need a change in your medicines.  ?Cut down on salt - Try not to add salt at the table or when you cook. Also, avoid foods that come in boxes and cans, unless their labels say they are low in sodium. The best choices for food are fresh or fresh frozen foods, and foods you prepare yourself  ?Lose weight, if you are overweight - If you are overweight, your heart has to work extra hard to keep up with your body's needs.  ?Stop smoking - Smoking worsens heart failure and increases the chance that you will have a heart attack or die.  ?Limit alcohol - If you are a woman, do not have more than 1 drink a day. If you are a man, do not have more than 2.  ?Be active              HTN (hypertension)    Overview       Currently on Hydralazine 50mg BID, Carvedilol 25mg BID, and Amlodipine 10mg every day           Current Assessment & Plan     Bp slightly elevated today, but asymptomatic. Encouraged medication compliance  Educated on aerobic exercise (3-5 days/week) and a low-fat, low-sodium diet  Avoid excess ETOH consumption. Smoking cessation if applicable  ED precautions (s/s of CVA, etc)  F/u 2 months for re-eval                Oncology    Carcinosarcoma of endometrium    Overview     Managed by [facility]'s GYN clinic and Dr. Coates in [place]           Current Assessment & Plan     Keep scheduled f/u in clinics as above              Endocrine    T2DM (type 2 diabetes mellitus) - Primary    Overview     Current medications: Glipizide 10 mg po  "daily  Medication Adjustments: None, continue current medication  A1c level: 6.4%, goal <7  CBG trends: None provided. Informed that goal FBG < 150  Educated on diabetic diet: Low-fat, Low-carb, Low-cholesterol. Avoid sugary/dark drinks/sodas and white foods (i.e., bread, pasta, potatoes, rice)  Aerobic exercise: 20-30 min/day x 5 days/week  Diabetic Eye Exam: fundus 5/2022  Diabetic Foot Exam: 5/11/22  Microalbumin-U: slightly elevated  Kidney Protection: None at present due to ACE-I allergy (angioedema)  Statin Therapy: Yes, LDL 50s  Educated on Hypoglycemic s/s and interventions. Call office with any questions or concerns  Strict glucose monitoring. Bring blood glucose logs/meter to each   ED precautions discussed             Current Assessment & Plan     Continue current plan as above           Relevant Medications    glipiZIDE (GLUCOTROL) 10 MG tablet    Multinodular thyroid    Current Assessment & Plan     Patient underwent a thyroid US 4/29/21 that revealed:  " 1. Multinodular thyroid. The dominant nodule in the inferior aspect of  the left lobe of the thyroid meets criteria for FNA and this is  recommended.     2. None of the other nodules meet criteria for FNA or surveillance."    She was referred to ENT at that point, but she has missed mx appts over the last year. I will re-refer and update US           Relevant Orders    US Soft Tissue Head Neck Thyroid    Ambulatory referral/consult to ENT       GI    Gastroesophageal reflux disease    Current Assessment & Plan     1. Drink 6-8 glasses of water a day  2. Avoid triggers (i.e., spicy foods, caffeine, chocolate, fatty foods)  3. No smoking  4. Elevate head of bed at night if needed  5. Avoid eating large meals 2-3 hours before bedtime  6. Antacids as needed                   Health Maintenance Topics with due status: Not Due       Topic Last Completion Date    Lipid Panel 10/13/2021    Mammogram 01/05/2022       Future Appointments   Date Time Provider " Department Center   7/14/2022 10:00 AM MARICHUY Leonardo [facility] [place]            Signature:  MARICHUY Leonardo  [facility]  [address] [facility] [address] [facility] [address]    Date of encounter: 5/11/22

## 2022-05-11 NOTE — ASSESSMENT & PLAN NOTE
Bp slightly elevated today, but asymptomatic. Encouraged medication compliance  Educated on aerobic exercise (3-5 days/week) and a low-fat, low-sodium diet  Avoid excess ETOH consumption. Smoking cessation if applicable  ED precautions (s/s of CVA, etc)  F/u 2 months for re-eval

## 2022-05-11 NOTE — ASSESSMENT & PLAN NOTE
"Patient underwent a thyroid US 4/29/21 that revealed:  " 1. Multinodular thyroid. The dominant nodule in the inferior aspect of  the left lobe of the thyroid meets criteria for FNA and this is  recommended.     2. None of the other nodules meet criteria for FNA or surveillance."    She was referred to ENT at that point, but she has missed mx appts over the last year. I will re-refer and update US  "

## 2022-05-11 NOTE — ASSESSMENT & PLAN NOTE
1. Drink 6-8 glasses of water a day  2. Avoid triggers (i.e., spicy foods, caffeine, chocolate, fatty foods)  3. No smoking  4. Elevate head of bed at night if needed  5. Avoid eating large meals 2-3 hours before bedtime  6. Antacids as needed

## 2022-05-12 ENCOUNTER — PATIENT MESSAGE (OUTPATIENT)
Dept: ADMINISTRATIVE | Facility: HOSPITAL | Age: 62
End: 2022-05-12
Payer: MEDICARE

## 2022-05-12 DIAGNOSIS — Z12.11 SCREENING FOR COLON CANCER: ICD-10-CM

## 2022-06-02 ENCOUNTER — HOSPITAL ENCOUNTER (OUTPATIENT)
Dept: RADIOLOGY | Facility: HOSPITAL | Age: 62
Discharge: HOME | End: 2022-06-02
Attending: NURSE PRACTITIONER
Payer: MEDICARE

## 2022-06-02 ENCOUNTER — PATIENT MESSAGE (OUTPATIENT)
Dept: INTERNAL MEDICINE | Facility: CLINIC | Age: 62
End: 2022-06-02
Payer: MEDICARE

## 2022-06-02 DIAGNOSIS — E04.2 MULTINODULAR THYROID: ICD-10-CM

## 2022-06-02 DIAGNOSIS — E04.2 MULTINODULAR THYROID: Primary | ICD-10-CM

## 2022-06-02 PROCEDURE — 76536 US EXAM OF HEAD AND NECK: CPT | Mod: TC

## 2022-06-03 NOTE — TELEPHONE ENCOUNTER
Please inform patient that I am referring her to ENT for thyroid nodules seen on US. The largest left thyroid nodule did increase in size from previous exam.

## 2022-06-08 ENCOUNTER — DOCUMENTATION ONLY (OUTPATIENT)
Dept: CARDIOLOGY | Facility: CLINIC | Age: 62
End: 2022-06-08
Payer: MEDICARE

## 2022-06-08 ENCOUNTER — OFFICE VISIT (OUTPATIENT)
Dept: OTOLARYNGOLOGY | Facility: CLINIC | Age: 62
End: 2022-06-08
Payer: MEDICARE

## 2022-06-08 VITALS
HEART RATE: 61 BPM | HEIGHT: 69 IN | TEMPERATURE: 99 F | BODY MASS INDEX: 43.25 KG/M2 | DIASTOLIC BLOOD PRESSURE: 78 MMHG | SYSTOLIC BLOOD PRESSURE: 123 MMHG | WEIGHT: 292 LBS

## 2022-06-08 DIAGNOSIS — E04.2 MULTINODULAR THYROID: ICD-10-CM

## 2022-06-08 DIAGNOSIS — F17.200 SMOKER: Primary | ICD-10-CM

## 2022-06-08 PROCEDURE — 3008F BODY MASS INDEX DOCD: CPT | Mod: CPTII,,, | Performed by: NURSE PRACTITIONER

## 2022-06-08 PROCEDURE — 99214 OFFICE O/P EST MOD 30 MIN: CPT | Mod: PBBFAC | Performed by: NURSE PRACTITIONER

## 2022-06-08 PROCEDURE — 3074F SYST BP LT 130 MM HG: CPT | Mod: CPTII,,, | Performed by: NURSE PRACTITIONER

## 2022-06-08 PROCEDURE — 3060F PR POS MICROALBUMINURIA RESULT DOCUMENTED/REVIEW: ICD-10-PCS | Mod: CPTII,,, | Performed by: NURSE PRACTITIONER

## 2022-06-08 PROCEDURE — 3066F NEPHROPATHY DOC TX: CPT | Mod: CPTII,,, | Performed by: NURSE PRACTITIONER

## 2022-06-08 PROCEDURE — 1160F PR REVIEW ALL MEDS BY PRESCRIBER/CLIN PHARMACIST DOCUMENTED: ICD-10-PCS | Mod: CPTII,,, | Performed by: NURSE PRACTITIONER

## 2022-06-08 PROCEDURE — 3060F POS MICROALBUMINURIA REV: CPT | Mod: CPTII,,, | Performed by: NURSE PRACTITIONER

## 2022-06-08 PROCEDURE — 1159F MED LIST DOCD IN RCRD: CPT | Mod: CPTII,,, | Performed by: NURSE PRACTITIONER

## 2022-06-08 PROCEDURE — 3066F PR DOCUMENTATION OF TREATMENT FOR NEPHROPATHY: ICD-10-PCS | Mod: CPTII,,, | Performed by: NURSE PRACTITIONER

## 2022-06-08 PROCEDURE — 3078F DIAST BP <80 MM HG: CPT | Mod: CPTII,,, | Performed by: NURSE PRACTITIONER

## 2022-06-08 PROCEDURE — 3074F PR MOST RECENT SYSTOLIC BLOOD PRESSURE < 130 MM HG: ICD-10-PCS | Mod: CPTII,,, | Performed by: NURSE PRACTITIONER

## 2022-06-08 PROCEDURE — 3078F PR MOST RECENT DIASTOLIC BLOOD PRESSURE < 80 MM HG: ICD-10-PCS | Mod: CPTII,,, | Performed by: NURSE PRACTITIONER

## 2022-06-08 PROCEDURE — 1159F PR MEDICATION LIST DOCUMENTED IN MEDICAL RECORD: ICD-10-PCS | Mod: CPTII,,, | Performed by: NURSE PRACTITIONER

## 2022-06-08 PROCEDURE — 99213 OFFICE O/P EST LOW 20 MIN: CPT | Mod: S$PBB,,, | Performed by: NURSE PRACTITIONER

## 2022-06-08 PROCEDURE — 3008F PR BODY MASS INDEX (BMI) DOCUMENTED: ICD-10-PCS | Mod: CPTII,,, | Performed by: NURSE PRACTITIONER

## 2022-06-08 PROCEDURE — 99213 PR OFFICE/OUTPT VISIT, EST, LEVL III, 20-29 MIN: ICD-10-PCS | Mod: S$PBB,,, | Performed by: NURSE PRACTITIONER

## 2022-06-08 PROCEDURE — 1160F RVW MEDS BY RX/DR IN RCRD: CPT | Mod: CPTII,,, | Performed by: NURSE PRACTITIONER

## 2022-06-08 RX ORDER — BUPROPION HYDROCHLORIDE 150 MG/1
150 TABLET ORAL EVERY 12 HOURS
Qty: 60 TABLET | Refills: 2 | Status: SHIPPED | OUTPATIENT
Start: 2022-06-08 | End: 2022-06-08

## 2022-06-08 RX ORDER — BUPROPION HYDROCHLORIDE 150 MG/1
TABLET ORAL
Qty: 60 TABLET | Refills: 2 | Status: SHIPPED | OUTPATIENT
Start: 2022-06-08 | End: 2022-07-14 | Stop reason: SDUPTHER

## 2022-06-08 NOTE — PROGRESS NOTES
Okay to hold Aspirin for 5 days prior to FNA procedure, but should resume when safe from a surgical standpoint.      Thanks.

## 2022-06-08 NOTE — PROGRESS NOTES
"[facility]  Otolaryngology Clinic Note    Fernanda Singh  Encounter Date: 6/8/2022  YOB: 1960    Chief Complaint: thyroid nodule    HPI: 55 yo female referred from cardiology clinic for MNG. Patient had been referred to endocrine but has not yet been able to get in. Patient has had a history of a MNG for many years. Denies any swallowing issues, no issues with voice, denies pain or any compression symptoms. Had been told she had abnormal thyroid labs in 2014 but does not remember what and has never been put on any medications for it. Smoker.    9/24/19: We referred for her left thyroid nodule. Ultrasound from outside hospital showing a 1.6 x 1.5 x 1.2 cm left thyroid nodule with "possible calcifications ". Patient denies any symptoms of hyper or hypothyroidism. She does have her labs checked frequently reports that she has been told are normal. She does report a globus sensation related to her thyroid. She is on antireflux medication.    06/08/2022: Referred back for left thyroid nodule, now 2.1cm. She denies dysphagia, dysphonia, SOB, or type B symptoms. Denies family hx of thyroid cancer or personal radiation exposure. States that she did have a benign bx of nodule in [place] ~2017. She is a smoker of 1ppd x 40 years. Has tried chantix in the past but it gave her visual hallucinations. She follows with the cardiology clinic for a remote hx of CHF and takes baby asa daily.     ROS:   General: Negative except per HPI  Skin: Denies rash, ulcer, or lesion.  Eyes: Denies vision changes or diplopia.  Ears: Negative except per HPI  Nose: Negative except per HPI  Throat/mouth: Negative except per HPI  Cardiovascular: Negative except per HPI  Respiratory: Negative except per HPI  Neck: Negative except per HPI  Endocrine: Negative except per HPI  Neurologic: Negative except per HPI    Other 10-point review of systems negative except per HPI      Review of patient's allergies " indicates:   Allergen Reactions    Lisinopril Swelling     Other reaction(s): Angioedema of lips       Past Medical History:   Diagnosis Date    Cancer 2018    CHF (congestive heart failure) 2012    Diabetes mellitus 2019    Disorder of kidney and ureter 2013    GERD (gastroesophageal reflux disease) 2013    Hypertension 2011    Sleep apnea 2013       Past Surgical History:   Procedure Laterality Date    BILATERAL TUBAL LIGATION      Biopsy Gastrointestinal  11/13/2019    BIOPSY OF THYROID  07/2019    Catheter Inserrtion [facility]      [id number]    CERVICAL CONIZATION   W/ LASER  05/01/2018    COLONOSCOPY  05/10/2019    COLONOSCOPY  09/24/2014    DILATION AND CURETTAGE OF UTERUS  05/01/2018    ESOPHAGOGASTRODUODENOSCOPY  09/24/2014    ESOPHAGOGASTRODUODENOSCOPY  11/13/2019    Left breast small mass removal      Myomectomy Hysteroscopic  05/01/2018    POLYPECTOMY  05/10/2019    POLYPECTOMY  09/24/2014       Social History     Socioeconomic History    Marital status:      Spouse name: Jude    Number of children: 3    Highest education level: Associate degree: occupational, technical, or vocational program   Tobacco Use    Smoking status: Current Every Day Smoker     Packs/day: 1.00     Years: 15.00     Pack years: 15.00     Types: Cigarettes    Smokeless tobacco: Never Used   Substance and Sexual Activity    Alcohol use: Not Currently    Drug use: Not Currently    Sexual activity: Yes     Partners: Male       Family History   Problem Relation Age of Onset    Hypertension Mother     Heart failure Mother     Kidney failure Mother     Heart attack Father     Hypertension Father     Diabetes Father     Leukemia Brother     Cirrhosis Brother     Hypertension Brother     Esophageal cancer Brother        Outpatient Encounter Medications as of 6/8/2022   Medication Sig Dispense Refill    amLODIPine (NORVASC) 10 MG tablet Take 10 mg by mouth.      aspirin (ECOTRIN) 81 MG EC tablet  "Take 81 mg by mouth.      baclofen (LIORESAL) 10 MG tablet Take 1 tablet (10 mg total) by mouth 3 (three) times daily. for 10 days 30 tablet 1    carvediloL (COREG) 25 MG tablet Take 25 mg by mouth.      furosemide (LASIX) 40 MG tablet Take 40 mg by mouth.      gabapentin (NEURONTIN) 600 MG tablet Take 1 tablet (600 mg total) by mouth 3 (three) times daily. 270 tablet 1    glipiZIDE (GLUCOTROL) 10 MG tablet Take 1 tablet (10 mg total) by mouth daily with breakfast. 90 tablet 1    hydrALAZINE (APRESOLINE) 50 MG tablet Take 50 mg by mouth 2 (two) times daily.      rosuvastatin (CRESTOR) 10 MG tablet Take 10 mg by mouth.       No facility-administered encounter medications on file as of 6/8/2022.       Physical Exam:  Vitals:    06/08/22 1202   BP: 123/78   BP Location: Right arm   Patient Position: Sitting   Pulse: 61   Temp: 98.6 °F (37 °C)   TempSrc: Oral   Weight: 132.5 kg (292 lb)   Height: 5' 8.5" (1.74 m)       General: NAD, voice normal  Neuro: AAO, CN II - XII grossly intact  Head/ Face: NCAT, symmetric, sensations intact bilaterally  Eyes: EOMI, PERRL  Ears: externally normal with grossly normal hearing  AD: EAC patent, TM intact, no middle ear effusion, no retractions  AS: EAC patent, TM intact, no middle ear effusion, no retractions  Nose: bilateral nares patent, midline septum, no rhinorrhea, no external deformity, no turbinate hypertrophy  OC/OP: MMM, no intraoral lesions, FOM/BOT soft, no trismus, dentition is moderate, no uvular deviation, bilaterally symmetric soft palate elevation, palatoglossus and palatopharyngeal fold wnl; tonsils are symmetric and 1+  Indirect laryngoscopy: deferred due to patient intolerance  Neck: soft, supple, no LAD, normal ROM, no thyromegaly  Respiratory: nonlabored, no wheezing, bilateral chest rise  Cardiovascular: RRR  Gastrointestinal: S NT ND  Skin: warm, no lesions  Musculoskeletal: 5/5 strength  Psych: Appropriate affect/mood     Pertinent Data:  ? LABS: TSH " 4/2022 0.7531  ? AUDIO:            ? CT Scan:    Imaging:   I personally reviewed the following images:  Narrative & Impression  EXAMINATION:  US SOFT TISSUE HEAD NECK THYROID     CLINICAL HISTORY:  thyroid nodules; Nontoxic multinodular goiter     TECHNIQUE:  Ultrasound of the thyroid and cervical lymph nodes was performed.     COMPARISON:  April 29, 2021     FINDINGS:  Grayscale imaging color flow Doppler images are available for interpretation.     Examination reveals right hemithyroid to measured 5 cm x 1.3 x 2.3 cm, left broderick thyroid measures 5.6 x 1.7 x 2.8 cm isthmus measures 4.4 mm in thickness.     On the right subcentimeter nodules are identified the largest measures approximately 6 mm x 5 mm x 5 mm.     On the left some subcentimeter nodules are identified there is a dominant nodule that measures 2 cm x 1.7 x 2.1 cm these represents a TI-RADS 4 type lesion and biopsy recommended if greater than 1.5 cm other subcentimeter nodules are identified this lesion measured on the previous exam 1.9 cm x 1.5 x 1.8 cm     Impression:     Multiple subcentimeter nodules in the right and left hemithyroid that do not meet criteria for biopsy.     Index nodule in the left with measurements as above biopsy is recommended by radiology criteria        Electronically signed by: Jonas Davila  Date:                                            06/02/2022  Time:                                           14:28      Assessment/Plan:  61 y.o. female with multinodular goiter, 2.1cm left thyroid nodule. There has been moderate growth over consecutive u/s beginning in 2019.  - Referral to IR for FNA   - Will obtain clearance to hold asa x 5 days from cardiology  - Smoking cessation strongly encouraged. Will trial wellbutrin   - RTC 4-6 weeks to review path       Merissa Garcia NP

## 2022-06-10 ENCOUNTER — TELEPHONE (OUTPATIENT)
Dept: INTERVENTIONAL RADIOLOGY/VASCULAR | Facility: HOSPITAL | Age: 62
End: 2022-06-10

## 2022-06-15 NOTE — PROGRESS NOTES
Fernanda Singh is a 61 y.o. female here for a diabetic eye screening with non-dilated fundus photos per EDGAR Bundy NP.    Patient cooperative?: Yes  Small pupils?: No  Last eye exam: unknown    For exam results, see Encounter Report.

## 2022-06-16 ENCOUNTER — HOSPITAL ENCOUNTER (OUTPATIENT)
Dept: INTERVENTIONAL RADIOLOGY/VASCULAR | Facility: HOSPITAL | Age: 62
Discharge: HOME | End: 2022-06-16
Attending: NURSE PRACTITIONER
Payer: MEDICARE

## 2022-06-16 DIAGNOSIS — E04.1 THYROID NODULE: ICD-10-CM

## 2022-06-16 PROCEDURE — 10005 FNA BX W/US GDN 1ST LES: CPT

## 2022-06-16 RX ORDER — LIDOCAINE HYDROCHLORIDE 20 MG/ML
1 INJECTION, SOLUTION INFILTRATION; PERINEURAL ONCE
Status: DISCONTINUED | OUTPATIENT
Start: 2022-06-16 | End: 2022-06-17 | Stop reason: HOSPADM

## 2022-06-17 LAB
ESTROGEN SERPL-MCNC: NORMAL PG/ML
INSULIN SERPL-ACNC: NORMAL U[IU]/ML
LAB AP CLINICAL INFORMATION: NORMAL
LAB AP EBUS/EUS SPECIMEN: NORMAL
LAB AP GROSS DESCRIPTION: NORMAL

## 2022-06-20 DIAGNOSIS — Z12.11 SCREENING FOR COLON CANCER: Primary | ICD-10-CM

## 2022-06-20 LAB — HEMOCCULT STL QL IA: NORMAL

## 2022-07-07 ENCOUNTER — OFFICE VISIT (OUTPATIENT)
Dept: OTOLARYNGOLOGY | Facility: CLINIC | Age: 62
End: 2022-07-07
Payer: MEDICARE

## 2022-07-07 VITALS
TEMPERATURE: 99 F | WEIGHT: 289 LBS | DIASTOLIC BLOOD PRESSURE: 72 MMHG | SYSTOLIC BLOOD PRESSURE: 114 MMHG | BODY MASS INDEX: 43.3 KG/M2 | HEART RATE: 62 BPM

## 2022-07-07 DIAGNOSIS — E04.1 THYROID NODULE: Primary | ICD-10-CM

## 2022-07-07 PROCEDURE — 3074F PR MOST RECENT SYSTOLIC BLOOD PRESSURE < 130 MM HG: ICD-10-PCS | Mod: CPTII,,, | Performed by: NURSE PRACTITIONER

## 2022-07-07 PROCEDURE — 3078F PR MOST RECENT DIASTOLIC BLOOD PRESSURE < 80 MM HG: ICD-10-PCS | Mod: CPTII,,, | Performed by: NURSE PRACTITIONER

## 2022-07-07 PROCEDURE — 3074F SYST BP LT 130 MM HG: CPT | Mod: CPTII,,, | Performed by: NURSE PRACTITIONER

## 2022-07-07 PROCEDURE — 3008F PR BODY MASS INDEX (BMI) DOCUMENTED: ICD-10-PCS | Mod: CPTII,,, | Performed by: NURSE PRACTITIONER

## 2022-07-07 PROCEDURE — 3008F BODY MASS INDEX DOCD: CPT | Mod: CPTII,,, | Performed by: NURSE PRACTITIONER

## 2022-07-07 PROCEDURE — 3060F POS MICROALBUMINURIA REV: CPT | Mod: CPTII,,, | Performed by: NURSE PRACTITIONER

## 2022-07-07 PROCEDURE — 99213 PR OFFICE/OUTPT VISIT, EST, LEVL III, 20-29 MIN: ICD-10-PCS | Mod: S$PBB,,, | Performed by: NURSE PRACTITIONER

## 2022-07-07 PROCEDURE — 3066F NEPHROPATHY DOC TX: CPT | Mod: CPTII,,, | Performed by: NURSE PRACTITIONER

## 2022-07-07 PROCEDURE — 99213 OFFICE O/P EST LOW 20 MIN: CPT | Mod: PBBFAC | Performed by: NURSE PRACTITIONER

## 2022-07-07 PROCEDURE — 3066F PR DOCUMENTATION OF TREATMENT FOR NEPHROPATHY: ICD-10-PCS | Mod: CPTII,,, | Performed by: NURSE PRACTITIONER

## 2022-07-07 PROCEDURE — 3078F DIAST BP <80 MM HG: CPT | Mod: CPTII,,, | Performed by: NURSE PRACTITIONER

## 2022-07-07 PROCEDURE — 99213 OFFICE O/P EST LOW 20 MIN: CPT | Mod: S$PBB,,, | Performed by: NURSE PRACTITIONER

## 2022-07-07 PROCEDURE — 1159F PR MEDICATION LIST DOCUMENTED IN MEDICAL RECORD: ICD-10-PCS | Mod: CPTII,,, | Performed by: NURSE PRACTITIONER

## 2022-07-07 PROCEDURE — 3060F PR POS MICROALBUMINURIA RESULT DOCUMENTED/REVIEW: ICD-10-PCS | Mod: CPTII,,, | Performed by: NURSE PRACTITIONER

## 2022-07-07 PROCEDURE — 1159F MED LIST DOCD IN RCRD: CPT | Mod: CPTII,,, | Performed by: NURSE PRACTITIONER

## 2022-07-07 NOTE — PROGRESS NOTES
"[facility]  Otolaryngology Clinic Note    Fernanda Singh  Encounter Date: 7/7/2022  YOB: 1960    Chief Complaint: f/u  After FNA    HPI:  55 yo female referred from cardiology clinic for MNG. Patient had been referred to endocrine but has not yet been able to get in. Patient has had a history of a MNG for many years. Denies any swallowing issues, no issues with voice, denies pain or any compression symptoms. Had been told she had abnormal thyroid labs in 2014 but does not remember what and has never been put on any medications for it. Smoker.    9/24/19: We referred for her left thyroid nodule. Ultrasound from outside hospital showing a 1.6 x 1.5 x 1.2 cm left thyroid nodule with "possible calcifications ". Patient denies any symptoms of hyper or hypothyroidism. She does have her labs checked frequently reports that she has been told are normal. She does report a globus sensation related to her thyroid. She is on antireflux medication.     06/08/2022: Referred back for left thyroid nodule, now 2.1cm. She denies dysphagia, dysphonia, SOB, or type B symptoms. Denies family hx of thyroid cancer or personal radiation exposure. States that she did have a benign bx of nodule in [place] ~2017. She is a smoker of 1ppd x 40 years. Has tried chantix in the past but it gave her visual hallucinations. She follows with the cardiology clinic for a remote hx of CHF and takes baby asa daily.     07/07/2022: Doing well. No c/o.    ROS:   10-point review of systems negative except per HPI      Review of patient's allergies indicates:   Allergen Reactions    Lisinopril Swelling     Other reaction(s): Angioedema of lips       Past Medical History:   Diagnosis Date    Cancer 2018    CHF (congestive heart failure) 2012    Diabetes mellitus 2019    Disorder of kidney and ureter 2013    GERD (gastroesophageal reflux disease) 2013    Hypertension 2011    Sleep apnea 2013       Past " Surgical History:   Procedure Laterality Date    BILATERAL TUBAL LIGATION      Biopsy Gastrointestinal  11/13/2019    BIOPSY OF THYROID  07/2019    [id number]    CERVICAL CONIZATION   W/ LASER  05/01/2018    COLONOSCOPY  05/10/2019    COLONOSCOPY  09/24/2014    DILATION AND CURETTAGE OF UTERUS  05/01/2018    ESOPHAGOGASTRODUODENOSCOPY  09/24/2014    ESOPHAGOGASTRODUODENOSCOPY  11/13/2019    Left breast small mass removal      Myomectomy Hysteroscopic  05/01/2018    POLYPECTOMY  05/10/2019    POLYPECTOMY  09/24/2014       Social History     Socioeconomic History    Marital status:      Spouse name: Jude    Number of children: 3    Highest education level: Associate degree: occupational, technical, or vocational program   Tobacco Use    Smoking status: Current Every Day Smoker     Packs/day: 1.00     Years: 15.00     Pack years: 15.00     Types: Cigarettes    Smokeless tobacco: Never Used   Substance and Sexual Activity    Alcohol use: Not Currently    Drug use: Not Currently    Sexual activity: Yes     Partners: Male       Family History   Problem Relation Age of Onset    Hypertension Mother     Heart failure Mother     Kidney failure Mother     Heart attack Father     Hypertension Father     Diabetes Father     Leukemia Brother     Cirrhosis Brother     Hypertension Brother     Esophageal cancer Brother        Outpatient Encounter Medications as of 7/7/2022   Medication Sig Dispense Refill    amLODIPine (NORVASC) 10 MG tablet Take 10 mg by mouth.      aspirin (ECOTRIN) 81 MG EC tablet Take 81 mg by mouth.      baclofen (LIORESAL) 10 MG tablet Take 1 tablet (10 mg total) by mouth 3 (three) times daily. for 10 days 30 tablet 1    buPROPion (WELLBUTRIN XL) 150 MG TB24 tablet TAKE 1 TABLET BY MOUTH DAILY FOR 7 DAYS, THEN INCREASE TO 1 TABLET BY MOUTH EVERY 12 HOURS AS TOLERATED 60 tablet 2    carvediloL (COREG) 25 MG tablet Take 25 mg by mouth.       furosemide (LASIX) 40 MG tablet Take 40 mg by mouth.      gabapentin (NEURONTIN) 600 MG tablet Take 1 tablet (600 mg total) by mouth 3 (three) times daily. 270 tablet 1    glipiZIDE (GLUCOTROL) 10 MG tablet Take 1 tablet (10 mg total) by mouth daily with breakfast. 90 tablet 1    hydrALAZINE (APRESOLINE) 50 MG tablet Take 50 mg by mouth 2 (two) times daily.      rosuvastatin (CRESTOR) 10 MG tablet Take 10 mg by mouth.       No facility-administered encounter medications on file as of 7/7/2022.       Physical Exam:  Vitals:    07/07/22 1026   BP: 114/72   Pulse: 62   Temp: 98.6 °F (37 °C)   Weight: 131.1 kg (289 lb)     General: NAD, voice normal  Neuro: AAO, CN II - XII grossly intact  Head/ Face: NCAT, symmetric, sensations intact bilaterally  Eyes: EOMI, PERRL  Ears: externally normal with grossly normal hearing  AD: EAC patent, TM intact, no middle ear effusion, no retractions  AS: EAC patent, TM intact, no middle ear effusion, no retractions  Nose: bilateral nares patent, midline septum, no rhinorrhea, no external deformity, no turbinate hypertrophy  OC/OP: MMM, no intraoral lesions, no trismus, dentition is moderate, no uvular deviation, bilaterally symmetric soft palate elevation, palatoglossus and palatopharyngeal fold wnl; tonsils are symmetric and 1+, Mallampati IV  Indirect laryngoscopy: deferred due to patient intolerance  Neck: soft, supple, no LAD, normal ROM, prominent b/l thyroid tissue without discrete nodularity  Respiratory: nonlabored, no wheezing, bilateral chest rise  Cardiovascular: RRR  Gastrointestinal: S NT ND  Skin: warm, no lesions  Musculoskeletal: 5/5 strength  Psych: Appropriate affect/mood     Pertinent Data:  ? LABS:  ? AUDIO:           ? PATH:        Imaging:   I personally reviewed the following images:        Assessment/Plan:  61 y.o. female with multinodular goiter, 2.1cm left thyroid nodule. There has been moderate growth over consecutive u/s beginning in 2019. S/p FNA  6/16, path benign- reviewed with pt.  - F/u with PCP  - RTC prn     Merissa Garcia NP

## 2022-07-13 PROBLEM — D25.9 UTERINE FIBROID: Status: ACTIVE | Noted: 2022-07-13

## 2022-07-13 PROBLEM — Z72.0 TOBACCO USE: Status: ACTIVE | Noted: 2019-01-10

## 2022-07-13 PROBLEM — C54.1 MALIGNANT NEOPLASM OF ENDOMETRIUM: Status: ACTIVE | Noted: 2022-07-13

## 2022-07-13 PROBLEM — Z00.00 WELLNESS EXAMINATION: Status: ACTIVE | Noted: 2022-07-13

## 2022-07-13 RX ORDER — PROMETHAZINE HYDROCHLORIDE 12.5 MG/1
12.5 TABLET ORAL EVERY 6 HOURS PRN
COMMUNITY
Start: 2022-03-08 | End: 2022-09-26

## 2022-07-13 RX ORDER — DICYCLOMINE HYDROCHLORIDE 20 MG/1
20 TABLET ORAL DAILY
COMMUNITY
Start: 2022-03-08 | End: 2022-09-26

## 2022-07-13 NOTE — PROGRESS NOTES
Face Laceration: Suture or Tape  A laceration is a cut through the skin. This will require stitches if it is deep. Minor cuts may be treated with surgical tape.    Home care  · Your healthcare provider may prescribe an antibiotic. This is to help prevent infection. Follow all instructions for taking this medicine. Take the medicine every day until it is gone or you are told to stop. You should not have any left over.  · The healthcare provider may prescribe medicines for pain. Follow instructions for taking them.  · Follow the healthcare provider’s instructions on how to care for the cut.  · Wash your hands with soap and warm water before and after caring for the cut. This helps prevent infection.  · If a bandage was applied and it becomes wet or dirty, replace it. Otherwise, leave it in place for the first 24 hours, then change it once a day or as directed.  · If sutures were used, clean the wound daily:  ¨ After removing the bandage, wash the area with soap and water. Use a wet cotton swab to loosen and remove any blood or crust that forms.  ¨ After cleaning, keep the wound clean and dry. Talk with your doctor before applying any antibiotic ointment to the wound. Reapply a fresh bandage.  ¨ You may remove the bandage to shower as usual after the first 24 hours, but do not soak the area in water (no swimming) until the sutures are removed.  · If surgical tape was used, keep the area clean and dry. If it becomes wet, blot it dry with a towel.  · Most facial skin wounds heal without problems. However, an infection sometimes occurs despite proper treatment. Therefore, watch for the signs of infection listed below.  Follow-up care  Follow up with your healthcare provider as advised. Be sure to return for suture removal as directed. Ask your provider how long sutures should remain in place. If surgical tape closures were used, you may remove them yourself when your provider recommends if they have not fallen off on    MARICHUY Leonardo   [facility]  [address] [id number]      PATIENT NAME: Fernanda Singh  : 1960  DATE: 22  MRN: [id number]      Billing Provider: MARICHUY Leonardo  Level of Service:   Patient PCP Information       Provider PCP Type    MARICHUY Leonardo General            Reason for Visit / Chief Complaint: Follow-up (BP f/U)       History of Present Illness / Problem Focused Workflow     Fernanda Singh presents to the clinic with Follow-up (BP f/U)     Previous PCP: Rachel Goff 2020   PmHx: mild CAD, hypertension, GERD, CKD, chronic tobacco use, obesity, breast mass (), uterine CA (2017), H.Pylori (), colon polyps  SHx: left breast lumpectomy (), thyroid bx (2019), tubal, total hysterectomy (), EGD , colonoscopy 2019--hyperplastic polyp   FHx: Esophageal cancer (brother), cirrhosis (brother), Renal failure, heart dz, HF, HTN, HLD   Complaints today: Establish care     Ms. Michael is a pleasant 61 yo female presenting to re-establish primary care. Has not followed up with a PCP since . [facility] previous PCP moved. She is following [facility] here for CAD and CHF (per report). Also following Heme/Onc and GYN clinics for h/o uterine ca diagnosed in . She had a left breast mass removed in . [facility] path was benign. She is UTD on colon cancer screening. H/o colon polyp. Recommended repeat colonoscopy 2024. She has a h/o diabetes diagnosed ~1-2 years ago. Was given Glipizide by last PCP before she moved. No recent A1c on file. H/o goiter with thyroid nodules. [facility] had a thyroid bx 2019 that was negative per report. She needs a refill on Glipizide. No other problems stated.    Health Maintenance:   Colon Ca Screening-colonoscopy 2019--hyperplastic polyp  Breast Ca Screening-Mammo 2020, benign   Bone Density 2020 WNL    (21): Pt presenting to review  "labs. Since last visit, she was seen in [facility] 4/14/21. Underwent fundus photo which was negative for DR.    Lab review:      A1c 6.8   LDL 48   TSH WNL   RBCs on UA. Noted intermittently since 2019.     Thyroid US 4/29/21 revealed a multinodular thyroid with at least one thyroid nodule on left side (1.9 cm) meeting the criteria for FNA.     Pt c/o right hand pain and numbness to first three fingers. Concerned about CTS. States remodeling home and some of the tools have been difficult to use. C/o stiffness and inability to close hand into a fist on occasion upon awakening.   Denies fever, chills, cough, chest pain, SOB, abd pain, dysuria, gross hematuria, leg pain, or any other concerns.      (10/15/21): Pt presenting for routine f/u.   Lab review:   HgA1c 5.7%      Renal indices stable   FLP WNL   Hgb 11.9-stable and improved   RBCs on UA 6-10   She underwent CT A/P in June 2/2 MSH. No overt path on CT other than renal cyst of the lower right kidney. Urine cytology negative. States GYN explained that RBCS in UA likely due to vaginal irritation/inflammation 2/2 radiation tx. No gross hematuria.     C/o lower back pain x 1 mth. Long-standing hx of back pain but worse over past month. No overt injuries/accidents or anything "out of the norm." States pain worse at night, occasionally awakening her from her sleep. Exacerbated by standing > 10 min or strenuous activity over the course of the day. Denies falls, lower ext weakness, B/B incontinence. Relieved with rest and Naprosyn PRN--takes at night. States years since last imaging.     Bp slightly elevated. Asymptomatic. Taking medications as prescribed. F/u with [facility] today.    (5/11/22): Pt presenting for routine f/u. She had labs completed in 4/2022 but missed a f/u to review. She was referred to ENT last April for a multinodular thyroid with one meeting criteria for FNA but didn't make appt. Also states she needs to call to re-schedule appts in GYN, " their own.  When to seek medical advice  Call your healthcare provider right away if any of these occur:  · Wound bleeding not controlled by direct pressure  · Signs of infection, including increasing pain in the wound, increasing wound redness or swelling, or pus or bad odor coming from the wound  · Fever of 100.4°F (38ºC) or higher or as directed by your healthcare provider  · Stitches come apart or fall out or surgical tape falls off before 5 days  · Wound edges re-open  · Wound changes colors  · Numbness around the wound   © 0669-6144 [employer] [address] [place] [address]. All rights reserved. This information is not intended as a substitute for professional medical care. Always follow your healthcare professional's instructions.        Head Injury (Adult)    You have a head injury. It does not appear serious at this time. But symptoms of a more serious problem, such as a mild brain injury (concussion) or bruising or bleeding in the brain, may appear later. For this reason, you or someone caring for you will need to watch for the symptoms listed below. Once you’re home, also be sure to follow any care instructions you’re given.  Home care  Watch for the following symptoms  Seek emergency medical care if you have any of these symptoms over the next hours to days:   · Headache  · Nausea or vomiting  · Dizziness  · Sensitivity to light or noise  · Unusual sleepiness or grogginess  · Trouble falling asleep  · Personality changes  · Vision changes  · Memory loss  · Confusion  · Trouble walking or clumsiness  · Loss of consciousness (even for a short time)  · Inability to be awakened  · Stiff neck  · Weakness or numbness in any part of the body  · Seizures  General care  · If you were prescribed medicines for pain, use them as directed. Note: Don’t take other medicines for pain without talking to your provider first.  · To help reduce swelling and pain, apply a cold source to the injured  "oncologic GYN in [facility], and [facility] clinic. She had a negative screening mammogram (BI-RAD 2) 1/5/2022. Today, she continues with lower back pain as mentioned at last visit. She was referred to P.T. but ultimately declined appt at the time. Long-standing hx of back pain but worse over past month. No overt injuries/accidents or anything "out of the norm." States pain worse at night, occasionally awakening her from her sleep. Exacerbated by standing > 10 min or strenuous activity over the course of the day. Denies falls, lower ext weakness, B/B incontinence. Relieved with rest and Naprosyn PRN--takes at night. She's also used Flexeril in the past with some relief.    Today's Visit (7/14/22): Pt presenting for routine f/u. Bp elevated at last visit; at goal today. Taking medications as prescribed. Previously referred to PT for chronic lower back pain ongoing at least 10 years. Now following [facility] in [place], [place]. Initial eval 5/31/22. Plans were for PT 3x/week x 6 weeks (with certification period ending 7/11/22). Pt states she has only been able to afford PT once a week. States "somewhat" helpful. No saddle anesthesia, B/B incontinence, or falls. Requesting refill on Baclofen. She's seen GYN and ENT since last appt. Stable and doing well. No other concerns.        Review of Systems     Review of Systems   Musculoskeletal: Positive for back pain.   All other systems reviewed and are negative.      Medical / Social / Family History     Past Medical History:   Diagnosis Date    Cancer 2018    CHF (congestive heart failure) 2012    Diabetes mellitus 2019    Disorder of kidney and ureter 2013    GERD (gastroesophageal reflux disease) 2013    Hypertension 2011    Sleep apnea 2013       Past Surgical History:   Procedure Laterality Date    ANGIOGRAM, CORONARY, WITH LEFT HEART CATHETERIZATION N/A 9/28/2022    Procedure: Angiogram, Coronary, with Left Heart Cath;  Surgeon: Milton Connolly MD;  Location: [facility] CATH LAB;  " area for up to 20 minutes at a time. Do this as often as directed. Use a cold pack or bag of ice wrapped in a thin towel. Never apply a cold source directly to the skin.  · If you have cuts or scrapes as a result of your head injury, care for them as directed.  · For the next 24 hours (or longer, if instructed):  ¨ Don’t drink alcohol or use sedatives or other medicines that make you sleepy.  ¨ Don’t drive or operate machinery.  ¨ Don’t do anything strenuous, such as heavy lifting or straining.  ¨ Limit tasks that require concentration. This includes reading, using a smartphone or computer, watching TV, and playing video games.  ¨ Don’t return to sports or other activities that could result in another head injury.  Follow-up care  Follow up with your healthcare provider, or as directed. If imaging tests were done, they will be reviewed by a doctor. You will be told the results and any new findings that may affect your care.  When to seek medical advice  Call your healthcare provider right away if any of these occur:  · Pain doesn’t get better or worsens  · New or increased swelling or bruising  · Fever of 100.4°F (38°C) or higher, or as directed by your provider  · Increased redness, warmth, drainage, or bleeding from the injured area  · Fluid drainage or bleeding from the nose or ears  · Any depression or bony abnormality in the injured area  © 5251-4590 [employer]. [address] [place] [address], [place] [address]. All rights reserved. This information is not intended as a substitute for professional medical care. Always follow your healthcare professional's instructions.         Service: Cardiology;  Laterality: N/A;    BILATERAL TUBAL LIGATION      Biopsy Gastrointestinal  11/13/2019    BIOPSY OF THYROID  07/2019    Catheter Inserrtion [facility]      [id number]    CERVICAL CONIZATION   W/ LASER  05/01/2018    COLONOSCOPY  05/10/2019    COLONOSCOPY  09/24/2014    DILATION AND CURETTAGE OF UTERUS  05/01/2018    ESOPHAGOGASTRODUODENOSCOPY  09/24/2014    ESOPHAGOGASTRODUODENOSCOPY  11/13/2019    Left breast small mass removal      Myomectomy Hysteroscopic  05/01/2018    POLYPECTOMY  05/10/2019    POLYPECTOMY  09/24/2014    TUBAL LIGATION         Social History  Ms.  reports that she has been smoking cigarettes. She has been exposed to tobacco smoke. She has never used smokeless tobacco. She reports that she does not currently use alcohol after a past usage of about 1.0 standard drink per week. She reports that she does not currently use drugs.    Family History  Ms.'s family history includes Cirrhosis in her brother; Diabetes in her father; Esophageal cancer in her brother; Heart attack in her father; Heart failure in her mother; Hypertension in her brother, father, and mother; Kidney failure in her mother; Leukemia in her brother.    Medications and Allergies     Medications  Medication List with Changes/Refills   New Medications    LIDOCAINE (LIDODERM) 5 %    Place 1 patch onto the skin once daily. Remove & Discard patch within 12 hours or as directed by MD   Current Medications    AMLODIPINE (NORVASC) 10 MG TABLET    Take 10 mg by mouth.    ASPIRIN (ECOTRIN) 81 MG EC TABLET    Take 81 mg by mouth.    BUPROPION (WELLBUTRIN SR) 150 MG TBSR 12 HR TABLET    Take 150 mg by mouth 2 (two) times daily.    CARVEDILOL (COREG) 25 MG TABLET    Take 25 mg by mouth 2 (two) times a day.    DICYCLOMINE (BENTYL) 20 MG TABLET    Take 20 mg by mouth once daily at 6am.    FUROSEMIDE (LASIX) 40 MG TABLET    Take 40 mg by mouth once daily.    GABAPENTIN (NEURONTIN) 600 MG TABLET    Take 1 tablet (600 mg total) by  mouth 3 (three) times daily.    GLIPIZIDE (GLUCOTROL) 10 MG TABLET    Take 1 tablet (10 mg total) by mouth daily with breakfast.    HYDRALAZINE (APRESOLINE) 50 MG TABLET    Take 50 mg by mouth 2 (two) times daily.    PROMETHAZINE (PHENERGAN) 12.5 MG TAB    Take 12.5 mg by mouth every 6 (six) hours as needed.    ROSUVASTATIN (CRESTOR) 10 MG TABLET    Take 10 mg by mouth every evening.   Changed and/or Refilled Medications    Modified Medication Previous Medication    BACLOFEN (LIORESAL) 10 MG TABLET baclofen (LIORESAL) 10 MG tablet       Take 1 tablet (10 mg total) by mouth 3 (three) times daily as needed (muscle spasm).    Take 1 tablet (10 mg total) by mouth 3 (three) times daily. for 10 days   Discontinued Medications    BUPROPION (WELLBUTRIN XL) 150 MG TB24 TABLET    TAKE 1 TABLET BY MOUTH DAILY FOR 7 DAYS, THEN INCREASE TO 1 TABLET BY MOUTH EVERY 12 HOURS AS TOLERATED    BUPROPION (WELLBUTRIN XL) 150 MG TB24 TABLET    Take 150 mg by mouth 2 (two) times a day.       Allergies  Review of patient's allergies indicates:   Allergen Reactions    Lisinopril Swelling     Other reaction(s): Angioedema of lips       Physical Examination     Vitals:    07/14/22 1133   BP: 134/85   Pulse: (!) 57   Resp: 18   Temp: 97.9 °F (36.6 °C)     Physical Exam  Constitutional:       Appearance: Normal appearance.   HENT:      Head: Normocephalic and atraumatic.      Right Ear: Tympanic membrane, ear canal and external ear normal.      Left Ear: Tympanic membrane, ear canal and external ear normal.      Nose: Nose normal.      Mouth/Throat:      Mouth: Mucous membranes are moist.      Pharynx: Oropharynx is clear.   Eyes:      Extraocular Movements: Extraocular movements intact.      Conjunctiva/sclera: Conjunctivae normal.      Pupils: Pupils are equal, round, and reactive to light.   Cardiovascular:      Rate and Rhythm: Normal rate and regular rhythm.      Pulses: Normal pulses.      Heart sounds: Normal heart sounds.   Pulmonary:       Effort: Pulmonary effort is normal.      Breath sounds: Normal breath sounds.   Abdominal:      General: Bowel sounds are normal.      Palpations: Abdomen is soft.   Musculoskeletal:         General: Normal range of motion.      Cervical back: Normal range of motion and neck supple.      Lumbar back: Tenderness present.   Skin:     General: Skin is warm and dry.   Neurological:      General: No focal deficit present.      Mental Status: She is alert and oriented to person, place, and time.   Psychiatric:         Mood and Affect: Mood normal.         Behavior: Behavior normal.         Thought Content: Thought content normal.         Judgment: Judgment normal.           Results     Lab Results   Component Value Date    WBC 7.7 12/05/2022    RBC 4.61 12/05/2022    HGB 11.6 (L) 12/05/2022    HCT 37.6 12/05/2022    MCV 81.6 12/05/2022    MCH 25.2 (L) 12/05/2022    MCHC 30.9 (L) 12/05/2022    RDW 16.2 12/05/2022     12/05/2022    MPV 10.6 (H) 12/05/2022     CMP  Sodium Level   Date Value Ref Range Status   04/13/2023 141 136 - 145 mmol/L Final     Potassium Level   Date Value Ref Range Status   04/13/2023 4.1 3.5 - 5.1 mmol/L Final     Carbon Dioxide   Date Value Ref Range Status   04/13/2023 26 23 - 31 mmol/L Final     Blood Urea Nitrogen   Date Value Ref Range Status   04/13/2023 12.0 9.8 - 20.1 mg/dL Final     Creatinine   Date Value Ref Range Status   04/13/2023 0.93 0.55 - 1.02 mg/dL Final     Calcium Level Total   Date Value Ref Range Status   04/13/2023 9.8 8.4 - 10.2 mg/dL Final     Albumin Level   Date Value Ref Range Status   12/05/2022 3.7 3.4 - 4.8 gm/dL Final     Bilirubin Total   Date Value Ref Range Status   12/05/2022 0.4 <=1.5 mg/dL Final     Alkaline Phosphatase   Date Value Ref Range Status   12/05/2022 118 40 - 150 unit/L Final     Aspartate Aminotransferase   Date Value Ref Range Status   12/05/2022 11 5 - 34 unit/L Final     Alanine Aminotransferase   Date Value Ref Range Status  "  12/05/2022 20 0 - 55 unit/L Final     Estimated GFR-Non    Date Value Ref Range Status   04/13/2022 84 >=90      Lab Results   Component Value Date    CHOL 115 04/13/2022     Lab Results   Component Value Date    HDL 45 04/13/2022     No results found for: LDLCALC  Lab Results   Component Value Date    TRIG 72 04/13/2022     No results found for: CHOLHDL  Lab Results   Component Value Date    TSH 0.7531 04/13/2022     Lab Results   Component Value Date    PHUR 6.0 04/13/2022    PROTEINUA Negative 10/13/2022    GLUCUA Normal 04/13/2022    KETONESU Negative 04/13/2022    OCCULTUA Trace 04/13/2022    NITRITE Negative 04/13/2022    LEUKOCYTESUR Negative 10/13/2022           Assessment and Plan (including Health Maintenance)     Plan:         Health Maintenance Due   Topic Date Due    Cervical Cancer Screening  Never done    COVID-19 Vaccine (3 - Booster for Moderna series) 06/04/2021    Lipid Panel  04/13/2023    Diabetes Urine Screening  04/13/2023    Foot Exam  05/11/2023    Eye Exam  05/11/2023       Problem List Items Addressed This Visit          Neuro    Degenerative disc disease, lumbar    Current Assessment & Plan     Patient undergoing PT with [facility] in [place], [place]. Initial eval 5/31/22. Plans were for PT 3x/week x 6 weeks (with certification period ending 7/11/22). Pt states she has only been able to afford PT once a week. States "somewhat" helpful  Requesting refill on Baclofen  Also send Lidoderm for PRN use pain  Pt informed to f/u via phone call once discharged from PT. If symptoms not significantly improved, plans are to request MRI L spine and possibly refer for interventional pain mgmt. Pt agrees with plan              Cardiac/Vascular    Primary hypertension    Overview       Currently on Hydralazine 50mg BID, Carvedilol 25mg BID, Amlodipine 10mg every day, Losartan 25 mg po daily           Current Assessment & Plan     Bp at goal today  Reports has been at goal at " "home  Continue current regimen  DASH              Endocrine    Diabetes    Overview     Current medications: Farxiga 5 mg po daily, Metformin 500 mg po BID  A1c level: 6.3% (2022), 7.7% 22, previously 6.4%, goal <7  CBG trends: < 150  Educated on diabetic diet: Low-fat, Low-carb, Low-cholesterol. Avoid sugary/dark drinks/sodas and white foods (i.e., bread, pasta, potatoes, rice)  Aerobic exercise: 20-30 min/day x 5 days/week  Diabetic Eye Exam: fundus 2022, no DR  Diabetic Foot Exam: 22, WNL  Microalbumin-U: slightly elevated  Kidney Protection: ARB  Statin Therapy: Yes, LDL 50s               Relevant Orders    Hemoglobin A1C (Completed)    Comprehensive Metabolic Panel (Completed)    CBC Auto Differential (Completed)    Multinodular thyroid    Current Assessment & Plan     She saw ENT 22. Plan per ENT "61 y.o. female with multinodular goiter, 2.1cm left thyroid nodule. There has been moderate growth over consecutive u/s beginning in 2019. S/p FNA , path benign- reviewed with pt.  - F/u with PCP  - RTC prn"            Other Visit Diagnoses       Chronic bilateral low back pain without sciatica    -  Primary            Health Maintenance Topics with due status: Not Due       Topic Last Completion Date    Colorectal Cancer Screening 2022    Hemoglobin A1c 2022    Mammogram 2023    High Dose Statin 2023       Future Appointments   Date Time Provider Department Center   2023  8:45 AM MARICHUY Leonardo [facility]   2023  1:00 PM Milton Connolly MD [facility]            Signature:  MARICHUY Leonardo  [facility]  [address] [address]    Date of encounter: 22    MARICHUY Leonardo   [facility]  [address] [address]      PATIENT NAME: Fernanda Singh  : 1960  DATE: " 7/14/22  MRN: [id number]      Billing Provider: MARICHUY Leonardo  Level of Service:   Patient PCP Information       Provider PCP Type    MARICHUY Leonardo General            Reason for Visit / Chief Complaint: Follow-up (BP f/U)       History of Present Illness / Problem Focused Workflow     Fernanda Singh presents to the clinic with Follow-up (BP f/U)     Initial Visit (3/30/21): 60 y.o. AA female presenting [place] [facility] [place].   Previous PCP: Rachel Goff 01/2020   PmHx: mild CAD, hypertension, GERD, CKD, chronic tobacco use, obesity, breast mass (2016), uterine CA (5/2017), H.Pylori (2019), colon polyps  SHx: left breast lumpectomy (2017), thyroid bx (7/2019), tubal, total hysterectomy (2018), EGD 2019, colonoscopy 5/2019--hyperplastic polyp   FHx: Esophageal cancer (brother), cirrhosis (brother), Renal failure, heart dz, HF, HTN, HLD   Complaints today: Establish care     Ms. Michael is a pleasant 61 yo female presenting to re-establish primary care. Has not followed up with a PCP since 2020. States previous PCP moved. She is following [facility] clinic here for CAD and CHF (per report). Also following Heme/Onc and GYN clinics for h/o uterine ca diagnosed in 2017. She had a left breast mass removed in 2016. [facility] [place] path was benign. She is UTD on colon cancer screening. H/o colon polyp. Recommended repeat colonoscopy 5/2024. She has a h/o diabetes diagnosed ~1-2 years ago. Was given Glipizide by last PCP before she moved. No recent A1c on file. H/o goiter with thyroid nodules. [facility] [place] had a thyroid bx 7/2019 that was negative per report. She needs a refill on Glipizide. No other problems stated.    Health Maintenance:   Colon Ca Screening-colonoscopy 5/2019--hyperplastic polyp  Breast Ca Screening-Mammo 12/2020, benign   Bone Density 12/2020 WNL    (4/30/21): Pt presenting to review labs. Since last visit, she was seen in [facility] 4/14/21. Underwent fundus photo which was negative for  "    Lab review:      A1c 6.8   LDL 48   TSH WNL   RBCs on UA. Noted intermittently since 2019.     Thyroid US 4/29/21 revealed a multinodular thyroid with at least one thyroid nodule on left side (1.9 cm) meeting the criteria for FNA.     Pt c/o right hand pain and numbness to first three fingers. Concerned about CTS. States remodeling home and some of the tools have been difficult to use. C/o stiffness and inability to close hand into a fist on occasion upon awakening.   Denies fever, chills, cough, chest pain, SOB, abd pain, dysuria, gross hematuria, leg pain, or any other concerns.    (10/15/21): Pt presenting for routine f/u.   Lab review:   HgA1c 5.7%      Renal indices stable   FLP WNL   Hgb 11.9-stable and improved   RBCs on UA 6-10   She underwent CT A/P in June 2/2 [facility]. No overt path on CT other than renal cyst of the lower right kidney. Urine cytology negative. States GYN explained that RBCS in UA likely due to vaginal irritation/inflammation 2/2 radiation tx. No gross hematuria.     C/o lower back pain x 1 mth. Long-standing hx of back pain but worse over past month. No overt injuries/accidents or anything "out of the norm." States pain worse at night, occasionally awakening her from her sleep. Exacerbated by standing > 10 min or strenuous activity over the course of the day. Denies falls, lower ext weakness, B/B incontinence. Relieved with rest and Naprosyn PRN--takes at night. States years since last imaging.     Bp slightly elevated. Asymptomatic. Taking medications as prescribed. F/u with [facility] today.    (5/11/22): Pt presenting for routine f/u. She had labs completed in 4/2022 but missed a f/u to review. She was referred to ENT last April for a multinodular thyroid with one meeting criteria for FNA but didn't make appt. Also states she needs to call to re-schedule appts in GYN, oncologic GYN in [place], and [facility] clinic. She had a negative screening mammogram (BI-RAD 2) 1/5/2022. Today, " "she continues with lower back pain as mentioned at last visit. She was referred to P.T. but ultimately declined appt at the time. Long-standing hx of back pain but worse over past month. No overt injuries/accidents or anything "out of the norm." States pain worse at night, occasionally awakening her from her sleep. Exacerbated by standing > 10 min or strenuous activity over the course of the day. Denies falls, lower ext weakness, B/B incontinence. Relieved with rest and Naprosyn PRN--takes at night. She's also used Flexeril in the past with some relief    Today's Visit (7/14/22): Pt presenting for routine f/u. Bp elevated at last visit; at goal today. Taking medications as prescribed. Previously referred to PT for chronic lower back pain ongoing at least 10 years. Now following [facility] in [place], [place]. Initial eval 5/31/22. Plans were for PT 3x/week x 6 weeks (with certification period ending 7/11/22). Pt states she has only been able to afford PT once a week. States "somewhat" helpful. No saddle anesthesia, B/B incontinence, or falls. Requesting refill on Baclofen. She's seen GYN and ENT since last appt. Stable and doing well. No other concerns.      Review of Systems     Review of Systems   Constitutional: Negative.    HENT: Negative.     Eyes: Negative.    Respiratory: Negative.     Cardiovascular: Negative.    Gastrointestinal: Negative.    Endocrine: Negative.    Genitourinary: Negative.    Musculoskeletal:  Positive for back pain.   Skin: Negative.    Allergic/Immunologic: Negative.    Neurological: Negative.    Hematological: Negative.    Psychiatric/Behavioral: Negative.       Medical / Social / Family History     Past Medical History:   Diagnosis Date    Cancer 2018    CHF (congestive heart failure) 2012    Diabetes mellitus 2019    Disorder of kidney and ureter 2013    GERD (gastroesophageal reflux disease) 2013    Hypertension 2011    Sleep apnea 2013       Past Surgical History:   Procedure " Laterality Date    ANGIOGRAM, CORONARY, WITH LEFT HEART CATHETERIZATION N/A 9/28/2022    Procedure: Angiogram, Coronary, with Left Heart Cath;  Surgeon: Milton Connolly MD;  Location: [facility] C[facility] LAB;  Service: Cardiology;  Laterality: N/A;    BILATERAL TUBAL LIGATION      Biopsy Gastrointestinal  11/13/2019    BIOPSY OF THYROID  07/2019    Catheter Inserrtion Mediport      [id number]    CERVICAL CONIZATION   W/ LASER  05/01/2018    COLONOSCOPY  05/10/2019    COLONOSCOPY  09/24/2014    DILATION AND CURETTAGE OF UTERUS  05/01/2018    ESOPHAGOGASTRODUODENOSCOPY  09/24/2014    ESOPHAGOGASTRODUODENOSCOPY  11/13/2019    Left breast small mass removal      Myomectomy Hysteroscopic  05/01/2018    POLYPECTOMY  05/10/2019    POLYPECTOMY  09/24/2014    TUBAL LIGATION         Social History  Ms.  reports that she has been smoking cigarettes. She has been exposed to tobacco smoke. She has never used smokeless tobacco. She reports that she does not currently use alcohol after a past usage of about 1.0 standard drink per week. She reports that she does not currently use drugs.    Family History  Ms.'s family history includes Cirrhosis in her brother; Diabetes in her father; Esophageal cancer in her brother; Heart attack in her father; Heart failure in her mother; Hypertension in her brother, father, and mother; Kidney failure in her mother; Leukemia in her brother.    Medications and Allergies     Medications  Outpatient Medications Marked as Taking for the 7/14/22 encounter (Office Visit) with MARICHUY Leonardo   Medication Sig Dispense Refill    gabapentin (NEURONTIN) 600 MG tablet Take 1 tablet (600 mg total) by mouth 3 (three) times daily. 270 tablet 1    [DISCONTINUED] amLODIPine (NORVASC) 10 MG tablet Take 10 mg by mouth.      [DISCONTINUED] aspirin (ECOTRIN) 81 MG EC tablet Take 81 mg by mouth.      [DISCONTINUED] buPROPion (WELLBUTRIN SR) 150 MG TBSR 12 hr tablet Take 150 mg by mouth 2 (two) times daily.      [DISCONTINUED]  buPROPion (WELLBUTRIN XL) 150 MG TB24 tablet TAKE 1 TABLET BY MOUTH DAILY FOR 7 DAYS, THEN INCREASE TO 1 TABLET BY MOUTH EVERY 12 HOURS AS TOLERATED 60 tablet 2    [DISCONTINUED] buPROPion (WELLBUTRIN XL) 150 MG TB24 tablet Take 150 mg by mouth 2 (two) times a day.      [DISCONTINUED] carvediloL (COREG) 25 MG tablet Take 25 mg by mouth 2 (two) times a day.      [DISCONTINUED] furosemide (LASIX) 40 MG tablet Take 40 mg by mouth once daily.      [DISCONTINUED] glipiZIDE (GLUCOTROL) 10 MG tablet Take 1 tablet (10 mg total) by mouth daily with breakfast. 90 tablet 1    [DISCONTINUED] hydrALAZINE (APRESOLINE) 50 MG tablet Take 50 mg by mouth 2 (two) times daily.      [DISCONTINUED] rosuvastatin (CRESTOR) 10 MG tablet Take 10 mg by mouth every evening.         Allergies  Review of patient's allergies indicates:   Allergen Reactions    Lisinopril Swelling     Other reaction(s): Angioedema of lips       Physical Examination     Vitals:    07/14/22 1133   BP: 134/85   Pulse: (!) 57   Resp: 18   Temp: 97.9 °F (36.6 °C)     Physical Exam  Constitutional:       Appearance: Normal appearance.   HENT:      Head: Normocephalic and atraumatic.      Mouth/Throat:      Mouth: Mucous membranes are moist.   Eyes:      Extraocular Movements: Extraocular movements intact.      Conjunctiva/sclera: Conjunctivae normal.      Pupils: Pupils are equal, round, and reactive to light.   Cardiovascular:      Rate and Rhythm: Normal rate and regular rhythm.      Pulses: Normal pulses.      Heart sounds: Normal heart sounds.   Pulmonary:      Effort: Pulmonary effort is normal.      Breath sounds: Normal breath sounds.   Musculoskeletal:         General: Normal range of motion.      Right lower leg: No edema.      Left lower leg: No edema.   Skin:     General: Skin is warm and dry.   Neurological:      General: No focal deficit present.      Mental Status: She is alert and oriented to person, place, and time.   Psychiatric:         Mood and Affect:  Mood normal.         Behavior: Behavior normal.         Thought Content: Thought content normal.         Judgment: Judgment normal.         Results     Lab Results   Component Value Date    WBC 7.7 12/05/2022    RBC 4.61 12/05/2022    HGB 11.6 (L) 12/05/2022    HCT 37.6 12/05/2022    MCV 81.6 12/05/2022    MCH 25.2 (L) 12/05/2022    MCHC 30.9 (L) 12/05/2022    RDW 16.2 12/05/2022     12/05/2022    MPV 10.6 (H) 12/05/2022     CMP  Sodium Level   Date Value Ref Range Status   04/13/2023 141 136 - 145 mmol/L Final     Potassium Level   Date Value Ref Range Status   04/13/2023 4.1 3.5 - 5.1 mmol/L Final     Carbon Dioxide   Date Value Ref Range Status   04/13/2023 26 23 - 31 mmol/L Final     Blood Urea Nitrogen   Date Value Ref Range Status   04/13/2023 12.0 9.8 - 20.1 mg/dL Final     Creatinine   Date Value Ref Range Status   04/13/2023 0.93 0.55 - 1.02 mg/dL Final     Calcium Level Total   Date Value Ref Range Status   04/13/2023 9.8 8.4 - 10.2 mg/dL Final     Albumin Level   Date Value Ref Range Status   12/05/2022 3.7 3.4 - 4.8 gm/dL Final     Bilirubin Total   Date Value Ref Range Status   12/05/2022 0.4 <=1.5 mg/dL Final     Alkaline Phosphatase   Date Value Ref Range Status   12/05/2022 118 40 - 150 unit/L Final     Aspartate Aminotransferase   Date Value Ref Range Status   12/05/2022 11 5 - 34 unit/L Final     Alanine Aminotransferase   Date Value Ref Range Status   12/05/2022 20 0 - 55 unit/L Final     Estimated GFR-Non    Date Value Ref Range Status   04/13/2022 84 >=90      Lab Results   Component Value Date    CHOL 115 04/13/2022     Lab Results   Component Value Date    HDL 45 04/13/2022     No results found for: LDLCALC  Lab Results   Component Value Date    TRIG 72 04/13/2022     No results found for: CHOLHDL  Lab Results   Component Value Date    TSH 0.7531 04/13/2022     Lab Results   Component Value Date    PHUR 6.0 04/13/2022    PROTEINUA Negative 10/13/2022    GLUCUA Normal  "04/13/2022    KETONESU Negative 04/13/2022    OCCULTUA Trace 04/13/2022    NITRITE Negative 04/13/2022    LEUKOCYTESUR Negative 10/13/2022           Assessment and Plan (including Health Maintenance)     Plan:         Health Maintenance Due   Topic Date Due    Cervical Cancer Screening  Never done    COVID-19 Vaccine (3 - Booster for Moderna series) 06/04/2021    Lipid Panel  04/13/2023    Diabetes Urine Screening  04/13/2023    Foot Exam  05/11/2023    Eye Exam  05/11/2023       Problem List Items Addressed This Visit          Neuro    Degenerative disc disease, lumbar    Current Assessment & Plan     Patient undergoing PT with [facility] in [place], [place]. Initial eval 5/31/22. Plans were for PT 3x/week x 6 weeks (with certification period ending 7/11/22). Pt states she has only been able to afford PT once a week. States "somewhat" helpful  Requesting refill on Baclofen  Also send Lidoderm for PRN use pain  Pt informed to f/u via phone call once discharged from PT. If symptoms not significantly improved, plans are to request MRI L spine and possibly refer for interventional pain mgmt. Pt agrees with plan              Cardiac/Vascular    Primary hypertension    Overview       Currently on Hydralazine 50mg BID, Carvedilol 25mg BID, Amlodipine 10mg every day, Losartan 25 mg po daily           Current Assessment & Plan     Bp at goal today  Reports has been at goal at home  Continue current regimen  DASH              Endocrine    Diabetes    Overview     Current medications: Farxiga 5 mg po daily, Metformin 500 mg po BID  A1c level: 6.3% (12/2022), 7.7% 9/27/22, previously 6.4%, goal <7  CBG trends: < 150  Educated on diabetic diet: Low-fat, Low-carb, Low-cholesterol. Avoid sugary/dark drinks/sodas and white foods (i.e., bread, pasta, potatoes, rice)  Aerobic exercise: 20-30 min/day x 5 days/week  Diabetic Eye Exam: fundus 5/2022, no DR  Diabetic Foot Exam: 5/11/22, WNL  Microalbumin-U: slightly elevated  Kidney " "Protection: ARB  Statin Therapy: Yes, LDL 50s               Relevant Orders    Hemoglobin A1C (Completed)    Comprehensive Metabolic Panel (Completed)    CBC Auto Differential (Completed)    Multinodular thyroid    Current Assessment & Plan     She saw ENT 7/7/22. Plan per ENT "61 y.o. female with multinodular goiter, 2.1cm left thyroid nodule. There has been moderate growth over consecutive u/s beginning in 2019. S/p FNA 6/16, path benign- reviewed with pt.  - F/u with PCP  - RTC prn"            Other Visit Diagnoses       Chronic bilateral low back pain without sciatica    -  Primary            Health Maintenance Topics with due status: Not Due       Topic Last Completion Date    Colorectal Cancer Screening 05/21/2022    Hemoglobin A1c 12/05/2022    Mammogram 01/11/2023    High Dose Statin 04/26/2023       Future Appointments   Date Time Provider Department Center   7/26/2023  8:45 AM MARICHUY Leonardo [facility]   8/31/2023  1:00 PM Milton Connolly MD [facility]            Signature:  MARICHUY Leonardo  [facility]  [address] [id number]    Date of encounter: 7/14/22    "

## 2022-07-13 NOTE — ASSESSMENT & PLAN NOTE
"She saw ENT 7/7/22. Plan per ENT "61 y.o. female with multinodular goiter, 2.1cm left thyroid nodule. There has been moderate growth over consecutive u/s beginning in 2019. S/p FNA 6/16, path benign- reviewed with pt.  - F/u with PCP  - RTC prn"  "

## 2022-07-14 ENCOUNTER — OFFICE VISIT (OUTPATIENT)
Dept: INTERNAL MEDICINE | Facility: CLINIC | Age: 62
End: 2022-07-14
Payer: MEDICARE

## 2022-07-14 VITALS
BODY MASS INDEX: 43.17 KG/M2 | HEIGHT: 68 IN | SYSTOLIC BLOOD PRESSURE: 134 MMHG | HEART RATE: 57 BPM | WEIGHT: 284.81 LBS | RESPIRATION RATE: 18 BRPM | TEMPERATURE: 98 F | DIASTOLIC BLOOD PRESSURE: 85 MMHG

## 2022-07-14 DIAGNOSIS — G89.29 CHRONIC BILATERAL LOW BACK PAIN WITHOUT SCIATICA: Primary | ICD-10-CM

## 2022-07-14 DIAGNOSIS — M54.50 CHRONIC BILATERAL LOW BACK PAIN WITHOUT SCIATICA: Primary | ICD-10-CM

## 2022-07-14 DIAGNOSIS — M51.36 DEGENERATIVE DISC DISEASE, LUMBAR: ICD-10-CM

## 2022-07-14 DIAGNOSIS — E11.9 TYPE 2 DIABETES MELLITUS WITHOUT COMPLICATION, WITHOUT LONG-TERM CURRENT USE OF INSULIN: ICD-10-CM

## 2022-07-14 DIAGNOSIS — E04.2 MULTINODULAR THYROID: ICD-10-CM

## 2022-07-14 DIAGNOSIS — I10 HYPERTENSION, UNSPECIFIED TYPE: ICD-10-CM

## 2022-07-14 PROBLEM — Z72.0 TOBACCO USE: Status: RESOLVED | Noted: 2019-01-10 | Resolved: 2022-07-14

## 2022-07-14 PROCEDURE — 3079F PR MOST RECENT DIASTOLIC BLOOD PRESSURE 80-89 MM HG: ICD-10-PCS | Mod: CPTII,,, | Performed by: NURSE PRACTITIONER

## 2022-07-14 PROCEDURE — 3066F NEPHROPATHY DOC TX: CPT | Mod: CPTII,,, | Performed by: NURSE PRACTITIONER

## 2022-07-14 PROCEDURE — 1159F MED LIST DOCD IN RCRD: CPT | Mod: CPTII,,, | Performed by: NURSE PRACTITIONER

## 2022-07-14 PROCEDURE — 1159F PR MEDICATION LIST DOCUMENTED IN MEDICAL RECORD: ICD-10-PCS | Mod: CPTII,,, | Performed by: NURSE PRACTITIONER

## 2022-07-14 PROCEDURE — 99215 OFFICE O/P EST HI 40 MIN: CPT | Mod: PBBFAC | Performed by: NURSE PRACTITIONER

## 2022-07-14 PROCEDURE — 3075F PR MOST RECENT SYSTOLIC BLOOD PRESS GE 130-139MM HG: ICD-10-PCS | Mod: CPTII,,, | Performed by: NURSE PRACTITIONER

## 2022-07-14 PROCEDURE — 1160F RVW MEDS BY RX/DR IN RCRD: CPT | Mod: CPTII,,, | Performed by: NURSE PRACTITIONER

## 2022-07-14 PROCEDURE — 99214 PR OFFICE/OUTPT VISIT, EST, LEVL IV, 30-39 MIN: ICD-10-PCS | Mod: S$PBB,,, | Performed by: NURSE PRACTITIONER

## 2022-07-14 PROCEDURE — 3060F POS MICROALBUMINURIA REV: CPT | Mod: CPTII,,, | Performed by: NURSE PRACTITIONER

## 2022-07-14 PROCEDURE — 3075F SYST BP GE 130 - 139MM HG: CPT | Mod: CPTII,,, | Performed by: NURSE PRACTITIONER

## 2022-07-14 PROCEDURE — 3008F BODY MASS INDEX DOCD: CPT | Mod: CPTII,,, | Performed by: NURSE PRACTITIONER

## 2022-07-14 PROCEDURE — 3008F PR BODY MASS INDEX (BMI) DOCUMENTED: ICD-10-PCS | Mod: CPTII,,, | Performed by: NURSE PRACTITIONER

## 2022-07-14 PROCEDURE — 99214 OFFICE O/P EST MOD 30 MIN: CPT | Mod: S$PBB,,, | Performed by: NURSE PRACTITIONER

## 2022-07-14 PROCEDURE — 3066F PR DOCUMENTATION OF TREATMENT FOR NEPHROPATHY: ICD-10-PCS | Mod: CPTII,,, | Performed by: NURSE PRACTITIONER

## 2022-07-14 PROCEDURE — 3079F DIAST BP 80-89 MM HG: CPT | Mod: CPTII,,, | Performed by: NURSE PRACTITIONER

## 2022-07-14 PROCEDURE — 3060F PR POS MICROALBUMINURIA RESULT DOCUMENTED/REVIEW: ICD-10-PCS | Mod: CPTII,,, | Performed by: NURSE PRACTITIONER

## 2022-07-14 PROCEDURE — 1160F PR REVIEW ALL MEDS BY PRESCRIBER/CLIN PHARMACIST DOCUMENTED: ICD-10-PCS | Mod: CPTII,,, | Performed by: NURSE PRACTITIONER

## 2022-07-14 RX ORDER — BUPROPION HYDROCHLORIDE 150 MG/1
150 TABLET, EXTENDED RELEASE ORAL 2 TIMES DAILY
COMMUNITY
End: 2022-09-26

## 2022-07-14 RX ORDER — BACLOFEN 10 MG/1
10 TABLET ORAL 3 TIMES DAILY PRN
Qty: 30 TABLET | Refills: 1 | Status: SHIPPED | OUTPATIENT
Start: 2022-07-14 | End: 2022-12-07 | Stop reason: SDUPTHER

## 2022-07-14 RX ORDER — BUPROPION HYDROCHLORIDE 150 MG/1
150 TABLET ORAL 2 TIMES DAILY
COMMUNITY
Start: 2022-06-08 | End: 2022-07-14 | Stop reason: SDUPTHER

## 2022-07-14 RX ORDER — LIDOCAINE 50 MG/G
1 PATCH TOPICAL DAILY
Qty: 30 PATCH | Refills: 0 | Status: SHIPPED | OUTPATIENT
Start: 2022-07-14 | End: 2022-08-13

## 2022-07-14 NOTE — ASSESSMENT & PLAN NOTE
"Patient undergoing PT with [facility] in [place], [place]. Initial eval 5/31/22. Plans were for PT 3x/week x 6 weeks (with certification period ending 7/11/22). Pt states she has only been able to afford PT once a week. States "somewhat" helpful  Requesting refill on Baclofen  Also send Lidoderm for PRN use pain  Pt informed to f/u via phone call once discharged from PT. If symptoms not significantly improved, plans are to request MRI L spine and possibly refer for interventional pain mgmt. Pt agrees with plan  "

## 2022-07-27 ENCOUNTER — OFFICE VISIT (OUTPATIENT)
Dept: CARDIOLOGY | Facility: CLINIC | Age: 62
End: 2022-07-27
Payer: MEDICARE

## 2022-07-27 VITALS
WEIGHT: 286.63 LBS | HEART RATE: 63 BPM | BODY MASS INDEX: 43.44 KG/M2 | RESPIRATION RATE: 18 BRPM | TEMPERATURE: 98 F | DIASTOLIC BLOOD PRESSURE: 83 MMHG | OXYGEN SATURATION: 100 % | HEIGHT: 68 IN | SYSTOLIC BLOOD PRESSURE: 137 MMHG

## 2022-07-27 DIAGNOSIS — R00.2 PALPITATIONS: Primary | ICD-10-CM

## 2022-07-27 DIAGNOSIS — I10 PRIMARY HYPERTENSION: ICD-10-CM

## 2022-07-27 DIAGNOSIS — Z72.0 TOBACCO USE: ICD-10-CM

## 2022-07-27 DIAGNOSIS — I25.10 MILD CAD: ICD-10-CM

## 2022-07-27 DIAGNOSIS — E78.5 HYPERLIPIDEMIA LDL GOAL <70: ICD-10-CM

## 2022-07-27 PROCEDURE — 3060F POS MICROALBUMINURIA REV: CPT | Mod: CPTII,,, | Performed by: NURSE PRACTITIONER

## 2022-07-27 PROCEDURE — 3008F PR BODY MASS INDEX (BMI) DOCUMENTED: ICD-10-PCS | Mod: CPTII,,, | Performed by: NURSE PRACTITIONER

## 2022-07-27 PROCEDURE — 1160F PR REVIEW ALL MEDS BY PRESCRIBER/CLIN PHARMACIST DOCUMENTED: ICD-10-PCS | Mod: CPTII,,, | Performed by: NURSE PRACTITIONER

## 2022-07-27 PROCEDURE — 1160F RVW MEDS BY RX/DR IN RCRD: CPT | Mod: CPTII,,, | Performed by: NURSE PRACTITIONER

## 2022-07-27 PROCEDURE — 3066F NEPHROPATHY DOC TX: CPT | Mod: CPTII,,, | Performed by: NURSE PRACTITIONER

## 2022-07-27 PROCEDURE — 3079F DIAST BP 80-89 MM HG: CPT | Mod: CPTII,,, | Performed by: NURSE PRACTITIONER

## 2022-07-27 PROCEDURE — 3008F BODY MASS INDEX DOCD: CPT | Mod: CPTII,,, | Performed by: NURSE PRACTITIONER

## 2022-07-27 PROCEDURE — 99213 PR OFFICE/OUTPT VISIT, EST, LEVL III, 20-29 MIN: ICD-10-PCS | Mod: 25,S$PBB,, | Performed by: NURSE PRACTITIONER

## 2022-07-27 PROCEDURE — 3079F PR MOST RECENT DIASTOLIC BLOOD PRESSURE 80-89 MM HG: ICD-10-PCS | Mod: CPTII,,, | Performed by: NURSE PRACTITIONER

## 2022-07-27 PROCEDURE — 99213 OFFICE O/P EST LOW 20 MIN: CPT | Mod: 25,S$PBB,, | Performed by: NURSE PRACTITIONER

## 2022-07-27 PROCEDURE — 1159F MED LIST DOCD IN RCRD: CPT | Mod: CPTII,,, | Performed by: NURSE PRACTITIONER

## 2022-07-27 PROCEDURE — 3066F PR DOCUMENTATION OF TREATMENT FOR NEPHROPATHY: ICD-10-PCS | Mod: CPTII,,, | Performed by: NURSE PRACTITIONER

## 2022-07-27 PROCEDURE — 3075F PR MOST RECENT SYSTOLIC BLOOD PRESS GE 130-139MM HG: ICD-10-PCS | Mod: CPTII,,, | Performed by: NURSE PRACTITIONER

## 2022-07-27 PROCEDURE — 3060F PR POS MICROALBUMINURIA RESULT DOCUMENTED/REVIEW: ICD-10-PCS | Mod: CPTII,,, | Performed by: NURSE PRACTITIONER

## 2022-07-27 PROCEDURE — 1159F PR MEDICATION LIST DOCUMENTED IN MEDICAL RECORD: ICD-10-PCS | Mod: CPTII,,, | Performed by: NURSE PRACTITIONER

## 2022-07-27 PROCEDURE — 3075F SYST BP GE 130 - 139MM HG: CPT | Mod: CPTII,,, | Performed by: NURSE PRACTITIONER

## 2022-07-27 PROCEDURE — 99214 OFFICE O/P EST MOD 30 MIN: CPT | Mod: PBBFAC | Performed by: NURSE PRACTITIONER

## 2022-07-27 PROCEDURE — 93005 ELECTROCARDIOGRAM TRACING: CPT

## 2022-07-27 RX ORDER — AMLODIPINE BESYLATE 10 MG/1
10 TABLET ORAL DAILY
Qty: 90 TABLET | Refills: 3 | Status: SHIPPED | OUTPATIENT
Start: 2022-07-27 | End: 2023-08-23 | Stop reason: SDUPTHER

## 2022-07-27 RX ORDER — ASPIRIN 81 MG/1
81 TABLET ORAL DAILY
Qty: 90 TABLET | Refills: 3 | Status: SHIPPED | OUTPATIENT
Start: 2022-07-27 | End: 2023-07-27

## 2022-07-27 RX ORDER — HYDRALAZINE HYDROCHLORIDE 50 MG/1
50 TABLET, FILM COATED ORAL 2 TIMES DAILY
Qty: 180 TABLET | Refills: 3 | Status: SHIPPED | OUTPATIENT
Start: 2022-07-27 | End: 2023-07-26 | Stop reason: SDUPTHER

## 2022-07-27 RX ORDER — ROSUVASTATIN CALCIUM 10 MG/1
10 TABLET, COATED ORAL NIGHTLY
Qty: 90 TABLET | Refills: 3 | Status: SHIPPED | OUTPATIENT
Start: 2022-07-27 | End: 2023-08-23 | Stop reason: SDUPTHER

## 2022-07-27 RX ORDER — CARVEDILOL 25 MG/1
25 TABLET ORAL 2 TIMES DAILY
Qty: 180 TABLET | Refills: 3 | Status: SHIPPED | OUTPATIENT
Start: 2022-07-27 | End: 2023-08-23 | Stop reason: SDUPTHER

## 2022-07-27 RX ORDER — FUROSEMIDE 40 MG/1
40 TABLET ORAL DAILY
Qty: 90 TABLET | Refills: 3 | Status: SHIPPED | OUTPATIENT
Start: 2022-07-27 | End: 2023-08-23 | Stop reason: SDUPTHER

## 2022-07-27 NOTE — PROGRESS NOTES
CHIEF COMPLAINT:   Chief Complaint   Patient presents with    Follow-up     F/u w/labs                   Review of patient's allergies indicates:   Allergen Reactions    Lisinopril Swelling     Other reaction(s): Angioedema of lips                                          HPI:  Fernanda Singh 61 y.o. female with a past medical history of mild CAD, hypertension, GERD, CKD, chronic tobacco use, obesity, breast mass (2016), uterine CA (5/2017), and hysterectomy presents for 6 month follow up and ongoing care.  She completed a 48-hour Holter monitor in February 2021 which revealed she was in sinus rhythm with an average heart rate of 77 bpm. See report below.     Patient denies chest pain, shortness of breath, orthopnea, PND, peripheral edema, or syncope.  Patient reports palpitations over the last several weeks since starting Wellbutrin.  She states the palpitations occur once or twice a week and quickly resolves on its own.  She reports compliance with her medications.  Patient states she is currently smoking 5 cigarettes a day with plans to quit altogether.    48-hour Holter monitor February 2021:  The patient was in sinus rhythm with an average heart rate of 77 bpm. Heart rates greater than 120 bpm were noted less than 1% of the time. No episodes of bradycardia was noted. Fine less than 1% of beats were due to PVCs. Short burst of V run lasting for 4 beats at 200 bpm.  Occasional PACs. Episode of SVT lasting for 8 beats at 148 bpm.  No diary was returned.    Echo 2/20  Mild concentric left ventricular hypertrophy.Left ventricular ejection fraction is measured at approximately 50 to 55 %.    Holter (48hr)-11/2016 : Patient was in normal sinus rhythm throughout. Supraventricular ectopic beats consisted of very rare premature beats. Ventricular ectopic beats consisted of very rare unifocal beats.    Left heart cath on September 19, 2016:  Mild CAD  Hypertensive disease  Normal LVEF 60%  Recommendations: Medical  management and aggressive risk factor management    ECHO (2015)  EF >55%                                                                                                                                                                                                                                                              Patient Active Problem List   Diagnosis    Anxiety    Dysplasia of cervix    Carcinosarcoma of endometrium    CHF (congestive heart failure)    Degenerative disc disease, lumbar    Endometrial cancer    Gastroesophageal reflux disease    HTN (hypertension)    Morbid obesity due to excess calories    Peripheral neuropathy due to and not concurrent with chemotherapy    T2DM (type 2 diabetes mellitus)    Tobacco user    Encounter for screening for diabetic retinopathy    Multinodular thyroid    Uterine fibroid    Malignant neoplasm of endometrium    Wellness examination     Past Surgical History:   Procedure Laterality Date    BILATERAL TUBAL LIGATION      Biopsy Gastrointestinal  11/13/2019    BIOPSY OF THYROID  07/2019    Catheter Inserrtion Mediport      [id number]    CERVICAL CONIZATION   W/ LASER  05/01/2018    COLONOSCOPY  05/10/2019    COLONOSCOPY  09/24/2014    DILATION AND CURETTAGE OF UTERUS  05/01/2018    ESOPHAGOGASTRODUODENOSCOPY  09/24/2014    ESOPHAGOGASTRODUODENOSCOPY  11/13/2019    Left breast small mass removal      Myomectomy Hysteroscopic  05/01/2018    POLYPECTOMY  05/10/2019    POLYPECTOMY  09/24/2014    TUBAL LIGATION       Social History     Socioeconomic History    Marital status:      Spouse name: Jude    Number of children: 3    Highest education level: Associate degree: occupational, technical, or vocational program   Occupational History    Occupation: unemployed   Tobacco Use    Smoking status: Current Every Day Smoker     Packs/day: 0.25     Years: 15.00     Pack years: 3.75     Types: Cigarettes    Smokeless tobacco: Never  Used   Substance and Sexual Activity    Alcohol use: Not Currently     Alcohol/week: 1.0 standard drink     Types: 1 Glasses of wine per week     Comment: monthly    Drug use: Not Currently    Sexual activity: Yes     Partners: Male        Family History   Problem Relation Age of Onset    Hypertension Mother     Heart failure Mother     Kidney failure Mother     Heart attack Father     Hypertension Father     Diabetes Father     Leukemia Brother     Cirrhosis Brother     Hypertension Brother     Esophageal cancer Brother          Current Outpatient Medications:     amLODIPine (NORVASC) 10 MG tablet, Take 10 mg by mouth., Disp: , Rfl:     aspirin (ECOTRIN) 81 MG EC tablet, Take 81 mg by mouth., Disp: , Rfl:     buPROPion (WELLBUTRIN SR) 150 MG TBSR 12 hr tablet, Take 150 mg by mouth 2 (two) times daily., Disp: , Rfl:     carvediloL (COREG) 25 MG tablet, Take 25 mg by mouth 2 (two) times a day., Disp: , Rfl:     furosemide (LASIX) 40 MG tablet, Take 40 mg by mouth once daily., Disp: , Rfl:     gabapentin (NEURONTIN) 600 MG tablet, Take 1 tablet (600 mg total) by mouth 3 (three) times daily., Disp: 270 tablet, Rfl: 1    glipiZIDE (GLUCOTROL) 10 MG tablet, Take 1 tablet (10 mg total) by mouth daily with breakfast., Disp: 90 tablet, Rfl: 1    hydrALAZINE (APRESOLINE) 50 MG tablet, Take 50 mg by mouth 2 (two) times daily., Disp: , Rfl:     LIDOcaine (LIDODERM) 5 %, Place 1 patch onto the skin once daily. Remove & Discard patch within 12 hours or as directed by MD, Disp: 30 patch, Rfl: 0    rosuvastatin (CRESTOR) 10 MG tablet, Take 10 mg by mouth every evening., Disp: , Rfl:     baclofen (LIORESAL) 10 MG tablet, Take 1 tablet (10 mg total) by mouth 3 (three) times daily as needed (muscle spasm)., Disp: 30 tablet, Rfl: 1    dicyclomine (BENTYL) 20 mg tablet, Take 20 mg by mouth once daily at 6am., Disp: , Rfl:     promethazine (PHENERGAN) 12.5 MG Tab, Take 12.5 mg by mouth every 6 (six) hours as  "needed., Disp: , Rfl:      ROS:                                                                                                                                                                             Review of Systems   Constitutional: Negative.    Respiratory: Negative.    Cardiovascular: Positive for palpitations.   Gastrointestinal: Negative.    Musculoskeletal: Negative.    Skin: Negative.    Neurological: Negative.    Psychiatric/Behavioral: Negative.         Blood pressure 137/83, pulse 63, temperature 98.1 °F (36.7 °C), resp. rate 18, height 5' 7.99" (1.727 m), weight 130 kg (286 lb 9.6 oz), SpO2 100 %.   PE:  Physical Exam  Constitutional:       Appearance: Normal appearance.   HENT:      Head: Normocephalic and atraumatic.   Eyes:      Extraocular Movements: Extraocular movements intact.      Pupils: Pupils are equal, round, and reactive to light.   Cardiovascular:      Rate and Rhythm: Normal rate and regular rhythm.   Pulmonary:      Effort: Pulmonary effort is normal.      Breath sounds: Normal breath sounds.   Abdominal:      Palpations: Abdomen is soft.   Musculoskeletal:         General: Normal range of motion.      Cervical back: Normal range of motion. No tenderness.   Skin:     General: Skin is warm and dry.   Neurological:      General: No focal deficit present.      Mental Status: She is alert and oriented to person, place, and time.   Psychiatric:         Mood and Affect: Mood normal.         Behavior: Behavior normal.        ASSESSMENT/PLAN:  Mild CAD   Denies chest pain or shortness of breath  Continue aspirin, coreg, and crestor  Counseled on heart healthy diet, exercise, and smoking cessation    HFpEF  EF 50-55% per Echo Feb. 2020   Denies SOB  Continue Lasix    HTN (hypertension)   BP at goal - 137/83  Continue Amlodipine, Coreg, hydralazine, and Lasix  Counseled on low salt diet and exercise    Palpitations  48 hour Holter monitor February 2021 - revealed she was in sinus rhythm with " an average heart rate of 77 bpm. See report under HPI.   Reports palpitations 1-2 times a week over the last 3 weeks since starting Wellbutrin    HLD  LDL at goal - 56  Continue Crestor 10mg daily   Counseled on low cholesterol diet and exercise    DM  A1C at goal - 6.4  Management per PCP    Tobacco use  Counseled on smoking cessation  She currently smokes about 5 cigarettes per day, but is trying to quit altogether with the help of Wellbutrin     EKG today   Follow up in Cardiology Clinic in 4 months

## 2022-07-27 NOTE — PATIENT INSTRUCTIONS
EKG today   Follow up in Cardiology Clinic in 4 months  ASSESSMENT/PLAN:  Mild CAD   Denies chest pain or shortness of breath  Continue aspirin, coreg, and crestor  Counseled on heart healthy diet, exercise, and smoking cessation    HFpEF  EF 50-55% per Echo Feb. 2020   Denies SOB  Continue Lasix    HTN (hypertension)   BP at goal - 137/83  Continue Amlodipine, Coreg, hydralazine, and Lasix  Counseled on low salt diet and exercise    Palpitations  48 hour Holter monitor February 2021 - revealed she was in sinus rhythm with an average heart rate of 77 bpm. See report under HPI.   Reports palpitations 1-2 times a week over the last 3 weeks since starting Wellbutrin    HLD  LDL at goal - 56  Continue Crestor 10mg daily   Counseled on low cholesterol diet and exercise    DM  A1C at goal - 6.4  Management per PCP    Tobacco use  Counseled on smoking cessation  She currently smokes about 5 cigarettes per day, but is trying to quit altogether with the help of others

## 2022-08-15 PROBLEM — Z13.5: Status: RESOLVED | Noted: 2022-05-11 | Resolved: 2022-08-15

## 2022-09-22 ENCOUNTER — HISTORICAL (OUTPATIENT)
Dept: ADMINISTRATIVE | Facility: HOSPITAL | Age: 62
End: 2022-09-22
Payer: MEDICARE

## 2022-09-26 ENCOUNTER — HOSPITAL ENCOUNTER (INPATIENT)
Facility: HOSPITAL | Age: 62
LOS: 3 days | Discharge: HOME | DRG: 287 | End: 2022-09-29
Attending: FAMILY MEDICINE | Admitting: INTERNAL MEDICINE
Payer: MEDICARE

## 2022-09-26 DIAGNOSIS — R07.9 CHEST PAIN, UNSPECIFIED TYPE: ICD-10-CM

## 2022-09-26 DIAGNOSIS — I21.4 NSTEMI (NON-ST ELEVATED MYOCARDIAL INFARCTION): ICD-10-CM

## 2022-09-26 DIAGNOSIS — I25.10 MILD CAD: ICD-10-CM

## 2022-09-26 DIAGNOSIS — I20.0 UNSTABLE ANGINA: Primary | ICD-10-CM

## 2022-09-26 LAB
ALBUMIN SERPL-MCNC: 3.7 GM/DL (ref 3.4–4.8)
ALBUMIN/GLOB SERPL: 0.9 RATIO (ref 1.1–2)
ALP SERPL-CCNC: 121 UNIT/L (ref 40–150)
ALT SERPL-CCNC: 19 UNIT/L (ref 0–55)
APTT PPP: 29.1 SECONDS
AST SERPL-CCNC: 11 UNIT/L (ref 5–34)
AV INDEX (PROSTH): 0.58
AV MEAN GRADIENT: 4 MMHG
AV PEAK GRADIENT: 8 MMHG
AV VALVE AREA: 2.2 CM2
AV VELOCITY RATIO: 0.65
BASOPHILS # BLD AUTO: 0.02 X10(3)/MCL (ref 0–0.2)
BASOPHILS NFR BLD AUTO: 0.3 %
BILIRUBIN DIRECT+TOT PNL SERPL-MCNC: 0.5 MG/DL
BNP BLD-MCNC: 20.7 PG/ML
BSA FOR ECHO PROCEDURE: 2.51 M2
BUN SERPL-MCNC: 11.8 MG/DL (ref 9.8–20.1)
CALCIUM SERPL-MCNC: 9.7 MG/DL (ref 8.4–10.2)
CHLORIDE SERPL-SCNC: 104 MMOL/L (ref 98–107)
CO2 SERPL-SCNC: 27 MMOL/L (ref 23–31)
CREAT SERPL-MCNC: 0.97 MG/DL (ref 0.55–1.02)
DOP CALC AO PEAK VEL: 1.42 M/S
DOP CALC AO VTI: 29.1 CM
DOP CALC LVOT AREA: 3.8 CM2
DOP CALC LVOT DIAMETER: 2.19 CM
DOP CALC LVOT PEAK VEL: 0.92 M/S
DOP CALC LVOT STROKE VOLUME: 64 CM3
DOP CALC MV VTI: 33.9 CM
DOP CALCLVOT PEAK VEL VTI: 17 CM
E WAVE DECELERATION TIME: 242.41 MSEC
E/A RATIO: 0.7
E/E' RATIO: 10 M/S
EJECTION FRACTION: 60 %
EOSINOPHIL # BLD AUTO: 0.16 X10(3)/MCL (ref 0–0.9)
EOSINOPHIL NFR BLD AUTO: 2.8 %
ERYTHROCYTE [DISTWIDTH] IN BLOOD BY AUTOMATED COUNT: 15.7 % (ref 11.5–17)
GFR SERPLBLD CREATININE-BSD FMLA CKD-EPI: >60 MLS/MIN/1.73/M2
GLOBULIN SER-MCNC: 3.9 GM/DL (ref 2.4–3.5)
GLUCOSE SERPL-MCNC: 239 MG/DL (ref 82–115)
HCT VFR BLD AUTO: 38.4 % (ref 37–47)
HGB BLD-MCNC: 12 GM/DL (ref 12–16)
IMM GRANULOCYTES # BLD AUTO: 0.02 X10(3)/MCL (ref 0–0.04)
IMM GRANULOCYTES NFR BLD AUTO: 0.3 %
LEFT ATRIUM SIZE: 5 CM
LIPASE SERPL-CCNC: 20 U/L
LV LATERAL E/E' RATIO: 9.29 M/S
LV SEPTAL E/E' RATIO: 10.83 M/S
LVOT MG: 1.59 MMHG
LVOT MV: 0.59 CM/S
LYMPHOCYTES # BLD AUTO: 2.38 X10(3)/MCL (ref 0.6–4.6)
LYMPHOCYTES NFR BLD AUTO: 41.1 %
MAGNESIUM SERPL-MCNC: 1.8 MG/DL (ref 1.6–2.6)
MCH RBC QN AUTO: 25.3 PG (ref 27–31)
MCHC RBC AUTO-ENTMCNC: 31.3 MG/DL (ref 33–36)
MCV RBC AUTO: 80.8 FL (ref 80–94)
MONOCYTES # BLD AUTO: 0.49 X10(3)/MCL (ref 0.1–1.3)
MONOCYTES NFR BLD AUTO: 8.5 %
MV MEAN GRADIENT: 1 MMHG
MV PEAK A VEL: 0.93 M/S
MV PEAK E VEL: 0.65 M/S
MV PEAK GRADIENT: 3 MMHG
MV STENOSIS PRESSURE HALF TIME: 70.3 MS
MV VALVE AREA BY CONTINUITY EQUATION: 1.89 CM2
MV VALVE AREA P 1/2 METHOD: 3.13 CM2
NEUTROPHILS # BLD AUTO: 2.7 X10(3)/MCL (ref 2.1–9.2)
NEUTROPHILS NFR BLD AUTO: 47 %
NRBC BLD AUTO-RTO: 0 %
PHOSPHATE SERPL-MCNC: 2.6 MG/DL (ref 2.3–4.7)
PLATELET # BLD AUTO: 270 X10(3)/MCL (ref 130–400)
PMV BLD AUTO: 11.2 FL (ref 7.4–10.4)
POTASSIUM SERPL-SCNC: 4 MMOL/L (ref 3.5–5.1)
PROT SERPL-MCNC: 7.6 GM/DL (ref 5.8–7.6)
RA PRESSURE: 3 MMHG
RBC # BLD AUTO: 4.75 X10(6)/MCL (ref 4.2–5.4)
RIGHT VENTRICULAR END-DIASTOLIC DIMENSION: 2.36 CM
SODIUM SERPL-SCNC: 139 MMOL/L (ref 136–145)
TDI LATERAL: 0.07 M/S
TDI SEPTAL: 0.06 M/S
TDI: 0.07 M/S
TROPONIN I SERPL-MCNC: <0.01 NG/ML (ref 0–0.04)
WBC # SPEC AUTO: 5.8 X10(3)/MCL (ref 4.5–11.5)

## 2022-09-26 PROCEDURE — 84100 ASSAY OF PHOSPHORUS: CPT | Performed by: STUDENT IN AN ORGANIZED HEALTH CARE EDUCATION/TRAINING PROGRAM

## 2022-09-26 PROCEDURE — 63600175 PHARM REV CODE 636 W HCPCS: Performed by: STUDENT IN AN ORGANIZED HEALTH CARE EDUCATION/TRAINING PROGRAM

## 2022-09-26 PROCEDURE — 80053 COMPREHEN METABOLIC PANEL: CPT | Performed by: PHYSICIAN ASSISTANT

## 2022-09-26 PROCEDURE — 84484 ASSAY OF TROPONIN QUANT: CPT | Performed by: STUDENT IN AN ORGANIZED HEALTH CARE EDUCATION/TRAINING PROGRAM

## 2022-09-26 PROCEDURE — 36415 COLL VENOUS BLD VENIPUNCTURE: CPT | Performed by: PHYSICIAN ASSISTANT

## 2022-09-26 PROCEDURE — 25000003 PHARM REV CODE 250: Performed by: STUDENT IN AN ORGANIZED HEALTH CARE EDUCATION/TRAINING PROGRAM

## 2022-09-26 PROCEDURE — 83735 ASSAY OF MAGNESIUM: CPT | Performed by: STUDENT IN AN ORGANIZED HEALTH CARE EDUCATION/TRAINING PROGRAM

## 2022-09-26 PROCEDURE — 99285 EMERGENCY DEPT VISIT HI MDM: CPT | Mod: 25

## 2022-09-26 PROCEDURE — 85730 THROMBOPLASTIN TIME PARTIAL: CPT | Performed by: STUDENT IN AN ORGANIZED HEALTH CARE EDUCATION/TRAINING PROGRAM

## 2022-09-26 PROCEDURE — 83690 ASSAY OF LIPASE: CPT | Performed by: PHYSICIAN ASSISTANT

## 2022-09-26 PROCEDURE — 83880 ASSAY OF NATRIURETIC PEPTIDE: CPT | Performed by: PHYSICIAN ASSISTANT

## 2022-09-26 PROCEDURE — 84484 ASSAY OF TROPONIN QUANT: CPT | Performed by: PHYSICIAN ASSISTANT

## 2022-09-26 PROCEDURE — 21400001 HC TELEMETRY ROOM

## 2022-09-26 PROCEDURE — 25000242 PHARM REV CODE 250 ALT 637 W/ HCPCS: Performed by: FAMILY MEDICINE

## 2022-09-26 PROCEDURE — 85025 COMPLETE CBC W/AUTO DIFF WBC: CPT | Performed by: PHYSICIAN ASSISTANT

## 2022-09-26 PROCEDURE — 85610 PROTHROMBIN TIME: CPT | Performed by: STUDENT IN AN ORGANIZED HEALTH CARE EDUCATION/TRAINING PROGRAM

## 2022-09-26 PROCEDURE — 85730 THROMBOPLASTIN TIME PARTIAL: CPT | Performed by: INTERNAL MEDICINE

## 2022-09-26 PROCEDURE — 93005 ELECTROCARDIOGRAM TRACING: CPT

## 2022-09-26 PROCEDURE — 25000003 PHARM REV CODE 250: Performed by: PHYSICIAN ASSISTANT

## 2022-09-26 RX ORDER — SODIUM CHLORIDE 0.9 % (FLUSH) 0.9 %
10 SYRINGE (ML) INJECTION
Status: DISCONTINUED | OUTPATIENT
Start: 2022-09-26 | End: 2022-09-29 | Stop reason: HOSPADM

## 2022-09-26 RX ORDER — CARVEDILOL 12.5 MG/1
25 TABLET ORAL 2 TIMES DAILY
Status: DISCONTINUED | OUTPATIENT
Start: 2022-09-26 | End: 2022-09-27

## 2022-09-26 RX ORDER — ASPIRIN 81 MG/1
81 TABLET ORAL DAILY
Status: DISCONTINUED | OUTPATIENT
Start: 2022-09-27 | End: 2022-09-29 | Stop reason: HOSPADM

## 2022-09-26 RX ORDER — HEPARIN SODIUM,PORCINE/D5W 25000/250
0-40 INTRAVENOUS SOLUTION INTRAVENOUS CONTINUOUS
Status: DISCONTINUED | OUTPATIENT
Start: 2022-09-26 | End: 2022-09-29

## 2022-09-26 RX ORDER — TALC
6 POWDER (GRAM) TOPICAL NIGHTLY PRN
Status: DISCONTINUED | OUTPATIENT
Start: 2022-09-26 | End: 2022-09-29 | Stop reason: HOSPADM

## 2022-09-26 RX ORDER — HYDRALAZINE HYDROCHLORIDE 20 MG/ML
10 INJECTION INTRAMUSCULAR; INTRAVENOUS EVERY 4 HOURS PRN
Status: DISCONTINUED | OUTPATIENT
Start: 2022-09-26 | End: 2022-09-29 | Stop reason: HOSPADM

## 2022-09-26 RX ORDER — LABETALOL HCL 20 MG/4 ML
10 SYRINGE (ML) INTRAVENOUS EVERY 4 HOURS PRN
Status: DISCONTINUED | OUTPATIENT
Start: 2022-09-26 | End: 2022-09-29 | Stop reason: HOSPADM

## 2022-09-26 RX ORDER — FUROSEMIDE 20 MG/1
40 TABLET ORAL DAILY
Status: DISCONTINUED | OUTPATIENT
Start: 2022-09-26 | End: 2022-09-29 | Stop reason: HOSPADM

## 2022-09-26 RX ORDER — GABAPENTIN 300 MG/1
600 CAPSULE ORAL 3 TIMES DAILY
Refills: 1 | Status: DISCONTINUED | OUTPATIENT
Start: 2022-09-26 | End: 2022-09-29 | Stop reason: HOSPADM

## 2022-09-26 RX ORDER — ATORVASTATIN CALCIUM 40 MG/1
40 TABLET, FILM COATED ORAL DAILY
Refills: 3 | Status: DISCONTINUED | OUTPATIENT
Start: 2022-09-26 | End: 2022-09-29 | Stop reason: HOSPADM

## 2022-09-26 RX ORDER — CLOPIDOGREL BISULFATE 75 MG/1
300 TABLET ORAL ONCE
Status: DISCONTINUED | OUTPATIENT
Start: 2022-09-26 | End: 2022-09-26

## 2022-09-26 RX ORDER — ASPIRIN 325 MG
325 TABLET ORAL
Status: COMPLETED | OUTPATIENT
Start: 2022-09-26 | End: 2022-09-26

## 2022-09-26 RX ORDER — NITROGLYCERIN 0.4 MG/1
0.4 TABLET SUBLINGUAL
Status: COMPLETED | OUTPATIENT
Start: 2022-09-26 | End: 2022-09-26

## 2022-09-26 RX ORDER — HYDRALAZINE HYDROCHLORIDE 50 MG/1
50 TABLET, FILM COATED ORAL 2 TIMES DAILY
Status: DISCONTINUED | OUTPATIENT
Start: 2022-09-26 | End: 2022-09-29 | Stop reason: HOSPADM

## 2022-09-26 RX ORDER — CLOPIDOGREL BISULFATE 75 MG/1
75 TABLET ORAL ONCE
Status: COMPLETED | OUTPATIENT
Start: 2022-09-26 | End: 2022-09-26

## 2022-09-26 RX ORDER — AMLODIPINE BESYLATE 10 MG/1
10 TABLET ORAL DAILY
Status: DISCONTINUED | OUTPATIENT
Start: 2022-09-26 | End: 2022-09-29 | Stop reason: HOSPADM

## 2022-09-26 RX ADMIN — ASPIRIN 325 MG ORAL TABLET 325 MG: 325 PILL ORAL at 10:09

## 2022-09-26 RX ADMIN — HYDRALAZINE HYDROCHLORIDE 50 MG: 50 TABLET ORAL at 09:09

## 2022-09-26 RX ADMIN — CARVEDILOL 25 MG: 12.5 TABLET, FILM COATED ORAL at 02:09

## 2022-09-26 RX ADMIN — HYDRALAZINE HYDROCHLORIDE 50 MG: 50 TABLET ORAL at 02:09

## 2022-09-26 RX ADMIN — FUROSEMIDE 40 MG: 20 TABLET ORAL at 02:09

## 2022-09-26 RX ADMIN — HEPARIN SODIUM 12 UNITS/KG/HR: 10000 INJECTION, SOLUTION INTRAVENOUS at 05:09

## 2022-09-26 RX ADMIN — CARVEDILOL 25 MG: 12.5 TABLET, FILM COATED ORAL at 09:09

## 2022-09-26 RX ADMIN — GABAPENTIN 600 MG: 300 CAPSULE ORAL at 09:09

## 2022-09-26 RX ADMIN — AMLODIPINE BESYLATE 10 MG: 10 TABLET ORAL at 02:09

## 2022-09-26 RX ADMIN — CLOPIDOGREL 75 MG: 75 TABLET, FILM COATED ORAL at 11:09

## 2022-09-26 RX ADMIN — NITROGLYCERIN 0.4 MG: 0.4 TABLET SUBLINGUAL at 02:09

## 2022-09-26 RX ADMIN — GABAPENTIN 600 MG: 300 CAPSULE ORAL at 02:09

## 2022-09-26 RX ADMIN — ATORVASTATIN CALCIUM 40 MG: 40 TABLET, FILM COATED ORAL at 02:09

## 2022-09-26 NOTE — Clinical Note
The catheter was repositioned into the ostium   left main. An angiography was performed of the left coronary arteries. Multiple views were taken. The angiography was performed via power injection.

## 2022-09-26 NOTE — Clinical Note
44 ml of contrast were injected throughout the case. 6 mL of contrast was the total wasted during the case. 50 mL was the total amount used during the case.

## 2022-09-26 NOTE — Clinical Note
The radial band was applied to the right radial artery. 13 cc's of air were inserted into the closure device. Fingers warm to touch. Cap refill < 3 sec. Move fingers w/ out any pain or discomfort.

## 2022-09-26 NOTE — ED PROVIDER NOTES
"Encounter Date: 9/26/2022       History     Chief Complaint   Patient presents with    Chest Pain     Chest pain several days, SOB, hx of CHF, EKG done in triage.      Fernanda Singh is a 62 y.o. female with a PMHx of CHF, DM, HTN, GERD who presents to the ED complaining of chest pain. Pain is an "aching" type pain in the center of her chest that has been intermittent since Saturday. She originally thought it was GERD so she tried taking OTC medications with no improvement. Pain occurs both at rest and with exertion and is associated with nausea. Current episode subsided prior to ED arrival. She denies any headache, vision changes, diaphoresis, SOB, cough, leg swelling. She reports having a similar episode approximately 5 years ago and was told it was GERD. She had an abnormal nuclear stress 5 years ago, but she cannot remember what it showed. She reports a strong family history of heart disease on both sides. Current every day smoker.     The history is provided by the patient. No  was used.   Review of patient's allergies indicates:   Allergen Reactions    Lisinopril Swelling     Other reaction(s): Angioedema of lips     Past Medical History:   Diagnosis Date    Cancer 2018    CHF (congestive heart failure) 2012    Diabetes mellitus 2019    Disorder of kidney and ureter 2013    GERD (gastroesophageal reflux disease) 2013    Hypertension 2011    Sleep apnea 2013     Past Surgical History:   Procedure Laterality Date    BILATERAL TUBAL LIGATION      Biopsy Gastrointestinal  11/13/2019    [id number]    CERVICAL CONIZATION   W/ LASER  05/01/2018    COLONOSCOPY  05/10/2019    COLONOSCOPY  09/24/2014    DILATION AND CURETTAGE OF UTERUS  05/01/2018    ESOPHAGOGASTRODUODENOSCOPY  09/24/2014    ESOPHAGOGASTRODUODENOSCOPY  11/13/2019    Left breast small mass removal      Myomectomy Hysteroscopic  05/01/2018    POLYPECTOMY  05/10/2019    " POLYPECTOMY  09/24/2014    TUBAL LIGATION       Family History   Problem Relation Age of Onset    Hypertension Mother     Heart failure Mother     Kidney failure Mother     Heart attack Father     Hypertension Father     Diabetes Father     Leukemia Brother     Cirrhosis Brother     Hypertension Brother     Esophageal cancer Brother      Social History     Tobacco Use    Smoking status: Every Day     Packs/day: 0.25     Years: 15.00     Pack years: 3.75     Types: Cigarettes    Smokeless tobacco: Never   Substance Use Topics    Alcohol use: Not Currently     Alcohol/week: 1.0 standard drink     Types: 1 Glasses of wine per week     Comment: monthly    Drug use: Not Currently     Review of Systems   Constitutional:  Negative for chills and diaphoresis.   HENT:  Negative for congestion and rhinorrhea.    Eyes:  Negative for photophobia and visual disturbance.   Respiratory:  Negative for cough and shortness of breath.    Cardiovascular:  Positive for chest pain. Negative for leg swelling.   Gastrointestinal:  Positive for nausea. Negative for abdominal pain and vomiting.   Genitourinary:  Negative for dysuria and frequency.   Musculoskeletal:  Negative for arthralgias and back pain.   Skin:  Negative for rash and wound.   Neurological:  Negative for dizziness and headaches.   Psychiatric/Behavioral:  Negative for agitation and confusion.      Physical Exam     Initial Vitals [09/26/22 0952]   BP Pulse Resp Temp SpO2   (!) 182/100 79 18 98 °F (36.7 °C) 100 %      MAP       --         Physical Exam    Vitals reviewed.  Constitutional: She appears well-developed and well-nourished. No distress.   HENT:   Head: Normocephalic and atraumatic.   Mouth/Throat: No oropharyngeal exudate.   Eyes: EOM are normal. No scleral icterus.   Neck: Neck supple.   Normal range of motion.  Cardiovascular:  Normal rate and regular rhythm.           No murmur heard.  Pulmonary/Chest: No respiratory distress. She has no wheezes.   Abdominal:  Abdomen is soft. Bowel sounds are normal. She exhibits no distension.   Musculoskeletal:         General: No tenderness or edema. Normal range of motion.      Cervical back: Normal range of motion and neck supple.     Neurological: She is alert and oriented to person, place, and time. No cranial nerve deficit.   Skin: Skin is warm and dry. Capillary refill takes less than 2 seconds. No erythema.   Psychiatric: She has a normal mood and affect. Thought content normal.       ED Course   Procedures  Labs Reviewed   COMPREHENSIVE METABOLIC PANEL - Abnormal; Notable for the following components:       Result Value    Glucose Level 239 (*)     Globulin 3.9 (*)     Albumin/Globulin Ratio 0.9 (*)     All other components within normal limits   CBC WITH DIFFERENTIAL - Abnormal; Notable for the following components:    MCH 25.3 (*)     MCHC 31.3 (*)     MPV 11.2 (*)     All other components within normal limits   TROPONIN I - Normal   B-TYPE NATRIURETIC PEPTIDE - Normal   LIPASE - Normal   CBC W/ AUTO DIFFERENTIAL    Narrative:     The following orders were created for panel order CBC auto differential.  Procedure                               Abnormality         Status                     ---------                               -----------         ------                     CBC with Differential[[id number]]        Abnormal            Final result                 Please view results for these tests on the individual orders.   TROPONIN I   EXTRA TUBES    Narrative:     The following orders were created for panel order EXTRA TUBES.  Procedure                               Abnormality         Status                     ---------                               -----------         ------                     Light Blue Top Hold[[id number]]                              In process                 Gold Top Hold[[id number]]                                    In process                   Please view results for these tests on the individual  orders.   LIGHT BLUE TOP HOLD   GOLD TOP HOLD     EKG Readings: (Independently Interpreted)   Initial Reading: No STEMI. Rhythm: Sinus Bradycardia.   TWI in lead III and AVF. Stable in comparison to previous EKG.     Imaging Results              X-Ray Chest AP Portable (Final result)  Result time 09/26/22 11:38:45      Final result by Tj Dean MD (09/26/22 11:38:45)                   Narrative:    EXAMINATION  XR CHEST AP PORTABLE    CLINICAL HISTORY  Chest Pain;    TECHNIQUE  A total of 1 frontal view(s) of the chest.    COMPARISON  11 December 2019    FINDINGS  Lines/tubes/devices: Right chest wall power-injectable subcutaneous port and associated catheter remain in similar position.    The cardiomediastinal silhouette and central pulmonary vasculature are unremarkable for utilized technique.  The trachea is midline. There is no lobar consolidation, pleural effusion, or pneumothorax.    There is no acute osseous or extrathoracic abnormality.    IMPRESSION  No convincing acute radiographic abnormality.      Electronically signed by: Tj Dean  Date:    09/26/2022  Time:    11:38                                     Medications   aspirin tablet 325 mg (325 mg Oral Given 9/26/22 1056)                 ED Course as of 09/26/22 1341   Mon Sep 26, 2022   1340 HEART score of 4. Discussed with Dr. Ordaz. Hospital medicine consulted for admission and further workup. [KD]      ED Course User Index  [KD] Roxanna Mora PA-C                 Clinical Impression:   Final diagnoses:  [R07.9] Chest pain, unspecified type (Primary)             Roxanna Mora PA-C  09/26/22 1341

## 2022-09-26 NOTE — Clinical Note
The catheter was repositioned into the left ventricle. Hemodynamics were performed.  and Pullback was recorded.

## 2022-09-26 NOTE — Clinical Note
The catheter was repositioned into the ostium   right coronary artery. An angiography was performed of the right coronary arteries. Multiple views were taken. The angiography was performed via power injection.

## 2022-09-27 LAB
ALBUMIN SERPL-MCNC: 3.5 GM/DL (ref 3.4–4.8)
ALBUMIN/GLOB SERPL: 1 RATIO (ref 1.1–2)
ALP SERPL-CCNC: 114 UNIT/L (ref 40–150)
ALT SERPL-CCNC: 18 UNIT/L (ref 0–55)
APTT PPP: 37.1 SECONDS
APTT PPP: 51.7 SECONDS
APTT PPP: 51.8 SECONDS
AST SERPL-CCNC: 11 UNIT/L (ref 5–34)
BASOPHILS # BLD AUTO: 0.03 X10(3)/MCL (ref 0–0.2)
BASOPHILS NFR BLD AUTO: 0.4 %
BILIRUBIN DIRECT+TOT PNL SERPL-MCNC: 0.5 MG/DL
BUN SERPL-MCNC: 13.9 MG/DL (ref 9.8–20.1)
CALCIUM SERPL-MCNC: 9.7 MG/DL (ref 8.4–10.2)
CHLORIDE SERPL-SCNC: 104 MMOL/L (ref 98–107)
CO2 SERPL-SCNC: 25 MMOL/L (ref 23–31)
CREAT SERPL-MCNC: 0.88 MG/DL (ref 0.55–1.02)
CV STRESS BASE HR: 67 BPM
DIASTOLIC BLOOD PRESSURE: 85 MMHG
EOSINOPHIL # BLD AUTO: 0.19 X10(3)/MCL (ref 0–0.9)
EOSINOPHIL NFR BLD AUTO: 2.4 %
ERYTHROCYTE [DISTWIDTH] IN BLOOD BY AUTOMATED COUNT: 15.7 % (ref 11.5–17)
EST. AVERAGE GLUCOSE BLD GHB EST-MCNC: 174.3 MG/DL
GFR SERPLBLD CREATININE-BSD FMLA CKD-EPI: >60 MLS/MIN/1.73/M2
GLOBULIN SER-MCNC: 3.6 GM/DL (ref 2.4–3.5)
GLUCOSE SERPL-MCNC: 259 MG/DL (ref 82–115)
HBA1C MFR BLD: 7.7 %
HCT VFR BLD AUTO: 37.1 % (ref 37–47)
HGB BLD-MCNC: 11.3 GM/DL (ref 12–16)
IMM GRANULOCYTES # BLD AUTO: 0.02 X10(3)/MCL (ref 0–0.04)
IMM GRANULOCYTES NFR BLD AUTO: 0.3 %
LYMPHOCYTES # BLD AUTO: 3.41 X10(3)/MCL (ref 0.6–4.6)
LYMPHOCYTES NFR BLD AUTO: 43.6 %
MAGNESIUM SERPL-MCNC: 1.7 MG/DL (ref 1.6–2.6)
MCH RBC QN AUTO: 25.3 PG (ref 27–31)
MCHC RBC AUTO-ENTMCNC: 30.5 MG/DL (ref 33–36)
MCV RBC AUTO: 83 FL (ref 80–94)
MONOCYTES # BLD AUTO: 0.56 X10(3)/MCL (ref 0.1–1.3)
MONOCYTES NFR BLD AUTO: 7.2 %
NEUTROPHILS # BLD AUTO: 3.6 X10(3)/MCL (ref 2.1–9.2)
NEUTROPHILS NFR BLD AUTO: 46.1 %
NRBC BLD AUTO-RTO: 0 %
NUC REST DIASTOLIC VOLUME INDEX: 116
NUC REST EJECTION FRACTION: 59
NUC REST SYSTOLIC VOLUME INDEX: 47
NUC STRESS DIASTOLIC VOLUME INDEX: 105
NUC STRESS EJECTION FRACTION: 50 %
NUC STRESS SYSTOLIC VOLUME INDEX: 52
OHS CV CPX 85 PERCENT MAX PREDICTED HEART RATE MALE: 130
OHS CV CPX ESTIMATED METS: 2
OHS CV CPX MAX PREDICTED HEART RATE: 153
OHS CV CPX PATIENT IS FEMALE: 1
OHS CV CPX PATIENT IS MALE: 0
OHS CV CPX PEAK DIASTOLIC BLOOD PRESSURE: 85 MMHG
OHS CV CPX PEAK HEAR RATE: 105 BPM
OHS CV CPX PEAK RATE PRESSURE PRODUCT: NORMAL
OHS CV CPX PEAK SYSTOLIC BLOOD PRESSURE: 159 MMHG
OHS CV CPX PERCENT MAX PREDICTED HEART RATE ACHIEVED: 69
OHS CV CPX RATE PRESSURE PRODUCT PRESENTING: NORMAL
PHOSPHATE SERPL-MCNC: 3.3 MG/DL (ref 2.3–4.7)
PLATELET # BLD AUTO: 224 X10(3)/MCL (ref 130–400)
PMV BLD AUTO: 10.8 FL (ref 7.4–10.4)
POCT GLUCOSE: 186 MG/DL (ref 70–110)
POCT GLUCOSE: 206 MG/DL (ref 70–110)
POCT GLUCOSE: 241 MG/DL (ref 70–110)
POTASSIUM SERPL-SCNC: 3.5 MMOL/L (ref 3.5–5.1)
PROT SERPL-MCNC: 7.1 GM/DL (ref 5.8–7.6)
RBC # BLD AUTO: 4.47 X10(6)/MCL (ref 4.2–5.4)
SODIUM SERPL-SCNC: 139 MMOL/L (ref 136–145)
STRESS ECHO POST EXERCISE DUR MIN: 3 MINUTES
STRESS ECHO POST EXERCISE DUR SEC: 4 SECONDS
SYSTOLIC BLOOD PRESSURE: 159 MMHG
WBC # SPEC AUTO: 7.8 X10(3)/MCL (ref 4.5–11.5)

## 2022-09-27 PROCEDURE — 36415 COLL VENOUS BLD VENIPUNCTURE: CPT | Performed by: STUDENT IN AN ORGANIZED HEALTH CARE EDUCATION/TRAINING PROGRAM

## 2022-09-27 PROCEDURE — 83735 ASSAY OF MAGNESIUM: CPT | Performed by: STUDENT IN AN ORGANIZED HEALTH CARE EDUCATION/TRAINING PROGRAM

## 2022-09-27 PROCEDURE — 63600175 PHARM REV CODE 636 W HCPCS: Performed by: INTERNAL MEDICINE

## 2022-09-27 PROCEDURE — 25000003 PHARM REV CODE 250: Performed by: STUDENT IN AN ORGANIZED HEALTH CARE EDUCATION/TRAINING PROGRAM

## 2022-09-27 PROCEDURE — 84100 ASSAY OF PHOSPHORUS: CPT | Performed by: STUDENT IN AN ORGANIZED HEALTH CARE EDUCATION/TRAINING PROGRAM

## 2022-09-27 PROCEDURE — 63600175 PHARM REV CODE 636 W HCPCS: Performed by: STUDENT IN AN ORGANIZED HEALTH CARE EDUCATION/TRAINING PROGRAM

## 2022-09-27 PROCEDURE — 83036 HEMOGLOBIN GLYCOSYLATED A1C: CPT | Performed by: STUDENT IN AN ORGANIZED HEALTH CARE EDUCATION/TRAINING PROGRAM

## 2022-09-27 PROCEDURE — 94761 N-INVAS EAR/PLS OXIMETRY MLT: CPT

## 2022-09-27 PROCEDURE — 21400001 HC TELEMETRY ROOM

## 2022-09-27 PROCEDURE — 85025 COMPLETE CBC W/AUTO DIFF WBC: CPT | Performed by: STUDENT IN AN ORGANIZED HEALTH CARE EDUCATION/TRAINING PROGRAM

## 2022-09-27 PROCEDURE — 85730 THROMBOPLASTIN TIME PARTIAL: CPT | Performed by: STUDENT IN AN ORGANIZED HEALTH CARE EDUCATION/TRAINING PROGRAM

## 2022-09-27 PROCEDURE — 85730 THROMBOPLASTIN TIME PARTIAL: CPT | Performed by: INTERNAL MEDICINE

## 2022-09-27 PROCEDURE — 80053 COMPREHEN METABOLIC PANEL: CPT | Performed by: STUDENT IN AN ORGANIZED HEALTH CARE EDUCATION/TRAINING PROGRAM

## 2022-09-27 RX ORDER — REGADENOSON 0.08 MG/ML
INJECTION, SOLUTION INTRAVENOUS
Status: DISPENSED
Start: 2022-09-27 | End: 2022-09-27

## 2022-09-27 RX ORDER — MAGNESIUM SULFATE HEPTAHYDRATE 40 MG/ML
2 INJECTION, SOLUTION INTRAVENOUS ONCE
Status: COMPLETED | OUTPATIENT
Start: 2022-09-27 | End: 2022-09-27

## 2022-09-27 RX ORDER — DEXTROSE MONOHYDRATE 100 MG/ML
12.5 INJECTION, SOLUTION INTRAVENOUS
Status: DISCONTINUED | OUTPATIENT
Start: 2022-09-27 | End: 2022-09-29 | Stop reason: HOSPADM

## 2022-09-27 RX ORDER — INSULIN ASPART 100 [IU]/ML
5 INJECTION, SOLUTION INTRAVENOUS; SUBCUTANEOUS
Status: DISCONTINUED | OUTPATIENT
Start: 2022-09-27 | End: 2022-09-28

## 2022-09-27 RX ORDER — CARVEDILOL 12.5 MG/1
25 TABLET ORAL 2 TIMES DAILY
Status: DISCONTINUED | OUTPATIENT
Start: 2022-09-27 | End: 2022-09-29 | Stop reason: HOSPADM

## 2022-09-27 RX ORDER — POTASSIUM CHLORIDE 20 MEQ/1
40 TABLET, EXTENDED RELEASE ORAL ONCE
Status: COMPLETED | OUTPATIENT
Start: 2022-09-27 | End: 2022-09-27

## 2022-09-27 RX ORDER — CLOPIDOGREL BISULFATE 75 MG/1
75 TABLET ORAL DAILY
Status: DISCONTINUED | OUTPATIENT
Start: 2022-09-27 | End: 2022-09-29

## 2022-09-27 RX ORDER — DEXTROSE MONOHYDRATE 100 MG/ML
25 INJECTION, SOLUTION INTRAVENOUS
Status: DISCONTINUED | OUTPATIENT
Start: 2022-09-27 | End: 2022-09-29 | Stop reason: HOSPADM

## 2022-09-27 RX ORDER — IBUPROFEN 200 MG
16 TABLET ORAL
Status: DISCONTINUED | OUTPATIENT
Start: 2022-09-27 | End: 2022-09-29 | Stop reason: HOSPADM

## 2022-09-27 RX ORDER — INSULIN ASPART 100 [IU]/ML
0-5 INJECTION, SOLUTION INTRAVENOUS; SUBCUTANEOUS
Status: DISCONTINUED | OUTPATIENT
Start: 2022-09-27 | End: 2022-09-29 | Stop reason: HOSPADM

## 2022-09-27 RX ORDER — GLUCAGON 1 MG
1 KIT INJECTION
Status: DISCONTINUED | OUTPATIENT
Start: 2022-09-27 | End: 2022-09-29 | Stop reason: HOSPADM

## 2022-09-27 RX ORDER — IBUPROFEN 200 MG
24 TABLET ORAL
Status: DISCONTINUED | OUTPATIENT
Start: 2022-09-27 | End: 2022-09-29 | Stop reason: HOSPADM

## 2022-09-27 RX ORDER — REGADENOSON 0.08 MG/ML
0.4 INJECTION, SOLUTION INTRAVENOUS ONCE
Status: COMPLETED | OUTPATIENT
Start: 2022-09-27 | End: 2022-09-27

## 2022-09-27 RX ADMIN — ASPIRIN 81 MG: 81 TABLET, COATED ORAL at 12:09

## 2022-09-27 RX ADMIN — INSULIN ASPART 2 UNITS: 100 INJECTION, SOLUTION INTRAVENOUS; SUBCUTANEOUS at 05:09

## 2022-09-27 RX ADMIN — INSULIN ASPART 5 UNITS: 100 INJECTION, SOLUTION INTRAVENOUS; SUBCUTANEOUS at 12:09

## 2022-09-27 RX ADMIN — HYDRALAZINE HYDROCHLORIDE 50 MG: 50 TABLET ORAL at 12:09

## 2022-09-27 RX ADMIN — CARVEDILOL 25 MG: 12.5 TABLET, FILM COATED ORAL at 12:09

## 2022-09-27 RX ADMIN — POTASSIUM CHLORIDE 40 MEQ: 1500 TABLET, EXTENDED RELEASE ORAL at 12:09

## 2022-09-27 RX ADMIN — GABAPENTIN 600 MG: 300 CAPSULE ORAL at 04:09

## 2022-09-27 RX ADMIN — ATORVASTATIN CALCIUM 40 MG: 40 TABLET, FILM COATED ORAL at 12:09

## 2022-09-27 RX ADMIN — HEPARIN SODIUM 19 UNITS/KG/HR: 10000 INJECTION, SOLUTION INTRAVENOUS at 08:09

## 2022-09-27 RX ADMIN — REGADENOSON 0.4 MG: 0.08 INJECTION, SOLUTION INTRAVENOUS at 09:09

## 2022-09-27 RX ADMIN — GABAPENTIN 600 MG: 300 CAPSULE ORAL at 12:09

## 2022-09-27 RX ADMIN — CARVEDILOL 25 MG: 12.5 TABLET, FILM COATED ORAL at 09:09

## 2022-09-27 RX ADMIN — HEPARIN SODIUM 15 UNITS/KG/HR: 10000 INJECTION, SOLUTION INTRAVENOUS at 06:09

## 2022-09-27 RX ADMIN — HYDRALAZINE HYDROCHLORIDE 50 MG: 50 TABLET ORAL at 09:09

## 2022-09-27 RX ADMIN — INSULIN ASPART 5 UNITS: 100 INJECTION, SOLUTION INTRAVENOUS; SUBCUTANEOUS at 04:09

## 2022-09-27 RX ADMIN — AMLODIPINE BESYLATE 10 MG: 10 TABLET ORAL at 12:09

## 2022-09-27 RX ADMIN — MAGNESIUM SULFATE 2 G: 2 INJECTION INTRAVENOUS at 12:09

## 2022-09-27 RX ADMIN — HEPARIN SODIUM 15 UNITS/KG/HR: 10000 INJECTION, SOLUTION INTRAVENOUS at 01:09

## 2022-09-27 RX ADMIN — CLOPIDOGREL 75 MG: 75 TABLET, FILM COATED ORAL at 12:09

## 2022-09-27 RX ADMIN — INSULIN DETEMIR 10 UNITS: 100 INJECTION, SOLUTION SUBCUTANEOUS at 09:09

## 2022-09-27 RX ADMIN — GABAPENTIN 600 MG: 300 CAPSULE ORAL at 09:09

## 2022-09-27 RX ADMIN — FUROSEMIDE 40 MG: 20 TABLET ORAL at 12:09

## 2022-09-27 RX ADMIN — HEPARIN SODIUM 17 UNITS/KG/HR: 10000 INJECTION, SOLUTION INTRAVENOUS at 11:09

## 2022-09-27 NOTE — PROGRESS NOTES
"[facility] History & Physical Note     Resident Team: [facility] List 1  Attending Physician: Corky Mayer MD  Resident: Ciro Judge PGY 3  Intern: Adam Potter PGY 1     Date of Admit: 9/26/2022    Chief Complaint     Chest Pain (Chest pain several days, SOB, hx of CHF, EKG done in triage. )       Subjective:      History of Present Illness:  Fernanda Singh is a 62 y.o.  female who presents with a history of HTN, HLD, DM, tobacco abuse, HFpEF who presented to [facility] on 9/26/2022  with a primary complaint of acute, intermittent, "sticking" chest pain which is worse with exertion. Since Saturday she has been having intermitent CP which is worse when she exerts herself. "Sticking" in character. She's been having the pain more than 10x/day, and it has been getting progressively worse. She felt associated SOB and nausea today, so she came to the ED.    Interval Hx:  Ms. Singh has not had any CP since admission. Feels well. No SOB, CP, Abdominal pain, n/v.    Past Medical History:  Past Medical History:   Diagnosis Date    Cancer 2018    CHF (congestive heart failure) 2012    Diabetes mellitus 2019    Disorder of kidney and ureter 2013    GERD (gastroesophageal reflux disease) 2013    Hypertension 2011    Sleep apnea 2013   HLD    Past Surgical History:  Past Surgical History:   Procedure Laterality Date    BILATERAL TUBAL LIGATION      Biopsy Gastrointestinal  11/13/2019    BIOPSY OF THYROID  07/2019    Catheter Inserrtion [facility]      [id number]    CERVICAL CONIZATION   W/ LASER  05/01/2018    COLONOSCOPY  05/10/2019    COLONOSCOPY  09/24/2014    DILATION AND CURETTAGE OF UTERUS  05/01/2018    ESOPHAGOGASTRODUODENOSCOPY  09/24/2014    ESOPHAGOGASTRODUODENOSCOPY  11/13/2019    Left breast small mass removal      Myomectomy Hysteroscopic  05/01/2018    POLYPECTOMY  05/10/2019    POLYPECTOMY  09/24/2014    TUBAL LIGATION         Family History:  Family History   Problem Relation Age of Onset    " Hypertension Mother     Heart failure Mother     Kidney failure Mother     Heart attack Father     Hypertension Father     Diabetes Father     Leukemia Brother     Cirrhosis Brother     Hypertension Brother     Esophageal cancer Brother        Social History:  Social History     Tobacco Use    Smoking status: Every Day     Packs/day: 0.25     Years: 15.00     Pack years: 3.75     Types: Cigarettes    Smokeless tobacco: Never   Substance Use Topics    Alcohol use: Not Currently     Alcohol/week: 1.0 standard drink     Types: 1 Glasses of wine per week     Comment: monthly    Drug use: Not Currently       Allergies:  Review of patient's allergies indicates:   Allergen Reactions    Lisinopril Swelling     Other reaction(s): Angioedema of lips       Home Medications: I personally reviewed the below meds with the patient. She does not take baclofen, bentyl, or phenergan  Prior to Admission medications    Medication Sig Start Date End Date Taking? Authorizing Provider   amLODIPine (NORVASC) 10 MG tablet Take 1 tablet (10 mg total) by mouth once daily. 7/27/22 7/27/23 Yes MARCOS Carroll   aspirin (ECOTRIN) 81 MG EC tablet Take 1 tablet (81 mg total) by mouth once daily. 7/27/22 7/27/23 Yes MARCOS Carroll   carvediloL (COREG) 25 MG tablet Take 1 tablet (25 mg total) by mouth 2 (two) times a day. 7/27/22 7/27/23 Yes MARCOS Carroll   furosemide (LASIX) 40 MG tablet Take 1 tablet (40 mg total) by mouth once daily. 7/27/22 7/27/23 Yes MARCOS Carroll   gabapentin (NEURONTIN) 600 MG tablet Take 1 tablet (600 mg total) by mouth 3 (three) times daily. 5/11/22 9/26/22 Yes MARICHUY Leonardo   glipiZIDE (GLUCOTROL) 10 MG tablet Take 1 tablet (10 mg total) by mouth daily with breakfast. 5/11/22 9/26/22 Yes MARICHUY Leonardo   hydrALAZINE (APRESOLINE) 50 MG tablet Take 1 tablet (50 mg total) by mouth 2 (two) times daily. 7/27/22 7/27/23 Yes MARCOS Carroll   rosuvastatin (CRESTOR) 10  "MG tablet Take 1 tablet (10 mg total) by mouth every evening. 22 Yes MARCOS Carroll   baclofen (LIORESAL) 10 MG tablet Take 1 tablet (10 mg total) by mouth 3 (three) times daily as needed (muscle spasm). 22  MARICHUY Leonardo   buPROPion (WELLBUTRIN SR) 150 MG TBSR 12 hr tablet Take 150 mg by mouth 2 (two) times daily.  22  Historical Provider   dicyclomine (BENTYL) 20 mg tablet Take 20 mg by mouth once daily at 6am. 3/8/22 9/26/22  Historical Provider   promethazine (PHENERGAN) 12.5 MG Tab Take 12.5 mg by mouth every 6 (six) hours as needed. 3/8/22 9/26/22  Historical Provider         Review of Systems:  As above         Objective:   Last 24 Hour Vital Signs:  BP  Min: 120/77  Max: 159/85  Temp  Av.2 °F (36.8 °C)  Min: 97.7 °F (36.5 °C)  Max: 98.7 °F (37.1 °C)  Pulse  Av.7  Min: 65  Max: 69  Resp  Av.3  Min: 16  Max: 20  SpO2  Av.6 %  Min: 95 %  Max: 97 %  Height  Av' 8" (172.7 cm)  Min: 5' 8" (172.7 cm)  Max: 5' 8" (172.7 cm)  Weight  Av.9 kg (290 lb 12.6 oz)  Min: 131.9 kg (290 lb 12.6 oz)  Max: 131.9 kg (290 lb 12.6 oz)  Body mass index is 44.21 kg/m².  I/O last 3 completed shifts:  In: 829.8 [P.O.:780; I.V.:9.8; IV Piggyback:40]  Out: -     Physical Examination:  General - Appears comfortable, appropriatley conversive   Mental Status - alert and oriented x 3, speaking in logical, relevant sentences   HEENT - no rhinorrhea   Cardiac - RRR, no murmurs, rubs, or gallops; no edema in LE   Respiratory - breathing comfortably; clear to ascultation bilaterally   Abdominal - nondistended, soft, nontender to palpation   Extremities - LE, UE, and joints are nonerythematous and nonswollen   Skin - no rashes or bruises seen on skin      Laboratory:  Most Recent Data:  CBC:   Lab Results   Component Value Date    WBC 7.8 2022    HGB 11.3 (L) 2022    HCT 37.1 2022     2022    MCV 83.0 2022    RDW 15.7 2022 "     WBC Differential:   Recent Labs   Lab 09/26/22  1036 09/27/22  0405   WBC 5.8 7.8   HGB 12.0 11.3*   HCT 38.4 37.1    224   MCV 80.8 83.0     BMP:   Lab Results   Component Value Date     09/27/2022    K 3.5 09/27/2022    CO2 25 09/27/2022    BUN 13.9 09/27/2022    CREATININE 0.88 09/27/2022    CALCIUM 9.7 09/27/2022    MG 1.70 09/27/2022    PHOS 3.3 09/27/2022     LFTs:   Lab Results   Component Value Date    ALBUMIN 3.5 09/27/2022    BILITOT 0.5 09/27/2022    AST 11 09/27/2022    ALKPHOS 114 09/27/2022    ALT 18 09/27/2022     Coags:   Lab Results   Component Value Date    INR 0.94 09/26/2022    PROTIME 12.4 09/26/2022    PTT 51.7 09/27/2022     FLP:   Lab Results   Component Value Date    CHOL 115 04/13/2022    HDL 45 04/13/2022    TRIG 72 04/13/2022     DM:   Lab Results   Component Value Date    HGBA1C 7.7 (H) 09/27/2022    HGBA1C 6.4 04/13/2022    HGBA1C 5.7 10/13/2021    CREATININE 0.88 09/27/2022     Thyroid:   Lab Results   Component Value Date    TSH 0.7531 04/13/2022      Anemia: No results found for: IRON, TIBC, FERRITIN, SATURATEDIRO  No results found for: UXGJSRSV81  No results found for: FOLATE     Cardiac:   Lab Results   Component Value Date    TROPONINI <0.010 09/26/2022    BNP 20.7 09/26/2022     Urinalysis:   Lab Results   Component Value Date    COLORU Yellow 04/13/2022    PHUA 6.0 11/20/2019    NITRITE Negative 04/13/2022    KETONESU Negative 04/13/2022    UROBILINOGEN Normal 04/13/2022    WBCUA 0-5 04/13/2022       Trended Lab Data:  Recent Labs   Lab 09/26/22  1036 09/27/22  0405   WBC 5.8 7.8   HGB 12.0 11.3*   HCT 38.4 37.1    224   MCV 80.8 83.0   RDW 15.7 15.7    139   K 4.0 3.5   CO2 27 25   BUN 11.8 13.9   CREATININE 0.97 0.88   ALBUMIN 3.7 3.5   BILITOT 0.5 0.5   AST 11 11   ALKPHOS 121 114   ALT 19 18       Trended Cardiac Data:  Recent Labs   Lab 09/26/22  1036 09/26/22  1323 09/26/22  1745   TROPONINI <0.010 <0.010 <0.010   BNP 20.7  --   --         Microbiology Data:  Microbiology Results (last 7 days)       ** No results found for the last 168 hours. **             Radiology:  Imaging Results              X-Ray Chest AP Portable (Final result)  Result time 09/26/22 11:38:45      Final result by Tj Dean MD (09/26/22 11:38:45)                   Narrative:    EXAMINATION  XR CHEST AP PORTABLE    CLINICAL HISTORY  Chest Pain;    TECHNIQUE  A total of 1 frontal view(s) of the chest.    COMPARISON  11 December 2019    FINDINGS  Lines/tubes/devices: Right chest wall power-injectable subcutaneous port and associated catheter remain in similar position.    The cardiomediastinal silhouette and central pulmonary vasculature are unremarkable for utilized technique.  The trachea is midline. There is no lobar consolidation, pleural effusion, or pneumothorax.    There is no acute osseous or extrathoracic abnormality.    IMPRESSION  No convincing acute radiographic abnormality.      Electronically signed by: Tj Dean  Date:    09/26/2022  Time:    11:38                                         Assessment & Plan:     Unstable Angina  -has hx HTN, HLD, DM, tobacco use  -[facility] 9/2016 - mild CAD in mid LAD (30% diffuse and eccentric, arthersclerotic, mildly tortuous lesion)  -start heparin gtt, ASA, Plavix  -continue home Coreg  -Atorvastatin 40mg daily  -Got Lexiscan today - pending results  -TTE w/ concentric hypertrophy of LV but otherwise normal  -Cardiology consulted. Pending recs  -Well's score 0    Hypertension  -continue home BP meds    HLD  -atorvastatin 40mg daily  -LDL well-controlled for several years    DM 2  -low-dose ssi  -Adding detemir 10U nightly + Novolog 5 U TIDWM  -A1C is >7, above goal    HFpEF  -continue home PO furosemide 40mg daily      CODE STATUS: Full  Access: PIV  Antibiotics: None  Diet: NPO after midnight  DVT Prophylaxis: Heparin gtt  GI Prophylaxis: None  Fluids: None      Disposition: Inpatient workup/tx for unstable  angina      Ciro Judge MD  [facility] [id number]

## 2022-09-27 NOTE — H&P
"[facility] Medicine Wards History & Physical Note     Resident Team: [facility] Medicine List 1  Attending Physician: Corky Mayer MD  Resident: Ciro Judge PGY 3  Intern: Adam Potter PGY 1     Date of Admit: 9/26/2022    Chief Complaint     Chest Pain (Chest pain several days, SOB, hx of CHF, EKG done in triage. )       Subjective:      History of Present Illness:  Fernanda Singh is a 62 y.o.  female who presents with a history of HTN, HLD, DM, tobacco abuse, HFpEF who presented to [facility] on 9/26/2022  with a primary complaint of acute, intermittent, "sticking" chest pain which is worse with exertion. Since Saturday she has been having intermitent CP which is worse when she exerts herself. "Sticking" in character. She's been having the pain more than 10x/day, and it has been getting progressively worse. She felt associated SOB and nausea today, so she came to the ED.      Past Medical History:  Past Medical History:   Diagnosis Date    Cancer 2018    CHF (congestive heart failure) 2012    Diabetes mellitus 2019    Disorder of kidney and ureter 2013    GERD (gastroesophageal reflux disease) 2013    Hypertension 2011    Sleep apnea 2013   HLD    Past Surgical History:  Past Surgical History:   Procedure Laterality Date    BILATERAL TUBAL LIGATION      Biopsy Gastrointestinal  11/13/2019    BIOPSY OF THYROID  07/2019    [id number]    CERVICAL CONIZATION   W/ LASER  05/01/2018    COLONOSCOPY  05/10/2019    COLONOSCOPY  09/24/2014    DILATION AND CURETTAGE OF UTERUS  05/01/2018    ESOPHAGOGASTRODUODENOSCOPY  09/24/2014    ESOPHAGOGASTRODUODENOSCOPY  11/13/2019    Left breast small mass removal      Myomectomy Hysteroscopic  05/01/2018    POLYPECTOMY  05/10/2019    POLYPECTOMY  09/24/2014    TUBAL LIGATION         Family History:  Family History   Problem Relation Age of Onset    Hypertension Mother     Heart failure Mother     Kidney failure Mother     Heart attack Father     " Hypertension Father     Diabetes Father     Leukemia Brother     Cirrhosis Brother     Hypertension Brother     Esophageal cancer Brother        Social History:  Social History     Tobacco Use    Smoking status: Every Day     Packs/day: 0.25     Years: 15.00     Pack years: 3.75     Types: Cigarettes    Smokeless tobacco: Never   Substance Use Topics    Alcohol use: Not Currently     Alcohol/week: 1.0 standard drink     Types: 1 Glasses of wine per week     Comment: monthly    Drug use: Not Currently       Allergies:  Review of patient's allergies indicates:   Allergen Reactions    Lisinopril Swelling     Other reaction(s): Angioedema of lips       Home Medications: I personally reviewed the below meds with the patient. She does not take baclofen, bentyl, or phenergan  Prior to Admission medications    Medication Sig Start Date End Date Taking? Authorizing Provider   amLODIPine (NORVASC) 10 MG tablet Take 1 tablet (10 mg total) by mouth once daily. 7/27/22 7/27/23 Yes MARCOS Carroll   aspirin (ECOTRIN) 81 MG EC tablet Take 1 tablet (81 mg total) by mouth once daily. 7/27/22 7/27/23 Yes MARCOS Carroll   carvediloL (COREG) 25 MG tablet Take 1 tablet (25 mg total) by mouth 2 (two) times a day. 7/27/22 7/27/23 Yes MARCOS Carroll   furosemide (LASIX) 40 MG tablet Take 1 tablet (40 mg total) by mouth once daily. 7/27/22 7/27/23 Yes MARCOS Carroll   gabapentin (NEURONTIN) 600 MG tablet Take 1 tablet (600 mg total) by mouth 3 (three) times daily. 5/11/22 9/26/22 Yes MARICHUY Leonardo   glipiZIDE (GLUCOTROL) 10 MG tablet Take 1 tablet (10 mg total) by mouth daily with breakfast. 5/11/22 9/26/22 Yes MARICHUY Leonardo   hydrALAZINE (APRESOLINE) 50 MG tablet Take 1 tablet (50 mg total) by mouth 2 (two) times daily. 7/27/22 7/27/23 Yes MARCOS Carroll   rosuvastatin (CRESTOR) 10 MG tablet Take 1 tablet (10 mg total) by mouth every evening. 7/27/22 7/27/23 Yes [place] HERNÁNDEZ  "MARCOS Coates   baclofen (LIORESAL) 10 MG tablet Take 1 tablet (10 mg total) by mouth 3 (three) times daily as needed (muscle spasm). 22  MARICHUY Leonardo   buPROPion (WELLBUTRIN SR) 150 MG TBSR 12 hr tablet Take 150 mg by mouth 2 (two) times daily.  22  Historical Provider   dicyclomine (BENTYL) 20 mg tablet Take 20 mg by mouth once daily at 6am. 3/8/22 9/26/22  Historical Provider   promethazine (PHENERGAN) 12.5 MG Tab Take 12.5 mg by mouth every 6 (six) hours as needed. 3/8/22 9/26/22  Historical Provider         Review of Systems:  As above         Objective:   Last 24 Hour Vital Signs:  BP  Min: 123/79  Max: 182/100  Temp  Av °F (36.7 °C)  Min: 97.7 °F (36.5 °C)  Max: 98.4 °F (36.9 °C)  Pulse  Av.1  Min: 55  Max: 79  Resp  Av.5  Min: 11  Max: 21  SpO2  Av.6 %  Min: 97 %  Max: 100 %  Height  Av' 8" (172.7 cm)  Min: 5' 8" (172.7 cm)  Max: 5' 8" (172.7 cm)  Weight  Av.8 kg (290 lb 9.9 oz)  Min: 131.5 kg (290 lb)  Max: 131.9 kg (290 lb 14.4 oz)  Body mass index is 44.21 kg/m².  I/O last 3 completed shifts:  In: 529.8 [P.O.:480; I.V.:9.8; IV Piggyback:40]  Out: -     Physical Examination:  General - Appears comfortable, appropriatley conversive   Mental Status - alert and oriented x 3, speaking in logical, relevant sentences   HEENT - no rhinorrhea   Cardiac - RRR, no murmurs, rubs, or gallops; no edema in LE   Respiratory - breathing comfortably; clear to ascultation bilaterally   Abdominal - nondistended, soft, nontender to palpation   Extremities - LE, UE, and joints are nonerythematous and nonswollen   Skin - no rashes or bruises seen on skin      Laboratory:  Most Recent Data:  CBC:   Lab Results   Component Value Date    WBC 5.8 2022    HGB 12.0 2022    HCT 38.4 2022     2022    MCV 80.8 2022    RDW 15.7 2022     WBC Differential:   Recent Labs   Lab 22  1036   WBC 5.8   HGB 12.0   HCT 38.4      MCV 80.8 "     BMP:   Lab Results   Component Value Date     09/26/2022    K 4.0 09/26/2022    CO2 27 09/26/2022    BUN 11.8 09/26/2022    CREATININE 0.97 09/26/2022    CALCIUM 9.7 09/26/2022    MG 1.80 09/26/2022    PHOS 2.6 09/26/2022     LFTs:   Lab Results   Component Value Date    ALBUMIN 3.7 09/26/2022    BILITOT 0.5 09/26/2022    AST 11 09/26/2022    ALKPHOS 121 09/26/2022    ALT 19 09/26/2022     Coags:   Lab Results   Component Value Date    INR 0.94 09/26/2022    PROTIME 12.4 09/26/2022    PTT 29.1 09/26/2022     FLP:   Lab Results   Component Value Date    CHOL 115 04/13/2022    HDL 45 04/13/2022    TRIG 72 04/13/2022     DM:   Lab Results   Component Value Date    HGBA1C 6.4 04/13/2022    HGBA1C 5.7 10/13/2021    HGBA1C 6.8 04/08/2021    CREATININE 0.97 09/26/2022     Thyroid:   Lab Results   Component Value Date    TSH 0.7531 04/13/2022      Anemia: No results found for: IRON, TIBC, FERRITIN, SATURATEDIRO  No results found for: [id number]  No results found for: FOLATE     Cardiac:   Lab Results   Component Value Date    TROPONINI <0.010 09/26/2022    BNP 20.7 09/26/2022     Urinalysis:   Lab Results   Component Value Date    COLORU Yellow 04/13/2022    PHUA 6.0 11/20/2019    NITRITE Negative 04/13/2022    KETONESU Negative 04/13/2022    UROBILINOGEN Normal 04/13/2022    WBCUA 0-5 04/13/2022       Trended Lab Data:  Recent Labs   Lab 09/26/22  1036   WBC 5.8   HGB 12.0   HCT 38.4      MCV 80.8   RDW 15.7      K 4.0   CO2 27   BUN 11.8   CREATININE 0.97   ALBUMIN 3.7   BILITOT 0.5   AST 11   ALKPHOS 121   ALT 19       Trended Cardiac Data:  Recent Labs   Lab 09/26/22  1036 09/26/22  1323 09/26/22  1745   TROPONINI <0.010 <0.010 <0.010   BNP 20.7  --   --        Microbiology Data:  Microbiology Results (last 7 days)       ** No results found for the last 168 hours. **             Radiology:  Imaging Results              X-Ray Chest AP Portable (Final result)  Result time 09/26/22 11:38:45       Final result by Tj Dean MD (09/26/22 11:38:45)                   Narrative:    EXAMINATION  XR CHEST AP PORTABLE    CLINICAL HISTORY  Chest Pain;    TECHNIQUE  A total of 1 frontal view(s) of the chest.    COMPARISON  11 December 2019    FINDINGS  Lines/tubes/devices: Right chest wall power-injectable subcutaneous port and associated catheter remain in similar position.    The cardiomediastinal silhouette and central pulmonary vasculature are unremarkable for utilized technique.  The trachea is midline. There is no lobar consolidation, pleural effusion, or pneumothorax.    There is no acute osseous or extrathoracic abnormality.    IMPRESSION  No convincing acute radiographic abnormality.      Electronically signed by: Tj Dean  Date:    09/26/2022  Time:    11:38                                         Assessment & Plan:     Unstable Angina  -has hx HTN, HLD, DM, tobacco use  -[facility] 9/2016 - mild CAD in mid LAD (30% diffuse and eccentric, arthersclerotic, mildly tortuous lesion)  -start heparin gtt, ASA, Plavix  -continue home Coreg, statin  -NPO after midnight  -TTE and Stress test ordered  -Well's score 0    Hypertension  -She did not take her home BP meds this morning  -will restart home BP meds    HLD  -continue home statin    DM 2  -low-dose ssi  -will hold off on long acting insulin since she's NPO    HFpEF  -continue home PO furosemide 40mg daily      CODE STATUS: Full  Access: PIV  Antibiotics: None  Diet: NPO after midnight  DVT Prophylaxis: Heparin gtt  GI Prophylaxis: None  Fluids: None      Disposition: Inpatient workup/tx for unstable angina          Ciro Judge MD  [facility] HO-3

## 2022-09-27 NOTE — PROGRESS NOTES
"Inpatient Nutrition Evaluation    Admit Date: 2022   Total duration of encounter: 1 day    Nutrition Recommendation/Prescription   Will change diet to cardiac /diabetic diet (hx DM)  Pt education on diet/complete   MVI/fe  Biweekly wt  Will monitor nutrition status       Nutrition Assessment     Chart Review    Reason Seen: continuous nutrition monitoring    Diagnosis:  Unstable angina, HTN, HLD, DM, CHF     Relevant Medical History: HTN, HLD, DM< CHF     Nutrition-Related Medications: aspirin, atorvastatin, furosemide     Nutrition-Related Labs:  () H/H 11.3/37.1 Gluc 259(H) Bun 13.9 Cr 0.88 K 3.5 P 3.3     Diet Order: Diet heart healthy  Oral Supplement Order: none at this time  Appetite/Oral Intake: good/% of meals  Factors Affecting Nutritional Intake: none identified at this time  Food/Muslim/Cultural Preferences: none reported       Wound(s):   none    Comments    () Pt reported good appetite; no N/V; wt stable; on diuretic for CHF/fluid. Will change diet to card/diabetic; hx dm. Pt education on diet provided.     Anthropometrics    Height: 5' 8" (172.7 cm) Height Method: Stated  Last Weight: 131.9 kg (290 lb 12.6 oz) (22 1729) Weight Method: Bed Scale  BMI (Calculated): 44.2  BMI Classification: obese grade III (BMI >/=40)     Ideal Body Weight (IBW), Female: 140 lb     % Ideal Body Weight, Female (lb): 207.71 %                    Usual Body Weight (UBW), k.9 kg  % Usual Body Weight: 100.21     Usual Weight Provided By: patient    Wt Readings from Last 5 Encounters:   22 131.9 kg (290 lb 12.6 oz)   22 130 kg (286 lb 9.6 oz)   22 129.2 kg (284 lb 12.8 oz)   22 131.1 kg (289 lb)   22 132.5 kg (292 lb)     Weight Change(s) Since Admission:  Admit Weight: 131.9 kg (290 lb 14.4 oz) (22 0952)      Patient Education    Education Provided: diabetic diet and heart healthy diet  Teaching Method: explanation and printed materials  Comprehension: " verbalizes understanding  Barriers to Learning: none evident  Expected Compliance: good  Comments: All questions were answered and dietitian's contact information was provided.     Monitoring & Evaluation     Dietitian will monitor food and beverage intake, weight, and food/nutrition knowledge skill.  Nutrition Risk/Follow-Up: low (follow-up in 5-7 days)  Patients assigned 'low nutrition risk' status do not qualify for a full nutritional assessment but will be monitored and re-evaluated in a 5-7 day time period.

## 2022-09-28 LAB
ALBUMIN SERPL-MCNC: 3.5 GM/DL (ref 3.4–4.8)
ALBUMIN/GLOB SERPL: 1 RATIO (ref 1.1–2)
ALP SERPL-CCNC: 122 UNIT/L (ref 40–150)
ALT SERPL-CCNC: 17 UNIT/L (ref 0–55)
APTT PPP: 95.3 SECONDS
APTT PPP: 96.6 SECONDS
AST SERPL-CCNC: 12 UNIT/L (ref 5–34)
BASOPHILS # BLD AUTO: 0.02 X10(3)/MCL (ref 0–0.2)
BASOPHILS NFR BLD AUTO: 0.2 %
BILIRUBIN DIRECT+TOT PNL SERPL-MCNC: 0.3 MG/DL
BUN SERPL-MCNC: 14.8 MG/DL (ref 9.8–20.1)
CALCIUM SERPL-MCNC: 9.5 MG/DL (ref 8.4–10.2)
CHLORIDE SERPL-SCNC: 104 MMOL/L (ref 98–107)
CO2 SERPL-SCNC: 24 MMOL/L (ref 23–31)
CREAT SERPL-MCNC: 0.93 MG/DL (ref 0.55–1.02)
EOSINOPHIL # BLD AUTO: 0.23 X10(3)/MCL (ref 0–0.9)
EOSINOPHIL NFR BLD AUTO: 2.7 %
ERYTHROCYTE [DISTWIDTH] IN BLOOD BY AUTOMATED COUNT: 15.7 % (ref 11.5–17)
GFR SERPLBLD CREATININE-BSD FMLA CKD-EPI: >60 MLS/MIN/1.73/M2
GLOBULIN SER-MCNC: 3.5 GM/DL (ref 2.4–3.5)
GLUCOSE SERPL-MCNC: 300 MG/DL (ref 82–115)
HCT VFR BLD AUTO: 35.3 % (ref 37–47)
HGB BLD-MCNC: 10.8 GM/DL (ref 12–16)
IMM GRANULOCYTES # BLD AUTO: 0.02 X10(3)/MCL (ref 0–0.04)
IMM GRANULOCYTES NFR BLD AUTO: 0.2 %
LYMPHOCYTES # BLD AUTO: 3.85 X10(3)/MCL (ref 0.6–4.6)
LYMPHOCYTES NFR BLD AUTO: 45.7 %
MAGNESIUM SERPL-MCNC: 1.8 MG/DL (ref 1.6–2.6)
MCH RBC QN AUTO: 25.3 PG (ref 27–31)
MCHC RBC AUTO-ENTMCNC: 30.6 MG/DL (ref 33–36)
MCV RBC AUTO: 82.7 FL (ref 80–94)
MONOCYTES # BLD AUTO: 0.65 X10(3)/MCL (ref 0.1–1.3)
MONOCYTES NFR BLD AUTO: 7.7 %
NEUTROPHILS # BLD AUTO: 3.7 X10(3)/MCL (ref 2.1–9.2)
NEUTROPHILS NFR BLD AUTO: 43.5 %
NRBC BLD AUTO-RTO: 0 %
PHOSPHATE SERPL-MCNC: 3 MG/DL (ref 2.3–4.7)
PLATELET # BLD AUTO: 228 X10(3)/MCL (ref 130–400)
PMV BLD AUTO: 10.8 FL (ref 7.4–10.4)
POCT GLUCOSE: 101 MG/DL (ref 70–110)
POCT GLUCOSE: 199 MG/DL (ref 70–110)
POCT GLUCOSE: 233 MG/DL (ref 70–110)
POCT GLUCOSE: 276 MG/DL (ref 70–110)
POTASSIUM SERPL-SCNC: 3.9 MMOL/L (ref 3.5–5.1)
PROT SERPL-MCNC: 7 GM/DL (ref 5.8–7.6)
RBC # BLD AUTO: 4.27 X10(6)/MCL (ref 4.2–5.4)
SODIUM SERPL-SCNC: 138 MMOL/L (ref 136–145)
WBC # SPEC AUTO: 8.4 X10(3)/MCL (ref 4.5–11.5)

## 2022-09-28 PROCEDURE — 21400001 HC TELEMETRY ROOM

## 2022-09-28 PROCEDURE — 80053 COMPREHEN METABOLIC PANEL: CPT | Performed by: STUDENT IN AN ORGANIZED HEALTH CARE EDUCATION/TRAINING PROGRAM

## 2022-09-28 PROCEDURE — 63600175 PHARM REV CODE 636 W HCPCS

## 2022-09-28 PROCEDURE — 84100 ASSAY OF PHOSPHORUS: CPT | Performed by: STUDENT IN AN ORGANIZED HEALTH CARE EDUCATION/TRAINING PROGRAM

## 2022-09-28 PROCEDURE — 94761 N-INVAS EAR/PLS OXIMETRY MLT: CPT

## 2022-09-28 PROCEDURE — 36415 COLL VENOUS BLD VENIPUNCTURE: CPT | Performed by: STUDENT IN AN ORGANIZED HEALTH CARE EDUCATION/TRAINING PROGRAM

## 2022-09-28 PROCEDURE — 99153 MOD SED SAME PHYS/QHP EA: CPT | Performed by: INTERNAL MEDICINE

## 2022-09-28 PROCEDURE — 25000003 PHARM REV CODE 250: Performed by: NURSE PRACTITIONER

## 2022-09-28 PROCEDURE — C1769 GUIDE WIRE: HCPCS | Performed by: INTERNAL MEDICINE

## 2022-09-28 PROCEDURE — 63600175 PHARM REV CODE 636 W HCPCS: Performed by: INTERNAL MEDICINE

## 2022-09-28 PROCEDURE — C1887 CATHETER, GUIDING: HCPCS | Performed by: INTERNAL MEDICINE

## 2022-09-28 PROCEDURE — 25000003 PHARM REV CODE 250: Performed by: STUDENT IN AN ORGANIZED HEALTH CARE EDUCATION/TRAINING PROGRAM

## 2022-09-28 PROCEDURE — 93458 L HRT ARTERY/VENTRICLE ANGIO: CPT | Performed by: INTERNAL MEDICINE

## 2022-09-28 PROCEDURE — 25500020 PHARM REV CODE 255: Performed by: INTERNAL MEDICINE

## 2022-09-28 PROCEDURE — 85025 COMPLETE CBC W/AUTO DIFF WBC: CPT | Performed by: STUDENT IN AN ORGANIZED HEALTH CARE EDUCATION/TRAINING PROGRAM

## 2022-09-28 PROCEDURE — 63600175 PHARM REV CODE 636 W HCPCS: Performed by: STUDENT IN AN ORGANIZED HEALTH CARE EDUCATION/TRAINING PROGRAM

## 2022-09-28 PROCEDURE — 25000003 PHARM REV CODE 250: Performed by: INTERNAL MEDICINE

## 2022-09-28 PROCEDURE — 83735 ASSAY OF MAGNESIUM: CPT | Performed by: STUDENT IN AN ORGANIZED HEALTH CARE EDUCATION/TRAINING PROGRAM

## 2022-09-28 PROCEDURE — 63600175 PHARM REV CODE 636 W HCPCS: Performed by: NURSE PRACTITIONER

## 2022-09-28 PROCEDURE — C1894 INTRO/SHEATH, NON-LASER: HCPCS | Performed by: INTERNAL MEDICINE

## 2022-09-28 PROCEDURE — 99152 MOD SED SAME PHYS/QHP 5/>YRS: CPT | Performed by: INTERNAL MEDICINE

## 2022-09-28 PROCEDURE — 85730 THROMBOPLASTIN TIME PARTIAL: CPT | Performed by: STUDENT IN AN ORGANIZED HEALTH CARE EDUCATION/TRAINING PROGRAM

## 2022-09-28 RX ORDER — ACETAMINOPHEN 325 MG/1
650 TABLET ORAL EVERY 4 HOURS PRN
Status: DISCONTINUED | OUTPATIENT
Start: 2022-09-28 | End: 2022-09-29 | Stop reason: HOSPADM

## 2022-09-28 RX ORDER — SODIUM CHLORIDE 0.9 % (FLUSH) 0.9 %
10 SYRINGE (ML) INJECTION
Status: DISCONTINUED | OUTPATIENT
Start: 2022-09-28 | End: 2022-09-29 | Stop reason: HOSPADM

## 2022-09-28 RX ORDER — FENTANYL CITRATE 50 UG/ML
INJECTION, SOLUTION INTRAMUSCULAR; INTRAVENOUS
Status: DISCONTINUED | OUTPATIENT
Start: 2022-09-28 | End: 2022-09-29 | Stop reason: HOSPADM

## 2022-09-28 RX ORDER — HEPARIN SODIUM 5000 [USP'U]/ML
INJECTION, SOLUTION INTRAVENOUS; SUBCUTANEOUS
Status: DISCONTINUED | OUTPATIENT
Start: 2022-09-28 | End: 2022-09-29 | Stop reason: HOSPADM

## 2022-09-28 RX ORDER — SODIUM CHLORIDE 9 MG/ML
INJECTION, SOLUTION INTRAVENOUS CONTINUOUS
Status: ACTIVE | OUTPATIENT
Start: 2022-09-28 | End: 2022-09-28

## 2022-09-28 RX ORDER — MIDAZOLAM HYDROCHLORIDE 5 MG/ML
INJECTION INTRAMUSCULAR; INTRAVENOUS
Status: DISCONTINUED | OUTPATIENT
Start: 2022-09-28 | End: 2022-09-29 | Stop reason: HOSPADM

## 2022-09-28 RX ORDER — NITROGLYCERIN 20 MG/100ML
INJECTION INTRAVENOUS
Status: DISCONTINUED | OUTPATIENT
Start: 2022-09-28 | End: 2022-09-29 | Stop reason: HOSPADM

## 2022-09-28 RX ORDER — ONDANSETRON 4 MG/1
8 TABLET, ORALLY DISINTEGRATING ORAL EVERY 8 HOURS PRN
Status: DISCONTINUED | OUTPATIENT
Start: 2022-09-28 | End: 2022-09-29 | Stop reason: HOSPADM

## 2022-09-28 RX ORDER — DIPHENHYDRAMINE HYDROCHLORIDE 50 MG/ML
INJECTION INTRAMUSCULAR; INTRAVENOUS
Status: DISCONTINUED | OUTPATIENT
Start: 2022-09-28 | End: 2022-09-29 | Stop reason: HOSPADM

## 2022-09-28 RX ORDER — NITROGLYCERIN 0.4 MG/1
0.4 TABLET SUBLINGUAL EVERY 5 MIN PRN
Status: DISCONTINUED | OUTPATIENT
Start: 2022-09-28 | End: 2022-09-29 | Stop reason: HOSPADM

## 2022-09-28 RX ORDER — LIDOCAINE HYDROCHLORIDE 10 MG/ML
INJECTION INFILTRATION; PERINEURAL
Status: DISCONTINUED | OUTPATIENT
Start: 2022-09-28 | End: 2022-09-29 | Stop reason: HOSPADM

## 2022-09-28 RX ORDER — LOSARTAN POTASSIUM 25 MG/1
25 TABLET ORAL DAILY
Status: DISCONTINUED | OUTPATIENT
Start: 2022-09-28 | End: 2022-09-29

## 2022-09-28 RX ORDER — INSULIN ASPART 100 [IU]/ML
8 INJECTION, SOLUTION INTRAVENOUS; SUBCUTANEOUS
Status: DISCONTINUED | OUTPATIENT
Start: 2022-09-28 | End: 2022-09-29 | Stop reason: HOSPADM

## 2022-09-28 RX ORDER — SODIUM CHLORIDE 9 MG/ML
INJECTION, SOLUTION INTRAVENOUS ONCE
Status: COMPLETED | OUTPATIENT
Start: 2022-09-28 | End: 2022-09-28

## 2022-09-28 RX ADMIN — CARVEDILOL 25 MG: 12.5 TABLET, FILM COATED ORAL at 08:09

## 2022-09-28 RX ADMIN — HYDRALAZINE HYDROCHLORIDE 50 MG: 50 TABLET ORAL at 08:09

## 2022-09-28 RX ADMIN — GABAPENTIN 600 MG: 300 CAPSULE ORAL at 08:09

## 2022-09-28 RX ADMIN — ASPIRIN 81 MG: 81 TABLET, COATED ORAL at 08:09

## 2022-09-28 RX ADMIN — GABAPENTIN 600 MG: 300 CAPSULE ORAL at 03:09

## 2022-09-28 RX ADMIN — CLOPIDOGREL 75 MG: 75 TABLET, FILM COATED ORAL at 08:09

## 2022-09-28 RX ADMIN — ATORVASTATIN CALCIUM 40 MG: 40 TABLET, FILM COATED ORAL at 08:09

## 2022-09-28 RX ADMIN — INSULIN DETEMIR 15 UNITS: 100 INJECTION, SOLUTION SUBCUTANEOUS at 08:09

## 2022-09-28 RX ADMIN — FUROSEMIDE 40 MG: 20 TABLET ORAL at 08:09

## 2022-09-28 RX ADMIN — INSULIN ASPART 8 UNITS: 100 INJECTION, SOLUTION INTRAVENOUS; SUBCUTANEOUS at 11:09

## 2022-09-28 RX ADMIN — SODIUM CHLORIDE: 9 INJECTION, SOLUTION INTRAVENOUS at 03:09

## 2022-09-28 RX ADMIN — INSULIN ASPART 1 UNITS: 100 INJECTION, SOLUTION INTRAVENOUS; SUBCUTANEOUS at 08:09

## 2022-09-28 RX ADMIN — AMLODIPINE BESYLATE 10 MG: 10 TABLET ORAL at 08:09

## 2022-09-28 RX ADMIN — SODIUM CHLORIDE: 9 INJECTION, SOLUTION INTRAVENOUS at 06:09

## 2022-09-28 RX ADMIN — LOSARTAN POTASSIUM 25 MG: 25 TABLET, FILM COATED ORAL at 03:09

## 2022-09-28 RX ADMIN — INSULIN ASPART 5 UNITS: 100 INJECTION, SOLUTION INTRAVENOUS; SUBCUTANEOUS at 08:09

## 2022-09-28 NOTE — INTERVAL H&P NOTE
The patient has been examined and the H&P has been reviewed:    I concur with the findings and no changes have occurred since H&P was written.    Procedure risks, benefits and alternative options discussed and understood by patient/family.          Active Hospital Problems    Diagnosis  POA    *Unstable angina [I20.0]  Unknown     Priority: Low      Resolved Hospital Problems   No resolved problems to display.

## 2022-09-28 NOTE — PLAN OF CARE
09/28/22 1528   Discharge Assessment   Confirmed/corrected address, phone number and insurance Yes   Confirmed Demographics Correct on Facesheet   Source of Information patient   When was your last doctors appointment?   (Oumar Bundy)   Reason For Admission Unstable angina, NSTEMI, Chest pain   Lives With spouse   Facility Arrived From: Home   Do you expect to return to your current living situation? Yes   Do you have help at home or someone to help you manage your care at home? Yes   Who are your caregiver(s) and their phone number(s)? Jude Singh ([phone])   Prior to hospitilization cognitive status: Alert/Oriented   Current cognitive status: Alert/Oriented   Walking or Climbing Stairs Difficulty none   Dressing/Bathing Difficulty none   Equipment Currently Used at Home none  (RW available-not used)   Patient currently being followed by outpatient case management? No   Do you currently have service(s) that help you manage your care at home? No   Do you take prescription medications? Yes  ([facility])   Do you have prescription coverage? Yes   Coverage BC/BS Advantage   Do you have any problems affording any of your prescribed medications? No   Is the patient taking medications as prescribed? yes   Who is going to help you get home at discharge? Family   How do you get to doctors appointments? car, drives self   Are you on dialysis? No   Discharge Plan A Home with family   DME Needed Upon Discharge  none   Discharge Plan discussed with: Patient   Discharge Barriers Identified None   Physical Activity   On average, how many days per week do you engage in moderate to strenuous exercise (like a brisk walk)? 0 days   On average, how many minutes do you engage in exercise at this level? 0 min   Financial Resource Strain   How hard is it for you to pay for the very basics like food, housing, medical care, and heating? Somewhat  ([place] referral submitted for resource assistance)   Housing Stability   In  the last 12 months, was there a time when you were not able to pay the mortgage or rent on time? N   In the last 12 months, how many places have you lived? 1   In the last 12 months, was there a time when you did not have a steady place to sleep or slept in a shelter (including now)? N   Transportation Needs   In the past 12 months, has lack of transportation kept you from medical appointments or from getting medications? no   In the past 12 months, has lack of transportation kept you from meetings, work, or from getting things needed for daily living? No   Food Insecurity   Within the past 12 months, you worried that your food would run out before you got the money to buy more. Never true   Within the past 12 months, the food you bought just didn't last and you didn't have money to get more. Never true   Stress   Do you feel stress - tense, restless, nervous, or anxious, or unable to sleep at night because your mind is troubled all the time - these days? Not at all   Social Connections   In a typical week, how many times do you talk on the phone with family, friends, or neighbors? More than 3   How often do you get together with friends or relatives? More than 3   How often do you attend Rastafarian or Yarsanism services? Never  ([employer])   Do you belong to any clubs or organizations such as Rastafarian groups, unions, fraternal or athletic groups, or school groups? Yes   How often do you attend meetings of the clubs or organizations you belong to? Never   Are you , , , , never , or living with a partner?    Alcohol Use   Q1: How often do you have a drink containing alcohol? Monthly or l   Q2: How many drinks containing alcohol do you have on a typical day when you are drinking? 1 or 2   Q3: How often do you have six or more drinks on one occasion? Never   Relationship/Environment   Name(s) of Who Lives With Patient Jude Singh ([phone])

## 2022-09-28 NOTE — CONSULTS
"Cardiology Consultation    Date of Consultation: 9/27/20222    Consultation Requested By: Dr. Judge    Reason for Consultation: Unstable Angina    History of Present Illness:  This is a 62 y.o. female with T2DM (A1c 7.7), HTN, HFpEF, and tobacco abuse who presented with chest pain. The pain was acute in onset. It occurs intermittently. It is made worse with exertion and typically relieved with rest. Yesterday it started at rest and was associated with radiation to the jaw which prompted her presentation as this was an atypical feature. She previously described it to other providers as "sticking." She denied associated symptoms on our interview but yesterday reported dyspnea and nausea.     In the ED she was hypertensive 160-180/80-90. Her labs are unremarkable including 3 negative troponins. Her ECG demonstrated NSR with inferior ST depression and LAD. She had an NMST which showed a moderate intensity, small sized, reversible perfusion abnormality that is consistent with ischemia in the distal anteroseptal and apical wall(s) in the typical distribution of the LAD territory. Her TTE proved unremarkable.     Review of patient's allergies indicates:   Allergen Reactions    Lisinopril Swelling     Other reaction(s): Angioedema of lips       Review of systems: 12 point review of systems conducted, negative except as stated in the HPI.     Past Medical History:   Past Medical History:   Diagnosis Date    Cancer 2018    CHF (congestive heart failure) 2012    Diabetes mellitus 2019    Disorder of kidney and ureter 2013    GERD (gastroesophageal reflux disease) 2013    Hypertension 2011    Sleep apnea 2013       Procedure History:   Past Surgical History:   Procedure Laterality Date    BILATERAL TUBAL LIGATION      Biopsy Gastrointestinal  11/13/2019    BIOPSY OF THYROID  07/2019    Catheter Inserrtion [facility]      [id number]    CERVICAL CONIZATION   W/ LASER  05/01/2018    COLONOSCOPY  05/10/2019    COLONOSCOPY  09/24/2014    " DILATION AND CURETTAGE OF UTERUS  05/01/2018    ESOPHAGOGASTRODUODENOSCOPY  09/24/2014    ESOPHAGOGASTRODUODENOSCOPY  11/13/2019    Left breast small mass removal      Myomectomy Hysteroscopic  05/01/2018    POLYPECTOMY  05/10/2019    POLYPECTOMY  09/24/2014    TUBAL LIGATION         Family History: family history includes Cirrhosis in her brother; Diabetes in her father; Esophageal cancer in her brother; Heart attack in her father; Heart failure in her mother; Hypertension in her brother, father, and mother; Kidney failure in her mother; Leukemia in her brother.    Social History:  reports that she has been smoking cigarettes. She has a 3.75 pack-year smoking history. She has never used smokeless tobacco. She reports that she does not currently use alcohol after a past usage of about 1.0 standard drink per week. She reports that she does not currently use drugs.    Home Medications:   Medications Prior to Admission   Medication Sig Dispense Refill Last Dose    amLODIPine (NORVASC) 10 MG tablet Take 1 tablet (10 mg total) by mouth once daily. 90 tablet 3     aspirin (ECOTRIN) 81 MG EC tablet Take 1 tablet (81 mg total) by mouth once daily. 90 tablet 3     carvediloL (COREG) 25 MG tablet Take 1 tablet (25 mg total) by mouth 2 (two) times a day. 180 tablet 3     furosemide (LASIX) 40 MG tablet Take 1 tablet (40 mg total) by mouth once daily. 90 tablet 3     gabapentin (NEURONTIN) 600 MG tablet Take 1 tablet (600 mg total) by mouth 3 (three) times daily. 270 tablet 1     glipiZIDE (GLUCOTROL) 10 MG tablet Take 1 tablet (10 mg total) by mouth daily with breakfast. 90 tablet 1     hydrALAZINE (APRESOLINE) 50 MG tablet Take 1 tablet (50 mg total) by mouth 2 (two) times daily. 180 tablet 3     rosuvastatin (CRESTOR) 10 MG tablet Take 1 tablet (10 mg total) by mouth every evening. 90 tablet 3     baclofen (LIORESAL) 10 MG tablet Take 1 tablet (10 mg total) by mouth 3 (three) times daily as needed (muscle spasm). 30 tablet 1         Inpatient Medications:     Current Facility-Administered Medications:     amLODIPine tablet 10 mg, 10 mg, Oral, Daily, Ciro Judge MD, 10 mg at 09/27/22 1221    aspirin EC tablet 81 mg, 81 mg, Oral, Daily, Ciro Judge MD, 81 mg at 09/27/22 1222    atorvastatin tablet 40 mg, 40 mg, Oral, Daily, Ciro Judge MD, 40 mg at 09/27/22 1221    carvediloL tablet 25 mg, 25 mg, Oral, BID, Ciro Judge MD, 25 mg at 09/27/22 1221    clopidogreL tablet 75 mg, 75 mg, Oral, Daily, Ciro Judge MD, 75 mg at 09/27/22 1221    dextrose 10 % infusion, 12.5 g, Intravenous, PRN, Ciro Judge MD    dextrose 10 % infusion, 25 g, Intravenous, PRN, Ciro Judge MD    furosemide tablet 40 mg, 40 mg, Oral, Daily, Ciro Judge MD, 40 mg at 09/27/22 1222    gabapentin capsule 600 mg, 600 mg, Oral, TID, Ciro Judge MD, 600 mg at 09/27/22 1624    glucagon (human recombinant) injection 1 mg, 1 mg, Intramuscular, PRN, Ciro Judge MD    glucose chewable tablet 16 g, 16 g, Oral, PRN, Ciro Judge MD    glucose chewable tablet 24 g, 24 g, Oral, PRN, Ciro Judge MD    heparin 25,000 units in dextrose 5% (100 units/ml) IV bolus from bag - ADDITIONAL PRN BOLUS - 30 units/kg (max bolus 4000 units), 30 Units/kg (Adjusted), Intravenous, PRNCiro MD    heparin 25,000 units in dextrose 5% (100 units/ml) IV bolus from bag - ADDITIONAL PRN BOLUS - 60 units/kg (max bolus 4000 units), 43.9 Units/kg (Adjusted), Intravenous, PRN, Ciro Judge MD, 2,730 Units at 09/27/22 1122    heparin 25,000 units in dextrose 5% 250 mL (100 units/mL) infusion LOW INTENSITY nomogram - LAF, 0-40 Units/kg/hr (Adjusted), Intravenous, Continuous, Ciro Judge MD, Last Rate: 15.5 mL/hr at 09/27/22 1121, 17 Units/kg/hr at 09/27/22 1121    hydrALAZINE injection 10 mg, 10 mg, Intravenous, Q4H PRN, Ciro Judge MD    hydrALAZINE  "tablet 50 mg, 50 mg, Oral, BID, Ciro Judge MD, 50 mg at 09/27/22 1221    influenza (QUADRIVALENT PF) vaccine 0.5 mL, 0.5 mL, Intramuscular, vaccine x 1 dose, Corky Mayer MD    insulin aspart U-100 injection 0-5 Units, 0-5 Units, Subcutaneous, QID (AC + HS) PRN, Ciro Judge MD, 2 Units at 09/27/22 1700    insulin aspart U-100 injection 5 Units, 5 Units, Subcutaneous, TIDWM, Ciro Judge MD, 5 Units at 09/27/22 1645    insulin detemir U-100 injection 10 Units, 10 Units, Subcutaneous, QHS, Ciro Judge MD    labetalol 20 mg/4 mL (5 mg/mL) IV syring, 10 mg, Intravenous, Q4H PRN, Ciro Judge MD    melatonin tablet 6 mg, 6 mg, Oral, Nightly PRN, Ciro Judge MD    sodium chloride 0.9% flush 10 mL, 10 mL, Intravenous, PRN, Ciro Judge MD       Physical Exam:   BP (!) 117/58   Pulse 66   Temp 98.3 °F (36.8 °C)   Resp 20   Ht 5' 8" (1.727 m)   Wt 131.9 kg (290 lb 12.6 oz)   SpO2 97%   Breastfeeding No   BMI 44.21 kg/m²  Body mass index is 44.21 kg/m².  Physical Exam  Vitals reviewed.   Constitutional:       Appearance: She is obese.   HENT:      Head: Normocephalic and atraumatic.      Right Ear: External ear normal.      Left Ear: External ear normal.      Nose: Nose normal.      Mouth/Throat:      Mouth: Mucous membranes are moist.   Eyes:      Conjunctiva/sclera: Conjunctivae normal.   Cardiovascular:      Rate and Rhythm: Normal rate and regular rhythm.      Heart sounds: No murmur heard.  Pulmonary:      Effort: Pulmonary effort is normal. No respiratory distress.      Breath sounds: Normal breath sounds. No stridor. No wheezing, rhonchi or rales.   Chest:      Chest wall: No tenderness.   Abdominal:      General: Abdomen is flat. Bowel sounds are normal. There is no distension.      Palpations: Abdomen is soft.   Musculoskeletal:      Cervical back: Neck supple.      Right lower leg: No edema.      Left lower leg: No edema.   Skin:     " General: Skin is warm and dry.      Capillary Refill: Capillary refill takes less than 2 seconds.   Neurological:      Mental Status: She is alert and oriented to person, place, and time.   Psychiatric:         Mood and Affect: Mood normal.         Behavior: Behavior normal.         Laboratory Data:   Recent Results (from the past 24 hour(s))   APTT    Collection Time: 09/26/22 10:58 PM   Result Value Ref Range    PTT 37.1 <150.0 seconds   Comprehensive metabolic panel    Collection Time: 09/27/22  4:05 AM   Result Value Ref Range    Sodium Level 139 136 - 145 mmol/L    Potassium Level 3.5 3.5 - 5.1 mmol/L    Chloride 104 98 - 107 mmol/L    Carbon Dioxide 25 23 - 31 mmol/L    Glucose Level 259 (H) 82 - 115 mg/dL    Blood Urea Nitrogen 13.9 9.8 - 20.1 mg/dL    Creatinine 0.88 0.55 - 1.02 mg/dL    Calcium Level Total 9.7 8.4 - 10.2 mg/dL    Protein Total 7.1 5.8 - 7.6 gm/dL    Albumin Level 3.5 3.4 - 4.8 gm/dL    Globulin 3.6 (H) 2.4 - 3.5 gm/dL    Albumin/Globulin Ratio 1.0 (L) 1.1 - 2.0 ratio    Bilirubin Total 0.5 <=1.5 mg/dL    Alkaline Phosphatase 114 40 - 150 unit/L    Alanine Aminotransferase 18 0 - 55 unit/L    Aspartate Aminotransferase 11 5 - 34 unit/L    eGFR >60 mls/min/1.73/m2   Magnesium    Collection Time: 09/27/22  4:05 AM   Result Value Ref Range    Magnesium Level 1.70 1.60 - 2.60 mg/dL   Phosphorus    Collection Time: 09/27/22  4:05 AM   Result Value Ref Range    Phosphorus Level 3.3 2.3 - 4.7 mg/dL   CBC with Differential    Collection Time: 09/27/22  4:05 AM   Result Value Ref Range    WBC 7.8 4.5 - 11.5 x10(3)/mcL    RBC 4.47 4.20 - 5.40 x10(6)/mcL    Hgb 11.3 (L) 12.0 - 16.0 gm/dL    Hct 37.1 37.0 - 47.0 %    MCV 83.0 80.0 - 94.0 fL    MCH 25.3 (L) 27.0 - 31.0 pg    MCHC 30.5 (L) 33.0 - 36.0 mg/dL    RDW 15.7 11.5 - 17.0 %    Platelet 224 130 - 400 x10(3)/mcL    MPV 10.8 (H) 7.4 - 10.4 fL    Neut % 46.1 %    Lymph % 43.6 %    Mono % 7.2 %    Eos % 2.4 %    Basophil % 0.4 %    Lymph # 3.41 0.6 -  4.6 x10(3)/mcL    Neut # 3.6 2.1 - 9.2 x10(3)/mcL    Mono # 0.56 0.1 - 1.3 x10(3)/mcL    Eos # 0.19 0 - 0.9 x10(3)/mcL    Baso # 0.03 0 - 0.2 x10(3)/mcL    IG# 0.02 0 - 0.04 x10(3)/mcL    IG% 0.3 %    NRBC% 0.0 %   APTT    Collection Time: 09/27/22  8:11 AM   Result Value Ref Range    PTT 51.7 <150.0 seconds   Nuclear Stress - Cardiology Interpreted    Collection Time: 09/27/22 10:02 AM   Result Value Ref Range    85% Max Predicted      Max Predicted      [facility] CV CPX PATIENT IS MALE 0.0     [facility] CV CPX PATIENT IS FEMALE 1.0     HR at rest 67 bpm    Systolic blood pressure 159 mmHg    Diastolic blood pressure 85 mmHg    RPP 10,653     Exercise duration (min) 3 minutes    Exercise duration (sec) 4 seconds    Peak  bpm    Peak Systolic  mmHg    Peak Diatolic BP 85 mmHg    Peak RPP 16,695     Estimated METs 2     % Max HR Achieved 69     Nuc Rest EF 59     Nuc Rest Diastolic Volume Index 116     Nuc Rest Systolic Volume Index 47     Nuc Stress EF 50 %    Nuc Rest Diastolic Volume Index 105     Nuc Rest Systolic Volume Index 52    Hemoglobin A1c if not done in past 3 months    Collection Time: 09/27/22 10:59 AM   Result Value Ref Range    Hemoglobin A1c 7.7 (H) <=7.0 %    Estimated Average Glucose 174.3 mg/dL   POCT glucose    Collection Time: 09/27/22 12:26 PM   Result Value Ref Range    POCT Glucose 241 (H) 70 - 110 mg/dL   POCT glucose    Collection Time: 09/27/22  4:22 PM   Result Value Ref Range    POCT Glucose 206 (H) 70 - 110 mg/dL   APTT    Collection Time: 09/27/22  5:51 PM   Result Value Ref Range    PTT 51.8 <150.0 seconds       Impression:   62 y.o. female with T2DM (A1c 7.7), HTN, HFpEF, and tobacco abuse who presented with unstable angina.    Plan:   Unstable Angina  -Continue heparin GGT and DAPT for now. Will likely be able to discontinue tomorrow as low suspicion for true plaque rupture with thrombus formation but technically unstable angina is part of ACS spectrum.   -High  intensity statin.   -Blood pressure control. Angioedema with ACEI. Consider ARB. Consider MRA. Consider nitrate if chest pain is present to optimize anti-anginal regimen.   -NPO at midnight.     HTN  -Continue CCB + nonselective BB.   -Consider ARB. Consider MRA. Consider nitrate.    HFpEF  -Afterload reduction and loop diuretic for volume symptoms if needed. Consider SGLT-2 outpatient if appropriate candidate. Data in HFrEF and HFpEF.     T2DM  -Management per primary team.     Thank you very much for your consultation    Kurtis David MD  [facility]

## 2022-09-28 NOTE — OP NOTE
Brief Procedure Note:    : Kurtis David     Preoperative Diagnosis:  unstable angina    Postop Diagnosis:  No coronary stenosis    Procedure:  Coronary angiogram    Findings:No coronary disease is present  See procedure report for full details.      Complications:  No immediate complications    Disposition:  Return to inpatient room    Condition:  Stable    Kurtis David MD

## 2022-09-28 NOTE — H&P (VIEW-ONLY)
"Cardiology Consultation    Date of Consultation: 9/27/20222    Consultation Requested By: Dr. Judge    Reason for Consultation: Unstable Angina    History of Present Illness:  This is a 62 y.o. female with T2DM (A1c 7.7), HTN, HFpEF, and tobacco abuse who presented with chest pain. The pain was acute in onset. It occurs intermittently. It is made worse with exertion and typically relieved with rest. Yesterday it started at rest and was associated with radiation to the jaw which prompted her presentation as this was an atypical feature. She previously described it to other providers as "sticking." She denied associated symptoms on our interview but yesterday reported dyspnea and nausea.     In the ED she was hypertensive 160-180/80-90. Her labs are unremarkable including 3 negative troponins. Her ECG demonstrated NSR with inferior ST depression and LAD. She had an NMST which showed a moderate intensity, small sized, reversible perfusion abnormality that is consistent with ischemia in the distal anteroseptal and apical wall(s) in the typical distribution of the LAD territory. Her TTE proved unremarkable.     Review of patient's allergies indicates:   Allergen Reactions    Lisinopril Swelling     Other reaction(s): Angioedema of lips       Review of systems: 12 point review of systems conducted, negative except as stated in the HPI.     Past Medical History:   Past Medical History:   Diagnosis Date    Cancer 2018    CHF (congestive heart failure) 2012    Diabetes mellitus 2019    Disorder of kidney and ureter 2013    GERD (gastroesophageal reflux disease) 2013    Hypertension 2011    Sleep apnea 2013       Procedure History:   Past Surgical History:   Procedure Laterality Date    BILATERAL TUBAL LIGATION      Biopsy Gastrointestinal  11/13/2019    BIOPSY OF THYROID  07/2019    Catheter Inserr[facility]      [id number]    CERVICAL CONIZATION   W/ LASER  05/01/2018    COLONOSCOPY  05/10/2019    COLONOSCOPY  09/24/2014    " DILATION AND CURETTAGE OF UTERUS  05/01/2018    ESOPHAGOGASTRODUODENOSCOPY  09/24/2014    ESOPHAGOGASTRODUODENOSCOPY  11/13/2019    Left breast small mass removal      Myomectomy Hysteroscopic  05/01/2018    POLYPECTOMY  05/10/2019    POLYPECTOMY  09/24/2014    TUBAL LIGATION         Family History: family history includes Cirrhosis in her brother; Diabetes in her father; Esophageal cancer in her brother; Heart attack in her father; Heart failure in her mother; Hypertension in her brother, father, and mother; Kidney failure in her mother; Leukemia in her brother.    Social History:  reports that she has been smoking cigarettes. She has a 3.75 pack-year smoking history. She has never used smokeless tobacco. She reports that she does not currently use alcohol after a past usage of about 1.0 standard drink per week. She reports that she does not currently use drugs.    Home Medications:   Medications Prior to Admission   Medication Sig Dispense Refill Last Dose    amLODIPine (NORVASC) 10 MG tablet Take 1 tablet (10 mg total) by mouth once daily. 90 tablet 3     aspirin (ECOTRIN) 81 MG EC tablet Take 1 tablet (81 mg total) by mouth once daily. 90 tablet 3     carvediloL (COREG) 25 MG tablet Take 1 tablet (25 mg total) by mouth 2 (two) times a day. 180 tablet 3     furosemide (LASIX) 40 MG tablet Take 1 tablet (40 mg total) by mouth once daily. 90 tablet 3     gabapentin (NEURONTIN) 600 MG tablet Take 1 tablet (600 mg total) by mouth 3 (three) times daily. 270 tablet 1     glipiZIDE (GLUCOTROL) 10 MG tablet Take 1 tablet (10 mg total) by mouth daily with breakfast. 90 tablet 1     hydrALAZINE (APRESOLINE) 50 MG tablet Take 1 tablet (50 mg total) by mouth 2 (two) times daily. 180 tablet 3     rosuvastatin (CRESTOR) 10 MG tablet Take 1 tablet (10 mg total) by mouth every evening. 90 tablet 3     baclofen (LIORESAL) 10 MG tablet Take 1 tablet (10 mg total) by mouth 3 (three) times daily as needed (muscle spasm). 30 tablet 1         Inpatient Medications:     Current Facility-Administered Medications:     amLODIPine tablet 10 mg, 10 mg, Oral, Daily, Ciro Judge MD, 10 mg at 09/27/22 1221    aspirin EC tablet 81 mg, 81 mg, Oral, Daily, Ciro Judge MD, 81 mg at 09/27/22 1222    atorvastatin tablet 40 mg, 40 mg, Oral, Daily, Ciro Judge MD, 40 mg at 09/27/22 1221    carvediloL tablet 25 mg, 25 mg, Oral, BID, Ciro Judge MD, 25 mg at 09/27/22 1221    clopidogreL tablet 75 mg, 75 mg, Oral, Daily, Ciro Judge MD, 75 mg at 09/27/22 1221    dextrose 10 % infusion, 12.5 g, Intravenous, PRN, Ciro Judge MD    dextrose 10 % infusion, 25 g, Intravenous, PRN, Ciro Judge MD    furosemide tablet 40 mg, 40 mg, Oral, Daily, Ciro Judge MD, 40 mg at 09/27/22 1222    gabapentin capsule 600 mg, 600 mg, Oral, TID, Ciro Judge MD, 600 mg at 09/27/22 1624    glucagon (human recombinant) injection 1 mg, 1 mg, Intramuscular, PRN, Ciro Judge MD    glucose chewable tablet 16 g, 16 g, Oral, PRN, Ciro Judge MD    glucose chewable tablet 24 g, 24 g, Oral, PRN, Ciro Judge MD    heparin 25,000 units in dextrose 5% (100 units/ml) IV bolus from bag - ADDITIONAL PRN BOLUS - 30 units/kg (max bolus 4000 units), 30 Units/kg (Adjusted), Intravenous, PRNCiro MD    heparin 25,000 units in dextrose 5% (100 units/ml) IV bolus from bag - ADDITIONAL PRN BOLUS - 60 units/kg (max bolus 4000 units), 43.9 Units/kg (Adjusted), Intravenous, PRN, Ciro Judge MD, 2,730 Units at 09/27/22 1122    heparin 25,000 units in dextrose 5% 250 mL (100 units/mL) infusion LOW INTENSITY nomogram - LAF, 0-40 Units/kg/hr (Adjusted), Intravenous, Continuous, Ciro Judge MD, Last Rate: 15.5 mL/hr at 09/27/22 1121, 17 Units/kg/hr at 09/27/22 1121    hydrALAZINE injection 10 mg, 10 mg, Intravenous, Q4H PRN, Ciro Judge MD    hydrALAZINE  "tablet 50 mg, 50 mg, Oral, BID, Ciro Judge MD, 50 mg at 09/27/22 1221    influenza (QUADRIVALENT PF) vaccine 0.5 mL, 0.5 mL, Intramuscular, vaccine x 1 dose, Corky Mayer MD    insulin aspart U-100 injection 0-5 Units, 0-5 Units, Subcutaneous, QID (AC + HS) PRN, Ciro Judge MD, 2 Units at 09/27/22 1700    insulin aspart U-100 injection 5 Units, 5 Units, Subcutaneous, TIDWM, Ciro Judge MD, 5 Units at 09/27/22 1645    insulin detemir U-100 injection 10 Units, 10 Units, Subcutaneous, QHS, Ciro Judge MD    labetalol 20 mg/4 mL (5 mg/mL) IV syring, 10 mg, Intravenous, Q4H PRN, Ciro Judge MD    melatonin tablet 6 mg, 6 mg, Oral, Nightly PRN, Ciro Judge MD    sodium chloride 0.9% flush 10 mL, 10 mL, Intravenous, PRN, Ciro Judge MD       Physical Exam:   BP (!) 117/58   Pulse 66   Temp 98.3 °F (36.8 °C)   Resp 20   Ht 5' 8" (1.727 m)   Wt 131.9 kg (290 lb 12.6 oz)   SpO2 97%   Breastfeeding No   BMI 44.21 kg/m²  Body mass index is 44.21 kg/m².  Physical Exam  Vitals reviewed.   Constitutional:       Appearance: She is obese.   HENT:      Head: Normocephalic and atraumatic.      Right Ear: External ear normal.      Left Ear: External ear normal.      Nose: Nose normal.      Mouth/Throat:      Mouth: Mucous membranes are moist.   Eyes:      Conjunctiva/sclera: Conjunctivae normal.   Cardiovascular:      Rate and Rhythm: Normal rate and regular rhythm.      Heart sounds: No murmur heard.  Pulmonary:      Effort: Pulmonary effort is normal. No respiratory distress.      Breath sounds: Normal breath sounds. No stridor. No wheezing, rhonchi or rales.   Chest:      Chest wall: No tenderness.   Abdominal:      General: Abdomen is flat. Bowel sounds are normal. There is no distension.      Palpations: Abdomen is soft.   Musculoskeletal:      Cervical back: Neck supple.      Right lower leg: No edema.      Left lower leg: No edema.   Skin:     " General: Skin is warm and dry.      Capillary Refill: Capillary refill takes less than 2 seconds.   Neurological:      Mental Status: She is alert and oriented to person, place, and time.   Psychiatric:         Mood and Affect: Mood normal.         Behavior: Behavior normal.         Laboratory Data:   Recent Results (from the past 24 hour(s))   APTT    Collection Time: 09/26/22 10:58 PM   Result Value Ref Range    PTT 37.1 <150.0 seconds   Comprehensive metabolic panel    Collection Time: 09/27/22  4:05 AM   Result Value Ref Range    Sodium Level 139 136 - 145 mmol/L    Potassium Level 3.5 3.5 - 5.1 mmol/L    Chloride 104 98 - 107 mmol/L    Carbon Dioxide 25 23 - 31 mmol/L    Glucose Level 259 (H) 82 - 115 mg/dL    Blood Urea Nitrogen 13.9 9.8 - 20.1 mg/dL    Creatinine 0.88 0.55 - 1.02 mg/dL    Calcium Level Total 9.7 8.4 - 10.2 mg/dL    Protein Total 7.1 5.8 - 7.6 gm/dL    Albumin Level 3.5 3.4 - 4.8 gm/dL    Globulin 3.6 (H) 2.4 - 3.5 gm/dL    Albumin/Globulin Ratio 1.0 (L) 1.1 - 2.0 ratio    Bilirubin Total 0.5 <=1.5 mg/dL    Alkaline Phosphatase 114 40 - 150 unit/L    Alanine Aminotransferase 18 0 - 55 unit/L    Aspartate Aminotransferase 11 5 - 34 unit/L    eGFR >60 mls/min/1.73/m2   Magnesium    Collection Time: 09/27/22  4:05 AM   Result Value Ref Range    Magnesium Level 1.70 1.60 - 2.60 mg/dL   Phosphorus    Collection Time: 09/27/22  4:05 AM   Result Value Ref Range    Phosphorus Level 3.3 2.3 - 4.7 mg/dL   CBC with Differential    Collection Time: 09/27/22  4:05 AM   Result Value Ref Range    WBC 7.8 4.5 - 11.5 x10(3)/mcL    RBC 4.47 4.20 - 5.40 x10(6)/mcL    Hgb 11.3 (L) 12.0 - 16.0 gm/dL    Hct 37.1 37.0 - 47.0 %    MCV 83.0 80.0 - 94.0 fL    MCH 25.3 (L) 27.0 - 31.0 pg    MCHC 30.5 (L) 33.0 - 36.0 mg/dL    RDW 15.7 11.5 - 17.0 %    Platelet 224 130 - 400 x10(3)/mcL    MPV 10.8 (H) 7.4 - 10.4 fL    Neut % 46.1 %    Lymph % 43.6 %    Mono % 7.2 %    Eos % 2.4 %    Basophil % 0.4 %    Lymph # 3.41 0.6 -  4.6 x10(3)/mcL    Neut # 3.6 2.1 - 9.2 x10(3)/mcL    Mono # 0.56 0.1 - 1.3 x10(3)/mcL    Eos # 0.19 0 - 0.9 x10(3)/mcL    Baso # 0.03 0 - 0.2 x10(3)/mcL    IG# 0.02 0 - 0.04 x10(3)/mcL    IG% 0.3 %    NRBC% 0.0 %   APTT    Collection Time: 09/27/22  8:11 AM   Result Value Ref Range    PTT 51.7 <150.0 seconds   Nuclear Stress - Cardiology Interpreted    Collection Time: 09/27/22 10:02 AM   Result Value Ref Range    85% Max Predicted      Max Predicted      OHS CV CPX PATIENT IS MALE 0.0     OHS CV CPX PATIENT IS FEMALE 1.0     HR at rest 67 bpm    Systolic blood pressure 159 mmHg    Diastolic blood pressure 85 mmHg    RPP 10,653     Exercise duration (min) 3 minutes    Exercise duration (sec) 4 seconds    Peak  bpm    Peak Systolic  mmHg    Peak Diatolic BP 85 mmHg    Peak RPP 16,695     Estimated METs 2     % Max HR Achieved 69     Nuc Rest EF 59     Nuc Rest Diastolic Volume Index 116     Nuc Rest Systolic Volume Index 47     Nuc Stress EF 50 %    Nuc Rest Diastolic Volume Index 105     Nuc Rest Systolic Volume Index 52    Hemoglobin A1c if not done in past 3 months    Collection Time: 09/27/22 10:59 AM   Result Value Ref Range    Hemoglobin A1c 7.7 (H) <=7.0 %    Estimated Average Glucose 174.3 mg/dL   POCT glucose    Collection Time: 09/27/22 12:26 PM   Result Value Ref Range    POCT Glucose 241 (H) 70 - 110 mg/dL   POCT glucose    Collection Time: 09/27/22  4:22 PM   Result Value Ref Range    POCT Glucose 206 (H) 70 - 110 mg/dL   APTT    Collection Time: 09/27/22  5:51 PM   Result Value Ref Range    PTT 51.8 <150.0 seconds       Impression:   62 y.o. female with T2DM (A1c 7.7), HTN, HFpEF, and tobacco abuse who presented with unstable angina.    Plan:   Unstable Angina  -Continue heparin GGT and DAPT for now. Will likely be able to discontinue tomorrow as low suspicion for true plaque rupture with thrombus formation but technically unstable angina is part of ACS spectrum.   -High  intensity statin.   -Blood pressure control. Angioedema with ACEI. Consider ARB. Consider MRA. Consider nitrate if chest pain is present to optimize anti-anginal regimen.   -NPO at midnight.     HTN  -Continue CCB + nonselective BB.   -Consider ARB. Consider MRA. Consider nitrate.    HFpEF  -Afterload reduction and loop diuretic for volume symptoms if needed. Consider SGLT-2 outpatient if appropriate candidate. Data in HFrEF and HFpEF.     T2DM  -Management per primary team.     Thank you very much for your consultation    Kurtis David MD  [facility]

## 2022-09-28 NOTE — PROGRESS NOTES
"[facility] Medicine Wards History & Physical Note     Resident Team: [facility] Medicine List 1  Attending Physician: Corky Mayer MD  Resident: Karuna Vigil MD HO2  Intern: Dari Medina MD HO1    Date of Admit: 9/26/2022  Day of Inpatient: Day 2    Chief Complaint     Chest Pain (Chest pain several days, SOB, hx of CHF, EKG done in triage. )       Subjective:      History of Present Illness:  Fernanda Singh is a 62 y.o.  female who presents with a history of HTN, HLD, DM, tobacco abuse, HFpEF who presented to [facility] on 9/26/2022  with a primary complaint of acute, intermittent, "sticking" chest pain which is worse with exertion. Since Saturday she has been having intermitent CP which is worse when she exerts herself. "Sticking" in character. She's been having the pain more than 10x/day, and it has been getting progressively worse. She felt associated SOB and nausea today, so she came to the ED.    Interval Hx:  Ms. Singh has not had any CP since admission. Feels well. No SOB, CP, Abdominal pain, n/v. Currently NPO awaiting further workup, coronary angiogram per cardiology consult yesterday.     Past Medical History:  Past Medical History:   Diagnosis Date    Cancer 2018    CHF (congestive heart failure) 2012    Diabetes mellitus 2019    Disorder of kidney and ureter 2013    GERD (gastroesophageal reflux disease) 2013    Hypertension 2011    Sleep apnea 2013   HLD    Past Surgical History:  Past Surgical History:   Procedure Laterality Date    BILATERAL TUBAL LIGATION      Biopsy Gastrointestinal  11/13/2019    [facility] [place]      [id number]    CERVICAL CONIZATION   W/ LASER  05/01/2018    COLONOSCOPY  05/10/2019    COLONOSCOPY  09/24/2014    DILATION AND CURETTAGE OF UTERUS  05/01/2018    ESOPHAGOGASTRODUODENOSCOPY  09/24/2014    ESOPHAGOGASTRODUODENOSCOPY  11/13/2019    Left breast small mass removal      Myomectomy Hysteroscopic  05/01/2018    POLYPECTOMY  " 05/10/2019    POLYPECTOMY  09/24/2014    TUBAL LIGATION         Family History:  Family History   Problem Relation Age of Onset    Hypertension Mother     Heart failure Mother     Kidney failure Mother     Heart attack Father     Hypertension Father     Diabetes Father     Leukemia Brother     Cirrhosis Brother     Hypertension Brother     Esophageal cancer Brother        Social History:  Social History     Tobacco Use    Smoking status: Every Day     Packs/day: 0.25     Years: 15.00     Pack years: 3.75     Types: Cigarettes    Smokeless tobacco: Never   Substance Use Topics    Alcohol use: Not Currently     Alcohol/week: 1.0 standard drink     Types: 1 Glasses of wine per week     Comment: monthly    Drug use: Not Currently       Allergies:  Review of patient's allergies indicates:   Allergen Reactions    Lisinopril Swelling     Other reaction(s): Angioedema of lips       Home Medications: I personally reviewed the below meds with the patient. She does not take baclofen, bentyl, or phenergan  Prior to Admission medications    Medication Sig Start Date End Date Taking? Authorizing Provider   amLODIPine (NORVASC) 10 MG tablet Take 1 tablet (10 mg total) by mouth once daily. 7/27/22 7/27/23 Yes MARCOS Carroll   aspirin (ECOTRIN) 81 MG EC tablet Take 1 tablet (81 mg total) by mouth once daily. 7/27/22 7/27/23 Yes MARCOS Carroll   carvediloL (COREG) 25 MG tablet Take 1 tablet (25 mg total) by mouth 2 (two) times a day. 7/27/22 7/27/23 Yes MARCOS Carroll   furosemide (LASIX) 40 MG tablet Take 1 tablet (40 mg total) by mouth once daily. 7/27/22 7/27/23 Yes MARCOS Carroll   gabapentin (NEURONTIN) 600 MG tablet Take 1 tablet (600 mg total) by mouth 3 (three) times daily. 5/11/22 9/26/22 Yes MARICHUY Leonardo   glipiZIDE (GLUCOTROL) 10 MG tablet Take 1 tablet (10 mg total) by mouth daily with breakfast. 5/11/22 9/26/22 Yes MARICHUY Leonardo   hydrALAZINE (APRESOLINE) 50 MG tablet  Take 1 tablet (50 mg total) by mouth 2 (two) times daily. 22 Yes MARCOS Carroll   rosuvastatin (CRESTOR) 10 MG tablet Take 1 tablet (10 mg total) by mouth every evening. 22 Yes MARCOS Carroll   baclofen (LIORESAL) 10 MG tablet Take 1 tablet (10 mg total) by mouth 3 (three) times daily as needed (muscle spasm). 22  MARICHUY Leonardo   buPROPion (WELLBUTRIN SR) 150 MG TBSR 12 hr tablet Take 150 mg by mouth 2 (two) times daily.  22  Historical Provider   dicyclomine (BENTYL) 20 mg tablet Take 20 mg by mouth once daily at 6am. 3/8/22 9/26/22  Historical Provider   promethazine (PHENERGAN) 12.5 MG Tab Take 12.5 mg by mouth every 6 (six) hours as needed. 3/8/22 9/26/22  Historical Provider         Review of Systems:  As above         Objective:   Last 24 Hour Vital Signs:  BP  Min: 124/76  Max: 153/77  Temp  Av.2 °F (36.8 °C)  Min: 97.7 °F (36.5 °C)  Max: 98.6 °F (37 °C)  Pulse  Av.8  Min: 62  Max: 73  Resp  Av  Min: 18  Max: 18  SpO2  Av.5 %  Min: 95 %  Max: 100 %  Body mass index is 44.21 kg/m².  I/O last 3 completed shifts:  In: 600 [P.O.:600]  Out: -     Physical Examination:  General - Appears comfortable, appropriatley conversive   Mental Status - alert and oriented x 3, speaking in logical, relevant sentences   HEENT - no rhinorrhea   Cardiac - RRR, no murmurs, rubs, or gallops; no edema in LE   Respiratory - breathing comfortably; clear to ascultation bilaterally   Abdominal - nondistended, soft, nontender to palpation   Extremities - LE, UE, and joints are nonerythematous and nonswollen   Skin - no rashes or bruises seen on skin      Laboratory:  Most Recent Data:  CBC:   Lab Results   Component Value Date    WBC 8.4 2022    HGB 10.8 (L) 2022    HCT 35.3 (L) 2022     2022    MCV 82.7 2022    RDW 15.7 2022     WBC Differential:   Recent Labs   Lab 22  1036 22  0406  09/28/22  0207   WBC 5.8 7.8 8.4   HGB 12.0 11.3* 10.8*   HCT 38.4 37.1 35.3*    224 228   MCV 80.8 83.0 82.7     BMP:   Lab Results   Component Value Date     09/28/2022    K 3.9 09/28/2022    CO2 24 09/28/2022    BUN 14.8 09/28/2022    CREATININE 0.93 09/28/2022    CALCIUM 9.5 09/28/2022    MG 1.80 09/28/2022    PHOS 3.0 09/28/2022     LFTs:   Lab Results   Component Value Date    ALBUMIN 3.5 09/28/2022    BILITOT 0.3 09/28/2022    AST 12 09/28/2022    ALKPHOS 122 09/28/2022    ALT 17 09/28/2022     Coags:   Lab Results   Component Value Date    INR 0.94 09/26/2022    PROTIME 12.4 09/26/2022    PTT 95.3 09/28/2022     FLP:   Lab Results   Component Value Date    CHOL 115 04/13/2022    HDL 45 04/13/2022    TRIG 72 04/13/2022     DM:   Lab Results   Component Value Date    HGBA1C 7.7 (H) 09/27/2022    HGBA1C 6.4 04/13/2022    HGBA1C 5.7 10/13/2021    CREATININE 0.93 09/28/2022     Thyroid:   Lab Results   Component Value Date    TSH 0.7531 04/13/2022        Cardiac:   Lab Results   Component Value Date    TROPONINI <0.010 09/26/2022    BNP 20.7 09/26/2022     Urinalysis:   Lab Results   Component Value Date    COLORU Yellow 04/13/2022    PHUA 6.0 11/20/2019    NITRITE Negative 04/13/2022    KETONESU Negative 04/13/2022    UROBILINOGEN Normal 04/13/2022    WBCUA 0-5 04/13/2022       Trended Lab Data:  Recent Labs   Lab 09/26/22  1036 09/27/22  0405 09/28/22  0207   WBC 5.8 7.8 8.4   HGB 12.0 11.3* 10.8*   HCT 38.4 37.1 35.3*    224 228   MCV 80.8 83.0 82.7   RDW 15.7 15.7 15.7    139 138   K 4.0 3.5 3.9   CO2 27 25 24   BUN 11.8 13.9 14.8   CREATININE 0.97 0.88 0.93   ALBUMIN 3.7 3.5 3.5   BILITOT 0.5 0.5 0.3   AST 11 11 12   ALKPHOS 121 114 122   ALT 19 18 17       Trended Cardiac Data:  Recent Labs   Lab 09/26/22  1036 09/26/22  1323 09/26/22  1745   TROPONINI <0.010 <0.010 <0.010   BNP 20.7  --   --        Radiology:  Imaging Results              X-Ray Chest AP Portable (Final result)   Result time 09/26/22 11:38:45      Final result by Tj Dean MD (09/26/22 11:38:45)                   Narrative:    EXAMINATION  XR CHEST AP PORTABLE    CLINICAL HISTORY  Chest Pain;    TECHNIQUE  A total of 1 frontal view(s) of the chest.    COMPARISON  11 December 2019    FINDINGS  Lines/tubes/devices: Right chest wall power-injectable subcutaneous port and associated catheter remain in similar position.    The cardiomediastinal silhouette and central pulmonary vasculature are unremarkable for utilized technique.  The trachea is midline. There is no lobar consolidation, pleural effusion, or pneumothorax.    There is no acute osseous or extrathoracic abnormality.    IMPRESSION  No convincing acute radiographic abnormality.      Electronically signed by: Tj Dean  Date:    09/26/2022  Time:    11:38                                         Assessment & Plan:     Unstable Angina  -has hx HTN, HLD, DM, tobacco use  -[facility] 9/2016 - mild CAD in mid LAD (30% diffuse and eccentric, arthersclerotic, mildly tortuous lesion)  -start heparin gtt, ASA, Plavix  -continue home Coreg  -Atorvastatin 40mg daily  -Got Lexiscan yesterday, 9/27/22, moderate intensity small reversible defect in distal LAD territory, EF 59% resting, EF50% post stress.  -TTE w/ concentric hypertrophy of LV but otherwise normal  -Cardiology consulted, pt evaluated yesterday, 9/27/22  -pursuing coronary angiogram w/ L heart cath, worked up today, NPO at midnight  -likely to d/c heparin and DAPT tomorrow for low risk of true plaque rupture   -consider MRA  -Well's score 0  -started on Losartan 25mg  -ordered SL Nitro 0.4mg PRN for chest pain       Hypertension  -continue home BP meds  -consider ARB per cardiology referral -started on Losartan 25mg    HLD  -atorvastatin 40mg daily  -LDL well-controlled for several years    DM 2  -low-dose ssi  -Blood glu 300 today   -Increasing detemir to 15U nightly and Novolog to 8U TIDWM  -A1C is >7 on 9/27/22,  above goal  -consider SGLT-2 outpatient per cardiology referral     HFpEF  -continue home PO furosemide 40mg daily        CODE STATUS: Full  Access: PIV  Antibiotics: None  Diet: NPO after midnight  DVT Prophylaxis: Heparin gtt  GI Prophylaxis: None  Fluids: None      Disposition: Inpatient workup/tx for unstable angina, pending L heart cath. NPO after midnight.       Dari Medina MD  [facility] HO1

## 2022-09-29 ENCOUNTER — DOCUMENTATION ONLY (OUTPATIENT)
Dept: PHARMACY | Facility: HOSPITAL | Age: 62
End: 2022-09-29
Payer: MEDICARE

## 2022-09-29 VITALS
HEART RATE: 64 BPM | HEIGHT: 68 IN | TEMPERATURE: 98 F | OXYGEN SATURATION: 98 % | WEIGHT: 290.81 LBS | SYSTOLIC BLOOD PRESSURE: 124 MMHG | DIASTOLIC BLOOD PRESSURE: 73 MMHG | RESPIRATION RATE: 20 BRPM | BODY MASS INDEX: 44.07 KG/M2

## 2022-09-29 DIAGNOSIS — I20.0 UNSTABLE ANGINA: Primary | ICD-10-CM

## 2022-09-29 LAB
ALBUMIN SERPL-MCNC: 3.3 GM/DL (ref 3.4–4.8)
ALBUMIN/GLOB SERPL: 1 RATIO (ref 1.1–2)
ALP SERPL-CCNC: 120 UNIT/L (ref 40–150)
ALT SERPL-CCNC: 23 UNIT/L (ref 0–55)
APTT PPP: 26.7 SECONDS
AST SERPL-CCNC: 17 UNIT/L (ref 5–34)
BASOPHILS # BLD AUTO: 0.02 X10(3)/MCL (ref 0–0.2)
BASOPHILS NFR BLD AUTO: 0.3 %
BILIRUBIN DIRECT+TOT PNL SERPL-MCNC: 0.3 MG/DL
BUN SERPL-MCNC: 18.2 MG/DL (ref 9.8–20.1)
CALCIUM SERPL-MCNC: 9.5 MG/DL (ref 8.4–10.2)
CHLORIDE SERPL-SCNC: 106 MMOL/L (ref 98–107)
CO2 SERPL-SCNC: 26 MMOL/L (ref 23–31)
CREAT SERPL-MCNC: 1.04 MG/DL (ref 0.55–1.02)
EOSINOPHIL # BLD AUTO: 0.18 X10(3)/MCL (ref 0–0.9)
EOSINOPHIL NFR BLD AUTO: 2.4 %
ERYTHROCYTE [DISTWIDTH] IN BLOOD BY AUTOMATED COUNT: 15.9 % (ref 11.5–17)
GFR SERPLBLD CREATININE-BSD FMLA CKD-EPI: >60 MLS/MIN/1.73/M2
GLOBULIN SER-MCNC: 3.3 GM/DL (ref 2.4–3.5)
GLUCOSE SERPL-MCNC: 174 MG/DL (ref 82–115)
HCT VFR BLD AUTO: 33.7 % (ref 37–47)
HGB BLD-MCNC: 10.3 GM/DL (ref 12–16)
IMM GRANULOCYTES # BLD AUTO: 0.03 X10(3)/MCL (ref 0–0.04)
IMM GRANULOCYTES NFR BLD AUTO: 0.4 %
LYMPHOCYTES # BLD AUTO: 2.83 X10(3)/MCL (ref 0.6–4.6)
LYMPHOCYTES NFR BLD AUTO: 38 %
MAGNESIUM SERPL-MCNC: 1.6 MG/DL (ref 1.6–2.6)
MCH RBC QN AUTO: 25.4 PG (ref 27–31)
MCHC RBC AUTO-ENTMCNC: 30.6 MG/DL (ref 33–36)
MCV RBC AUTO: 83 FL (ref 80–94)
MONOCYTES # BLD AUTO: 0.67 X10(3)/MCL (ref 0.1–1.3)
MONOCYTES NFR BLD AUTO: 9 %
NEUTROPHILS # BLD AUTO: 3.7 X10(3)/MCL (ref 2.1–9.2)
NEUTROPHILS NFR BLD AUTO: 49.9 %
NRBC BLD AUTO-RTO: 0 %
PHOSPHATE SERPL-MCNC: 4.7 MG/DL (ref 2.3–4.7)
PLATELET # BLD AUTO: 224 X10(3)/MCL (ref 130–400)
PMV BLD AUTO: 11.3 FL (ref 7.4–10.4)
POCT GLUCOSE: 176 MG/DL (ref 70–110)
POCT GLUCOSE: 276 MG/DL (ref 70–110)
POTASSIUM SERPL-SCNC: 3.7 MMOL/L (ref 3.5–5.1)
PROT SERPL-MCNC: 6.6 GM/DL (ref 5.8–7.6)
RBC # BLD AUTO: 4.06 X10(6)/MCL (ref 4.2–5.4)
SODIUM SERPL-SCNC: 141 MMOL/L (ref 136–145)
WBC # SPEC AUTO: 7.5 X10(3)/MCL (ref 4.5–11.5)

## 2022-09-29 PROCEDURE — 85025 COMPLETE CBC W/AUTO DIFF WBC: CPT | Performed by: STUDENT IN AN ORGANIZED HEALTH CARE EDUCATION/TRAINING PROGRAM

## 2022-09-29 PROCEDURE — 83735 ASSAY OF MAGNESIUM: CPT | Performed by: STUDENT IN AN ORGANIZED HEALTH CARE EDUCATION/TRAINING PROGRAM

## 2022-09-29 PROCEDURE — 80053 COMPREHEN METABOLIC PANEL: CPT | Performed by: STUDENT IN AN ORGANIZED HEALTH CARE EDUCATION/TRAINING PROGRAM

## 2022-09-29 PROCEDURE — 84100 ASSAY OF PHOSPHORUS: CPT | Performed by: STUDENT IN AN ORGANIZED HEALTH CARE EDUCATION/TRAINING PROGRAM

## 2022-09-29 PROCEDURE — 63600175 PHARM REV CODE 636 W HCPCS

## 2022-09-29 PROCEDURE — 36415 COLL VENOUS BLD VENIPUNCTURE: CPT | Performed by: STUDENT IN AN ORGANIZED HEALTH CARE EDUCATION/TRAINING PROGRAM

## 2022-09-29 PROCEDURE — 25000003 PHARM REV CODE 250: Performed by: STUDENT IN AN ORGANIZED HEALTH CARE EDUCATION/TRAINING PROGRAM

## 2022-09-29 PROCEDURE — 85730 THROMBOPLASTIN TIME PARTIAL: CPT | Performed by: STUDENT IN AN ORGANIZED HEALTH CARE EDUCATION/TRAINING PROGRAM

## 2022-09-29 PROCEDURE — 94761 N-INVAS EAR/PLS OXIMETRY MLT: CPT

## 2022-09-29 RX ORDER — METFORMIN HYDROCHLORIDE 1000 MG/1
1000 TABLET, FILM COATED, EXTENDED RELEASE ORAL
Qty: 30 TABLET | Refills: 2 | Status: SHIPPED | OUTPATIENT
Start: 2022-09-29 | End: 2022-09-29 | Stop reason: SDUPTHER

## 2022-09-29 RX ORDER — LOSARTAN POTASSIUM 25 MG/1
25 TABLET ORAL DAILY
Qty: 60 TABLET | Refills: 1 | Status: SHIPPED | OUTPATIENT
Start: 2022-09-29 | End: 2022-09-29 | Stop reason: SDUPTHER

## 2022-09-29 RX ORDER — METFORMIN HYDROCHLORIDE 1000 MG/1
1000 TABLET, FILM COATED, EXTENDED RELEASE ORAL
Qty: 60 TABLET | Refills: 1 | OUTPATIENT
Start: 2022-09-29 | End: 2023-09-29

## 2022-09-29 RX ORDER — BUPROPION HYDROCHLORIDE 150 MG/1
150 TABLET ORAL 2 TIMES DAILY
Status: ON HOLD | COMMUNITY
Start: 2022-08-15 | End: 2022-09-29 | Stop reason: SDUPTHER

## 2022-09-29 RX ORDER — BUPROPION HYDROCHLORIDE 150 MG/1
150 TABLET ORAL 2 TIMES DAILY
Qty: 60 TABLET | Refills: 1 | Status: SHIPPED | OUTPATIENT
Start: 2022-09-29 | End: 2023-04-26

## 2022-09-29 RX ORDER — METFORMIN HYDROCHLORIDE 1000 MG/1
1000 TABLET, FILM COATED, EXTENDED RELEASE ORAL
Qty: 60 TABLET | Refills: 1 | Status: SHIPPED | OUTPATIENT
Start: 2022-09-29 | End: 2022-09-29 | Stop reason: SDUPTHER

## 2022-09-29 RX ORDER — BUPROPION HYDROCHLORIDE 150 MG/1
150 TABLET ORAL 2 TIMES DAILY
Qty: 60 TABLET | Refills: 1 | Status: SHIPPED | OUTPATIENT
Start: 2022-09-29 | End: 2022-09-29 | Stop reason: SDUPTHER

## 2022-09-29 RX ORDER — LOSARTAN POTASSIUM 25 MG/1
25 TABLET ORAL DAILY
Qty: 30 TABLET | Refills: 2 | Status: SHIPPED | OUTPATIENT
Start: 2022-09-29 | End: 2022-09-29 | Stop reason: SDUPTHER

## 2022-09-29 RX ORDER — BUPROPION HYDROCHLORIDE 150 MG/1
150 TABLET ORAL 2 TIMES DAILY
Qty: 60 TABLET | Refills: 1 | OUTPATIENT
Start: 2022-09-29

## 2022-09-29 RX ORDER — METFORMIN HYDROCHLORIDE 1000 MG/1
1000 TABLET, FILM COATED, EXTENDED RELEASE ORAL
Qty: 30 TABLET | Refills: 2 | Status: SHIPPED | OUTPATIENT
Start: 2022-09-29 | End: 2022-10-10

## 2022-09-29 RX ORDER — LOSARTAN POTASSIUM 25 MG/1
25 TABLET ORAL DAILY
Qty: 60 TABLET | Refills: 1 | OUTPATIENT
Start: 2022-09-29 | End: 2023-09-29

## 2022-09-29 RX ORDER — LOSARTAN POTASSIUM 25 MG/1
25 TABLET ORAL DAILY
Qty: 30 TABLET | Refills: 2 | Status: SHIPPED | OUTPATIENT
Start: 2022-09-29 | End: 2023-01-03 | Stop reason: SDUPTHER

## 2022-09-29 RX ADMIN — GABAPENTIN 600 MG: 300 CAPSULE ORAL at 09:09

## 2022-09-29 RX ADMIN — HYDRALAZINE HYDROCHLORIDE 50 MG: 50 TABLET ORAL at 09:09

## 2022-09-29 RX ADMIN — ASPIRIN 81 MG: 81 TABLET, COATED ORAL at 09:09

## 2022-09-29 RX ADMIN — INSULIN ASPART 8 UNITS: 100 INJECTION, SOLUTION INTRAVENOUS; SUBCUTANEOUS at 01:09

## 2022-09-29 RX ADMIN — CARVEDILOL 25 MG: 12.5 TABLET, FILM COATED ORAL at 09:09

## 2022-09-29 RX ADMIN — AMLODIPINE BESYLATE 10 MG: 10 TABLET ORAL at 09:09

## 2022-09-29 RX ADMIN — FUROSEMIDE 40 MG: 20 TABLET ORAL at 09:09

## 2022-09-29 RX ADMIN — ATORVASTATIN CALCIUM 40 MG: 40 TABLET, FILM COATED ORAL at 09:09

## 2022-09-29 NOTE — PROGRESS NOTES
[facility] received e-scripts for glumetza, wellbutrin and losartan but upon speaking with patient, she wished to get them at her local pharmacy. Nurse informed.

## 2022-09-29 NOTE — PLAN OF CARE
09/29/22 1145   Medicare Message   Important Message from Medicare regarding Discharge Appeal Rights Given to patient/caregiver;Explained to patient/caregiver;Signed/date by patient/caregiver   Date IMM was signed 09/29/22   Time IMM was signed 1141

## 2022-10-01 NOTE — DISCHARGE SUMMARY
[facility] Discharge Summary    Admitting Physician: Corky Mayer MD  Attending Physician: No att. providers found  Date of Admit: 9/26/2022  Date of Discharge: 10/1/2022    Condition: Good  Outcome: Condition has improved and patient is now ready for discharge.  DISPOSITION: Home or Self Care      Discharge Diagnoses     Patient Active Problem List   Diagnosis    Anxiety    Dysplasia of cervix    Carcinosarcoma of endometrium    CHF (congestive heart failure)    Degenerative disc disease, lumbar    Endometrial cancer    Gastroesophageal reflux disease    Primary hypertension    Morbid obesity    Peripheral neuropathy due to and not concurrent with chemotherapy    Diabetes mellitus without complication    Tobacco user    Multinodular thyroid    Uterine fibroid    Malignant neoplasm of endometrium    Tobacco use    Wellness examination    Palpitations    Hyperlipidemia LDL goal <70    Mild CAD    Unstable angina       Principal Problem:  Unstable angina    Consultants and Procedures     Consultants:  IP CONSULT TO CARDIOLOGY    Procedures:   Procedure(s) (LRB):  Angiogram, Coronary, with Left Heart Cath (N/A)     Brief Admission History and Hospital Course      Ms. Singh is a 62 F with PMH of HTN, HLD, DM, tobacco abuse, HFpEFwas admitted to [facility] for a 3-day history of intermittent chest pain that was concerning for unstable angina. She was started on ACS treatment and had a positive Lexiscan. She underwent LHC which showed no coronary artery disease. She was discharge with plans to follow up with [facility] IM Resident post wards clinic. Her home meds were resumed. Metformin ER 1000mg daily and losartan 25mg daily were added to her home regimen for renal protection and since her A1C was >7. She did not have any more chest pain during her hospital admission. Can f/u outpatient for further workup of her chest pain for GI or MSK causes.    Discharge physical exam:  Vitals  BP: 124/73  Temp: 98.4 °F  "(36.9 °C)  Temp src: Oral  Pulse: 64  Resp: 20  SpO2: 98 %  Height: 5' 8" (172.7 cm)  Weight: 131.9 kg (290 lb 12.6 oz)    General - Appears comfortable, appropriatley conversive   Mental Status - alert and oriented x 3, speaking in logical, relevant sentences   HEENT - no rhinorrhea   Cardiac - RRR, no murmurs, rubs, or gallops; no edema in LE   Respiratory - breathing comfortably; clear to ascultation bilaterally   Abdominal - nondistended, soft, nontender to palpation   Extremities - LE, UE, and joints are nonerythematous and nonswollen   Skin - no rashes or bruises seen on skin      TIME SPENT ON DISCHARGE: 60 minutes    Discharge Medications        Medication List        CONTINUE taking these medications      amLODIPine 10 MG tablet  Commonly known as: NORVASC  Take 1 tablet (10 mg total) by mouth once daily.     aspirin 81 MG EC tablet  Commonly known as: ECOTRIN  Take 1 tablet (81 mg total) by mouth once daily.     baclofen 10 MG tablet  Commonly known as: LIORESAL  Take 1 tablet (10 mg total) by mouth 3 (three) times daily as needed (muscle spasm).     carvediloL 25 MG tablet  Commonly known as: COREG  Take 1 tablet (25 mg total) by mouth 2 (two) times a day.     furosemide 40 MG tablet  Commonly known as: LASIX  Take 1 tablet (40 mg total) by mouth once daily.     gabapentin 600 MG tablet  Commonly known as: NEURONTIN  Take 1 tablet (600 mg total) by mouth 3 (three) times daily.     glipiZIDE 10 MG tablet  Commonly known as: GLUCOTROL  Take 1 tablet (10 mg total) by mouth daily with breakfast.     hydrALAZINE 50 MG tablet  Commonly known as: APRESOLINE  Take 1 tablet (50 mg total) by mouth 2 (two) times daily.     rosuvastatin 10 MG tablet  Commonly known as: CRESTOR  Take 1 tablet (10 mg total) by mouth every evening.            STOP taking these medications      buPROPion 150 MG TB24 tablet  Commonly known as: WELLBUTRIN XL            Start Taking:  Metformin 1000mg ER daily  Losartan 25mg " daily    Ciro Judge PGY 3

## 2022-10-10 ENCOUNTER — TELEPHONE (OUTPATIENT)
Dept: INTERNAL MEDICINE | Facility: CLINIC | Age: 62
End: 2022-10-10

## 2022-10-10 ENCOUNTER — OFFICE VISIT (OUTPATIENT)
Dept: INTERNAL MEDICINE | Facility: CLINIC | Age: 62
End: 2022-10-10
Payer: MEDICARE

## 2022-10-10 VITALS
DIASTOLIC BLOOD PRESSURE: 81 MMHG | RESPIRATION RATE: 18 BRPM | SYSTOLIC BLOOD PRESSURE: 137 MMHG | WEIGHT: 290.38 LBS | TEMPERATURE: 98 F | HEART RATE: 66 BPM | OXYGEN SATURATION: 98 % | HEIGHT: 68 IN | BODY MASS INDEX: 44.01 KG/M2

## 2022-10-10 DIAGNOSIS — I20.0 UNSTABLE ANGINA: ICD-10-CM

## 2022-10-10 DIAGNOSIS — R42 DIZZINESS: ICD-10-CM

## 2022-10-10 DIAGNOSIS — E11.9 TYPE 2 DIABETES MELLITUS WITHOUT COMPLICATION, WITHOUT LONG-TERM CURRENT USE OF INSULIN: Primary | ICD-10-CM

## 2022-10-10 PROCEDURE — 99215 OFFICE O/P EST HI 40 MIN: CPT | Mod: PBBFAC

## 2022-10-10 RX ORDER — METFORMIN HYDROCHLORIDE 500 MG/1
500 TABLET ORAL 2 TIMES DAILY WITH MEALS
Qty: 120 TABLET | Refills: 1 | Status: SHIPPED | OUTPATIENT
Start: 2022-10-10 | End: 2022-12-07 | Stop reason: SDUPTHER

## 2022-10-10 NOTE — TELEPHONE ENCOUNTER
Hi,    I saw your primary care patient, Ms. Singh, in post-wards clinic today. She's been feeling a bit lightheaded when she stands up, and her glucose has been running high, 250-350. I suspect her symptoms are due to osmotic diuresis from her hyperglycemia. I'm starting her on metformin 500mg BID with instruction to submit a glucose log to you in 2 weeks. I just want to keep you posted.    -Ciro uJdge

## 2022-10-10 NOTE — PROGRESS NOTES
INTERNAL MEDICINE RESIDENT CLINIC  CLINIC NOTE    Patient Name: Fernanda Singh  YOB: 1960    PRESENTING HISTORY       Discharge Summary  Ms. Singh is a 62 F with PMH of HTN, HLD, DM, tobacco abuse, HFpEF was admitted to [facility] on 9/26 for a 3-day history of intermittent chest pain that was concerning for unstable angina. She was started on ACS treatment and had a positive Lexiscan. She underwent LHC which showed no coronary artery disease. She was discharge with plans to follow up with [facility] IM Resident post wards clinic. Her home meds were resumed. Metformin ER 1000mg daily and losartan 25mg daily were added to her home regimen for renal protection and since her A1C was >7. She did not have any more chest pain during her hospital admission. Can f/u outpatient for further workup of her chest pain for GI or MSK causes.      Today:  Ms. Singh says that since her hospital stay she has been feeling well overall. However, her glucose has been 250-350 while her glucose were never that high prior to her hospital stay. She has been eating the same diet. Her insurance did not cover her metformin. Also, she has felt light-headed when she stands up. She checked her BP once and it was 150/108 at home. She is taking her losartan. No CP, Sob, Abdomianl pain, dysuria    ROS    As above    PAST HISTORY:     Past Medical History:   Diagnosis Date    Cancer 2018    CHF (congestive heart failure) 2012    Diabetes mellitus 2019    Disorder of kidney and ureter 2013    GERD (gastroesophageal reflux disease) 2013    Hypertension 2011    Sleep apnea 2013       Past Surgical History:   Procedure Laterality Date    ANGIOGRAM, CORONARY, WITH LEFT HEART CATHETERIZATION N/A 9/28/2022    Procedure: Angiogram, Coronary, with Left Heart Cath;  Surgeon: Milton Connolly MD;  Location: [facility] CATH LAB;  Service: Cardiology;  Laterality: N/A;    BILATERAL TUBAL LIGATION      Biopsy Gastrointestinal  11/13/2019    BIOPSY OF THYROID   07/2019    Catheter [facility]      [id number]    CERVICAL CONIZATION   W/ LASER  05/01/2018    COLONOSCOPY  05/10/2019    COLONOSCOPY  09/24/2014    DILATION AND CURETTAGE OF UTERUS  05/01/2018    ESOPHAGOGASTRODUODENOSCOPY  09/24/2014    ESOPHAGOGASTRODUODENOSCOPY  11/13/2019    Left breast small mass removal      Myomectomy Hysteroscopic  05/01/2018    POLYPECTOMY  05/10/2019    POLYPECTOMY  09/24/2014    TUBAL LIGATION         Family History   Problem Relation Age of Onset    Hypertension Mother     Heart failure Mother     Kidney failure Mother     Heart attack Father     Hypertension Father     Diabetes Father     Leukemia Brother     Cirrhosis Brother     Hypertension Brother     Esophageal cancer Brother        Social History     Socioeconomic History    Marital status:      Spouse name: Jude    Number of children: 3    Highest education level: Associate degree: occupational, technical, or vocational program   Occupational History    Occupation: unemployed   Tobacco Use    Smoking status: Every Day     Packs/day: 0.25     Years: 15.00     Pack years: 3.75     Types: Cigarettes    Smokeless tobacco: Never   Substance and Sexual Activity    Alcohol use: Not Currently     Alcohol/week: 1.0 standard drink     Types: 1 Glasses of wine per week     Comment: monthly    Drug use: Not Currently    Sexual activity: Yes     Partners: Male     Social Determinants of Health     Financial Resource Strain: Medium Risk    Difficulty of Paying Living Expenses: Somewhat hard   Food Insecurity: No Food Insecurity    Worried About Running Out of Food in the Last Year: Never true    Ran Out of Food in the Last Year: Never true   Transportation Needs: No Transportation Needs    Lack of Transportation (Medical): No    Lack of Transportation (Non-Medical): No   Physical Activity: Inactive    Days of Exercise per Week: 0 days    Minutes of Exercise per Session: 0 min   Stress: No Stress Concern Present    Feeling of  Stress : Not at all   Social Connections: Moderately Integrated    Frequency of Communication with Friends and Family: More than three times a week    Frequency of Social Gatherings with Friends and Family: More than three times a week    Attends Christianity Services: Never    Active Member of Clubs or Organizations: Yes    Attends Club or Organization Meetings: Never    Marital Status:    Housing Stability: Low Risk     Unable to Pay for Housing in the Last Year: No    Number of Places Lived in the Last Year: 1    Unstable Housing in the Last Year: No       MEDICATIONS & ALLERGIES:     Current Outpatient Medications on File Prior to Visit   Medication Sig    amLODIPine (NORVASC) 10 MG tablet Take 1 tablet (10 mg total) by mouth once daily.    aspirin (ECOTRIN) 81 MG EC tablet Take 1 tablet (81 mg total) by mouth once daily.    carvediloL (COREG) 25 MG tablet Take 1 tablet (25 mg total) by mouth 2 (two) times a day.    furosemide (LASIX) 40 MG tablet Take 1 tablet (40 mg total) by mouth once daily.    hydrALAZINE (APRESOLINE) 50 MG tablet Take 1 tablet (50 mg total) by mouth 2 (two) times daily.    losartan (COZAAR) 25 MG tablet Take 1 tablet (25 mg total) by mouth once daily.    rosuvastatin (CRESTOR) 10 MG tablet Take 1 tablet (10 mg total) by mouth every evening.    baclofen (LIORESAL) 10 MG tablet Take 1 tablet (10 mg total) by mouth 3 (three) times daily as needed (muscle spasm).    buPROPion (WELLBUTRIN XL) 150 MG TB24 tablet Take 1 tablet (150 mg total) by mouth 2 (two) times a day. (Patient not taking: Reported on 10/10/2022)    gabapentin (NEURONTIN) 600 MG tablet Take 1 tablet (600 mg total) by mouth 3 (three) times daily.    glipiZIDE (GLUCOTROL) 10 MG tablet Take 1 tablet (10 mg total) by mouth daily with breakfast.    metFORMIN (GLUMETZA) 1000 MG (MOD) 24hr tablet Take 1 tablet (1,000 mg total) by mouth daily with breakfast. (Patient not taking: Reported on 10/10/2022)     No current  "facility-administered medications on file prior to visit.       Review of patient's allergies indicates:   Allergen Reactions    Lisinopril Swelling     Other reaction(s): Angioedema of lips       OBJECTIVE:   Vital Signs:  Vitals:    10/10/22 1242   BP: 138/76   Pulse: 63   Resp: 18   Temp: 97.8 °F (36.6 °C)   SpO2: 98%   Weight: 131.7 kg (290 lb 6.4 oz)   Height: 5' 8" (1.727 m)       No results found for this or any previous visit (from the past 24 hour(s)).      Physical Exam    General - Appears comfortable, appropriatley conversive   Mental Status - alert and oriented x 3, speaking in logical, relevant sentences   HEENT - no rhinorrhea   Cardiac - RRR, no murmurs, rubs, or gallops; no edema in LE   Respiratory - breathing comfortably; clear to ascultation bilaterally   Abdominal - nondistended, soft, nontender to palpation   Extremities - LE, UE, and joints are nonerythematous and nonswollen   Skin - no rashes or bruises seen on skin      Laboratory  Lab Results   Component Value Date    WBC 7.5 09/29/2022    HGB 10.3 (L) 09/29/2022    HCT 33.7 (L) 09/29/2022    MCV 83.0 09/29/2022     09/29/2022       Lab Results   Component Value Date    INR 0.94 09/26/2022    PROTIME 12.4 09/26/2022     Lab Results   Component Value Date    HGBA1C 7.7 (H) 09/27/2022     No results for input(s): POCTGLUCOSE in the last 72 hours.        ASSESSMENT & PLAN:       Non-Cardiac chest pain  -LHC negative in the hospital  -has not had anymore chest pain since hospital stay    DM II  -continue glipizide 10mg daily  -will prescribe immediate release metformin 500mg BID  -will have her submit glucose log to her PCP, Oumar Bundy in a few weeks    HTN  -continue Coreg, losartan, Norvasc  -BP is normal today in clinic    Dizziness when standing  -likely osmotic diuresis 2/2 hyperglycemia  -starting metformin. She will submit glucose log in a couple weeks to her PCP to see if dose needs to be increased  -Orthostatic vitals normal " in clinic. SBP dropped from 156 to 137 but HR did not increase    Follow up with PCP    Discussed with Dr. Negro  - staff attestation to follow    Ciro Judge MD  Internal Medicine PGY-3

## 2022-10-10 NOTE — PROGRESS NOTES
I have reviewed and concur with the resident's history, physical, assessment, and plan.  I have discussed with him all issues related to the diagnosis, workup and treatment plan.Care provided as reasonable and necessary.    Andrae Negro MD  [facility]

## 2022-10-13 ENCOUNTER — HOSPITAL ENCOUNTER (EMERGENCY)
Facility: HOSPITAL | Age: 62
Discharge: HOME | End: 2022-10-13
Attending: EMERGENCY MEDICINE
Payer: MEDICARE

## 2022-10-13 VITALS
DIASTOLIC BLOOD PRESSURE: 77 MMHG | TEMPERATURE: 98 F | SYSTOLIC BLOOD PRESSURE: 126 MMHG | HEART RATE: 69 BPM | OXYGEN SATURATION: 100 % | RESPIRATION RATE: 15 BRPM

## 2022-10-13 DIAGNOSIS — R73.9 HYPERGLYCEMIA: ICD-10-CM

## 2022-10-13 DIAGNOSIS — R42 DIZZINESS: Primary | ICD-10-CM

## 2022-10-13 DIAGNOSIS — R06.02 SOB (SHORTNESS OF BREATH): ICD-10-CM

## 2022-10-13 LAB
ALBUMIN SERPL-MCNC: 4 GM/DL (ref 3.4–4.8)
ALBUMIN/GLOB SERPL: 0.9 RATIO (ref 1.1–2)
ALP SERPL-CCNC: 127 UNIT/L (ref 40–150)
ALT SERPL-CCNC: 17 UNIT/L (ref 0–55)
APPEARANCE UR: CLEAR
AST SERPL-CCNC: 10 UNIT/L (ref 5–34)
BACTERIA #/AREA URNS AUTO: NORMAL /HPF
BASOPHILS # BLD AUTO: 0.05 X10(3)/MCL (ref 0–0.2)
BASOPHILS NFR BLD AUTO: 0.6 %
BILIRUB UR QL STRIP.AUTO: NEGATIVE MG/DL
BILIRUBIN DIRECT+TOT PNL SERPL-MCNC: 0.5 MG/DL
BNP BLD-MCNC: <10 PG/ML
BUN SERPL-MCNC: 15.8 MG/DL (ref 9.8–20.1)
CALCIUM SERPL-MCNC: 10.1 MG/DL (ref 8.4–10.2)
CHLORIDE SERPL-SCNC: 103 MMOL/L (ref 98–107)
CO2 SERPL-SCNC: 22 MMOL/L (ref 23–31)
COLOR UR AUTO: YELLOW
CREAT SERPL-MCNC: 0.98 MG/DL (ref 0.55–1.02)
EOSINOPHIL # BLD AUTO: 0.18 X10(3)/MCL (ref 0–0.9)
EOSINOPHIL NFR BLD AUTO: 2.2 %
ERYTHROCYTE [DISTWIDTH] IN BLOOD BY AUTOMATED COUNT: 15.7 % (ref 11.5–17)
GFR SERPLBLD CREATININE-BSD FMLA CKD-EPI: >60 MLS/MIN/1.73/M2
GLOBULIN SER-MCNC: 4.3 GM/DL (ref 2.4–3.5)
GLUCOSE SERPL-MCNC: 220 MG/DL (ref 82–115)
GLUCOSE UR QL STRIP.AUTO: NEGATIVE MG/DL
HCT VFR BLD AUTO: 39.7 % (ref 37–47)
HGB BLD-MCNC: 12.8 GM/DL (ref 12–16)
IMM GRANULOCYTES # BLD AUTO: 0.03 X10(3)/MCL (ref 0–0.04)
IMM GRANULOCYTES NFR BLD AUTO: 0.4 %
KETONES UR QL STRIP.AUTO: NEGATIVE MG/DL
LEUKOCYTE ESTERASE UR QL STRIP.AUTO: NEGATIVE UNIT/L
LYMPHOCYTES # BLD AUTO: 2.91 X10(3)/MCL (ref 0.6–4.6)
LYMPHOCYTES NFR BLD AUTO: 34.9 %
MCH RBC QN AUTO: 25.5 PG (ref 27–31)
MCHC RBC AUTO-ENTMCNC: 32.2 MG/DL (ref 33–36)
MCV RBC AUTO: 79.1 FL (ref 80–94)
MONOCYTES # BLD AUTO: 0.63 X10(3)/MCL (ref 0.1–1.3)
MONOCYTES NFR BLD AUTO: 7.6 %
NEUTROPHILS # BLD AUTO: 4.5 X10(3)/MCL (ref 2.1–9.2)
NEUTROPHILS NFR BLD AUTO: 54.3 %
NITRITE UR QL STRIP.AUTO: NEGATIVE
NRBC BLD AUTO-RTO: 0 %
PH UR STRIP.AUTO: 5.5 [PH]
PLATELET # BLD AUTO: 289 X10(3)/MCL (ref 130–400)
PMV BLD AUTO: 10.7 FL (ref 7.4–10.4)
POTASSIUM SERPL-SCNC: 3.5 MMOL/L (ref 3.5–5.1)
PROT SERPL-MCNC: 8.3 GM/DL (ref 5.8–7.6)
PROT UR QL STRIP.AUTO: NEGATIVE MG/DL
RBC # BLD AUTO: 5.02 X10(6)/MCL (ref 4.2–5.4)
RBC #/AREA URNS AUTO: <5 /HPF
RBC UR QL AUTO: NEGATIVE UNIT/L
SODIUM SERPL-SCNC: 138 MMOL/L (ref 136–145)
SP GR UR STRIP.AUTO: 1.01 (ref 1–1.03)
SQUAMOUS #/AREA URNS AUTO: <5 /HPF
TROPONIN I SERPL-MCNC: <0.01 NG/ML (ref 0–0.04)
UROBILINOGEN UR STRIP-ACNC: 1 MG/DL
WBC # SPEC AUTO: 8.3 X10(3)/MCL (ref 4.5–11.5)
WBC #/AREA URNS AUTO: <5 /HPF

## 2022-10-13 PROCEDURE — 36415 COLL VENOUS BLD VENIPUNCTURE: CPT | Performed by: PHYSICIAN ASSISTANT

## 2022-10-13 PROCEDURE — 83880 ASSAY OF NATRIURETIC PEPTIDE: CPT | Performed by: PHYSICIAN ASSISTANT

## 2022-10-13 PROCEDURE — 99285 EMERGENCY DEPT VISIT HI MDM: CPT | Mod: 25

## 2022-10-13 PROCEDURE — 84484 ASSAY OF TROPONIN QUANT: CPT | Performed by: PHYSICIAN ASSISTANT

## 2022-10-13 PROCEDURE — 81001 URINALYSIS AUTO W/SCOPE: CPT | Performed by: PHYSICIAN ASSISTANT

## 2022-10-13 PROCEDURE — 80053 COMPREHEN METABOLIC PANEL: CPT | Performed by: PHYSICIAN ASSISTANT

## 2022-10-13 PROCEDURE — 85025 COMPLETE CBC W/AUTO DIFF WBC: CPT | Performed by: PHYSICIAN ASSISTANT

## 2022-10-13 NOTE — DISCHARGE INSTRUCTIONS
Please monitor your blood sugar 3 times a day over the next few days.  Please monitor fluid status over the next few days.  Please call and follow-up with your PCP in the next several days.  If your symptoms change or worsen please return to the ER.

## 2022-10-13 NOTE — ED PROVIDER NOTES
"Encounter Date: 10/13/2022       History     Chief Complaint   Patient presents with    Dizziness     Pt c/o dizziness at this time. Also reports chest pain and SOB prior to arrival that has resolved.      62-year-old black female presents to the ED with complaints of dizziness this morning.  She complains of some associated chest pain and shortness of breath that has since eased up.  She describes it as a "echo" in her chest.  She had a heart catheterization performed at this facility several weeks ago that was benign.  She was not started on any antianginal medicines.  She does have a past medical history of diabetes mellitus, colon cancer and CHF.  Her PCP is awake and alert.  She does see david Coaets at CHF clinic.  She denies any increasing fluid buildup.  She is recently started on metformin for her diabetes while hospitalized.  She assesses her blood sugar once daily.  She notes that it was 220 this morning.    The history is provided by the patient. No  was used.   Shortness of Breath  This is a recurrent problem. The average episode lasts 30 minutes. The problem occurs intermittently.The current episode started 12 to 24 hours ago. The problem has been resolved. Pertinent negatives include no abdominal pain. Associated medical issues include heart failure. Associated medical issues do not include COPD, PE or CAD.   Review of patient's allergies indicates:   Allergen Reactions    Lisinopril Swelling     Other reaction(s): Angioedema of lips     Past Medical History:   Diagnosis Date    Cancer 2018    CHF (congestive heart failure) 2012    Diabetes mellitus 2019    Disorder of kidney and ureter 2013    GERD (gastroesophageal reflux disease) 2013    Hypertension 2011    Sleep apnea 2013     Past Surgical History:   Procedure Laterality Date    ANGIOGRAM, CORONARY, WITH LEFT HEART CATHETERIZATION N/A 9/28/2022    Procedure: Angiogram, Coronary, with Left Heart Cath;  Surgeon: Milton LAMAR" MD Mohsen;  Location: [facility] CATH LAB;  Service: Cardiology;  Laterality: N/A;    BILATERAL TUBAL LIGATION      Biopsy Gastrointestinal  11/13/2019    BIOPSY OF THYROID  07/2019    [facility]      [id number]    CERVICAL CONIZATION   W/ LASER  05/01/2018    COLONOSCOPY  05/10/2019    COLONOSCOPY  09/24/2014    DILATION AND CURETTAGE OF UTERUS  05/01/2018    ESOPHAGOGASTRODUODENOSCOPY  09/24/2014    ESOPHAGOGASTRODUODENOSCOPY  11/13/2019    Left breast small mass removal      Myomectomy Hysteroscopic  05/01/2018    POLYPECTOMY  05/10/2019    POLYPECTOMY  09/24/2014    TUBAL LIGATION       Family History   Problem Relation Age of Onset    Hypertension Mother     Heart failure Mother     Kidney failure Mother     Heart attack Father     Hypertension Father     Diabetes Father     Leukemia Brother     Cirrhosis Brother     Hypertension Brother     Esophageal cancer Brother      Social History     Tobacco Use    Smoking status: Every Day     Packs/day: 0.25     Years: 15.00     Pack years: 3.75     Types: Cigarettes    Smokeless tobacco: Never   Substance Use Topics    Alcohol use: Not Currently     Alcohol/week: 1.0 standard drink     Types: 1 Glasses of wine per week     Comment: monthly    Drug use: Not Currently     Review of Systems   Constitutional:  Negative for activity change, appetite change and diaphoresis.   HENT:  Negative for congestion and sinus pressure.    Respiratory:  Positive for chest tightness and shortness of breath.    Gastrointestinal:  Negative for abdominal pain.   Neurological:  Positive for dizziness. Negative for weakness and numbness.   All other systems reviewed and are negative.    Physical Exam     Initial Vitals [10/13/22 1435]   BP Pulse Resp Temp SpO2   137/80 74 16 98.2 °F (36.8 °C) 100 %      MAP       --         Physical Exam    Nursing note and vitals reviewed.  Constitutional: She appears well-developed and well-nourished.   HENT:   Head: Normocephalic and  atraumatic.   Cardiovascular:  Normal rate, regular rhythm and normal heart sounds.           Pulmonary/Chest: Breath sounds normal.   Abdominal: Abdomen is soft. Bowel sounds are normal.   Musculoskeletal:         General: Normal range of motion.     Neurological: She is alert and oriented to person, place, and time.   Skin: Skin is warm.       ED Course   Procedures  Labs Reviewed   COMPREHENSIVE METABOLIC PANEL - Abnormal; Notable for the following components:       Result Value    Carbon Dioxide 22 (*)     Glucose Level 220 (*)     Protein Total 8.3 (*)     Globulin 4.3 (*)     Albumin/Globulin Ratio 0.9 (*)     All other components within normal limits   CBC WITH DIFFERENTIAL - Abnormal; Notable for the following components:    MCV 79.1 (*)     MCH 25.5 (*)     MCHC 32.2 (*)     MPV 10.7 (*)     All other components within normal limits   B-TYPE NATRIURETIC PEPTIDE - Normal   TROPONIN I - Normal   URINALYSIS, REFLEX TO URINE CULTURE - Normal   CBC W/ AUTO DIFFERENTIAL    Narrative:     The following orders were created for panel order CBC auto differential.  Procedure                               Abnormality         Status                     ---------                               -----------         ------                     CBC with Differential[[id number]]        Abnormal            Final result                 Please view results for these tests on the individual orders.   URINALYSIS, MICROSCOPIC     EKG Readings: (Independently Interpreted)   Initial Reading: No STEMI. Rhythm: Normal Sinus Rhythm. Heart Rate: 65. Ectopy: No Ectopy. Conduction: Normal. Clinical Impression: Normal Sinus Rhythm     Imaging Results              CT Head Without Contrast (Final result)  Result time 10/13/22 16:00:59      Final result by Lea Hall MD (10/13/22 16:00:59)                   Impression:      No appreciable acute intracranial abnormality.      Electronically signed by: Lea  Rafael  Date:    10/13/2022  Time:    16:00               Narrative:    EXAMINATION:  CT HEAD WITHOUT CONTRAST    CLINICAL HISTORY:  Dizziness, persistent/recurrent, cardiac or vascular cause suspected;    TECHNIQUE:  Low dose axial CT images obtained throughout the head without intravenous contrast.  Axial, sagittal and coronal reconstructions were performed and interpreted.    DLP: 944 mGycm    All CT scans at this location are performed using dose optimization techniques as appropriate to a performed exam including the following automated exposure control, adjustment of the mA and/or kV according to patient size and/or use of iterative reconstruction technique    COMPARISON:  CT head 05/26/2015    FINDINGS:  BRAIN: Gray white differentiation is maintained. White matter is within normal limits for age.  No hemorrhage. No edema. No mass effect or midline shift.  The posterior fossa and midline structures are unremarkable.    VENTRICLES: Normal in size and configuration.    EXTRA-AXIAL: No abnormal extra-axial collections.    BONES: Calvarium is intact.    SINUSES AND MASTOIDS: Visualized paranasal sinuses and mastoid air cells are clear.                                       X-Ray Chest AP Portable (Final result)  Result time 10/13/22 15:24:03      Final result by Jonas Davila MD (10/13/22 15:24:03)                   Impression:      No acute chest disease is identified.      Electronically signed by: Jonas Davila  Date:    10/13/2022  Time:    15:24               Narrative:    EXAMINATION:  XR CHEST AP PORTABLE    CLINICAL HISTORY:  sob;, .    COMPARISON:  September 26, 2022    FINDINGS:  No alveolar consolidation, effusion, or pneumothorax is seen.   The thoracic aorta is normal  cardiac silhouette, central pulmonary vessels and mediastinum are normal in size and are grossly unremarkable.   visualized osseous structures are grossly unremarkable.                                    X-Rays:    Independently Interpreted Readings:   Chest X-Ray: Normal heart size.  No infiltrates.  No acute abnormalities.   Head CT: No hemorrhage.  No acute stroke.  No skull fracture.   Medications - No data to display     Additional MDM:   Differential Diagnosis:   Other: The following diagnoses were also considered and will be evaluated: Unstable angina, Hyperglycemia. Throughout the course of her emergency department stay patient is feeling much better.  Long discussion about hyperglycemia treatment and differential diagnosis.  Will have her follow up with cardiologist as well as PCP in the next several days.  Patient voices her understanding.  She agrees to smoking cessation.       Well's Criteria Score:  -Clinical symptoms of DVT (leg swelling, pain with palpation) = 0.0  -Other diagnosis less likely than pulmonary embolism =            0.0  -Heart Rate >100 =   0.0  -Immobilization (= or > than 3 days) or surgery in the previous 4 weeks = 0.0  -Previous DVT/PE = 0.0  -Hemoptysis =          0.0  -Malignancy =           1.0  Well's Probability Score =    1                         Clinical Impression:   Final diagnoses:  [R06.02] SOB (shortness of breath)  [R42] Dizziness (Primary)  [R73.9] Hyperglycemia        ED Disposition Condition    Discharge Stable          ED Prescriptions    None       Follow-up Information    None          Shweta Ku PA-C  10/13/22 0361       Shweta Ku PA-C  10/13/22 2571

## 2022-10-13 NOTE — FIRST PROVIDER EVALUATION
Medical screening examination initiated.  I have conducted a focused provider triage encounter, findings are as follows:    Chief Complaint   Patient presents with    Dizziness     Pt c/o dizziness at this time. Also reports chest pain and SOB prior to arrival that has resolved.      Brief history of present illness:  62 y.o. female presents to the ED with SOB, weakness, dizziness, chest sweetie onset this morning. Given nitro in the ambulance and notes some relief of pain.     Vitals:    10/13/22 1435   BP: 137/80   Pulse: 74   Resp: 16   Temp: 98.2 °F (36.8 °C)   TempSrc: Oral   SpO2: 100%       Pertinent physical exam:  Awake, alert, ambulatory, non-labored respirations    Brief workup plan:  labs, Ekg, CT CXR    Preliminary workup initiated; this workup will be continued and followed by the physician or advanced practice provider that is assigned to the patient when roomed.

## 2022-10-17 PROBLEM — Z00.00 WELLNESS EXAMINATION: Status: RESOLVED | Noted: 2022-07-13 | Resolved: 2022-10-17

## 2022-10-18 ENCOUNTER — OFFICE VISIT (OUTPATIENT)
Dept: CARDIOLOGY | Facility: CLINIC | Age: 62
End: 2022-10-18
Payer: MEDICARE

## 2022-10-18 VITALS
TEMPERATURE: 98 F | HEART RATE: 62 BPM | WEIGHT: 290.81 LBS | RESPIRATION RATE: 20 BRPM | SYSTOLIC BLOOD PRESSURE: 137 MMHG | DIASTOLIC BLOOD PRESSURE: 80 MMHG | BODY MASS INDEX: 44.07 KG/M2 | HEIGHT: 68 IN | OXYGEN SATURATION: 100 %

## 2022-10-18 DIAGNOSIS — I25.10 MILD CAD: ICD-10-CM

## 2022-10-18 DIAGNOSIS — Z72.0 TOBACCO USER: ICD-10-CM

## 2022-10-18 DIAGNOSIS — I50.32 CHRONIC HEART FAILURE WITH PRESERVED EJECTION FRACTION: ICD-10-CM

## 2022-10-18 DIAGNOSIS — E78.5 HYPERLIPIDEMIA LDL GOAL <70: Primary | ICD-10-CM

## 2022-10-18 DIAGNOSIS — E66.01 MORBID OBESITY: ICD-10-CM

## 2022-10-18 DIAGNOSIS — R07.89 OTHER CHEST PAIN: ICD-10-CM

## 2022-10-18 DIAGNOSIS — R00.2 PALPITATIONS: ICD-10-CM

## 2022-10-18 PROCEDURE — 3060F PR POS MICROALBUMINURIA RESULT DOCUMENTED/REVIEW: ICD-10-PCS | Mod: CPTII,,, | Performed by: NURSE PRACTITIONER

## 2022-10-18 PROCEDURE — 1111F DSCHRG MED/CURRENT MED MERGE: CPT | Mod: CPTII,,, | Performed by: NURSE PRACTITIONER

## 2022-10-18 PROCEDURE — 3075F PR MOST RECENT SYSTOLIC BLOOD PRESS GE 130-139MM HG: ICD-10-PCS | Mod: CPTII,,, | Performed by: NURSE PRACTITIONER

## 2022-10-18 PROCEDURE — 3079F PR MOST RECENT DIASTOLIC BLOOD PRESSURE 80-89 MM HG: ICD-10-PCS | Mod: CPTII,,, | Performed by: NURSE PRACTITIONER

## 2022-10-18 PROCEDURE — 1160F RVW MEDS BY RX/DR IN RCRD: CPT | Mod: CPTII,,, | Performed by: NURSE PRACTITIONER

## 2022-10-18 PROCEDURE — 3066F PR DOCUMENTATION OF TREATMENT FOR NEPHROPATHY: ICD-10-PCS | Mod: CPTII,,, | Performed by: NURSE PRACTITIONER

## 2022-10-18 PROCEDURE — 99215 OFFICE O/P EST HI 40 MIN: CPT | Mod: PBBFAC | Performed by: NURSE PRACTITIONER

## 2022-10-18 PROCEDURE — 3066F NEPHROPATHY DOC TX: CPT | Mod: CPTII,,, | Performed by: NURSE PRACTITIONER

## 2022-10-18 PROCEDURE — 1159F PR MEDICATION LIST DOCUMENTED IN MEDICAL RECORD: ICD-10-PCS | Mod: CPTII,,, | Performed by: NURSE PRACTITIONER

## 2022-10-18 PROCEDURE — 3075F SYST BP GE 130 - 139MM HG: CPT | Mod: CPTII,,, | Performed by: NURSE PRACTITIONER

## 2022-10-18 PROCEDURE — 3060F POS MICROALBUMINURIA REV: CPT | Mod: CPTII,,, | Performed by: NURSE PRACTITIONER

## 2022-10-18 PROCEDURE — 4010F ACE/ARB THERAPY RXD/TAKEN: CPT | Mod: CPTII,,, | Performed by: NURSE PRACTITIONER

## 2022-10-18 PROCEDURE — 99214 OFFICE O/P EST MOD 30 MIN: CPT | Mod: S$PBB,,, | Performed by: NURSE PRACTITIONER

## 2022-10-18 PROCEDURE — 3079F DIAST BP 80-89 MM HG: CPT | Mod: CPTII,,, | Performed by: NURSE PRACTITIONER

## 2022-10-18 PROCEDURE — 1159F MED LIST DOCD IN RCRD: CPT | Mod: CPTII,,, | Performed by: NURSE PRACTITIONER

## 2022-10-18 PROCEDURE — 4010F PR ACE/ARB THEARPY RXD/TAKEN: ICD-10-PCS | Mod: CPTII,,, | Performed by: NURSE PRACTITIONER

## 2022-10-18 PROCEDURE — 99214 PR OFFICE/OUTPT VISIT, EST, LEVL IV, 30-39 MIN: ICD-10-PCS | Mod: S$PBB,,, | Performed by: NURSE PRACTITIONER

## 2022-10-18 PROCEDURE — 1160F PR REVIEW ALL MEDS BY PRESCRIBER/CLIN PHARMACIST DOCUMENTED: ICD-10-PCS | Mod: CPTII,,, | Performed by: NURSE PRACTITIONER

## 2022-10-18 PROCEDURE — 1111F PR DISCHARGE MEDS RECONCILED W/ CURRENT OUTPATIENT MED LIST: ICD-10-PCS | Mod: CPTII,,, | Performed by: NURSE PRACTITIONER

## 2022-10-18 RX ORDER — NITROGLYCERIN 0.4 MG/1
0.4 TABLET SUBLINGUAL EVERY 5 MIN PRN
Qty: 25 TABLET | Refills: 3 | Status: SHIPPED | OUTPATIENT
Start: 2022-10-18 | End: 2023-08-23 | Stop reason: SDUPTHER

## 2022-10-18 NOTE — PROGRESS NOTES
CHIEF COMPLAINT:   Chief Complaint   Patient presents with    F/U POST HOSP. STAY, DENIES CHEST PAINS  AT PRESENT, C./O S                   Review of patient's allergies indicates:   Allergen Reactions    Lisinopril Swelling     Other reaction(s): Angioedema of lips                                          HPI:  Fernanda Singh 62 y.o. female with a past medical history of mild CAD, hypertension, GERD, CKD, chronic tobacco use, obesity, breast mass (2016), uterine CA (5/2017), and hysterectomy presents for follow after recent hospital admission.  She completed a 48-hour Holter monitor in February 2021 which revealed she was in sinus rhythm with an average heart rate of 77 bpm. See report below.  Patient had a recent hospital admission on September 26, 2022 for complaints of chest pain and shortness of breath.  Patient completed an inpatient Lexiscan stress test which was abnormal.  See report below.  She subsequently underwent coronary angiogram procedure on September 28, 2022 which revealed normal coronary arteries.  She also completed an echocardiogram while inpatient which revealed an ejection fraction of 60%.  See report below.  Patient also presented back to the emergency room on October 13, 2022 with complaints of dizziness and chest pain.  She states she was given nitroglycerin via EMS in route to the emergency room which relieved her chest pain.  She states the chest pain resolved by the time she arrived at the emergency room.  Troponin level drawn was negative.  Patient denies any further episodes of chest pain since that time.  She does endorse occasional shortness of breath with exertion as well as palpitations.  Patient does state she feels the palpitations have worsened since her previous appointment.  She is currently smoking approximately 5 cigarettes per day, but states she is trying to quit.  She reports compliance with her medications.        Coronary Angiogram 9.28.22:  The pre-procedure left  ventricular end diastolic pressure was 5.  The estimated blood loss was none.  The coronary arteries were normal..  FINDINGS  LVEDP:  5 mmHg  The patient has No CAD   Blood loss:  less than 15 cc.    Lexiscan Stress Test 9.27.22:    Abnormal myocardial perfusion scan.  There is a moderate intensity, small sized, reversible perfusion abnormality that is consistent with ischemia in the distal anteroseptal and apical wall(s) in the typical distribution of the LAD territory.  There are no other significant perfusion abnormalities.  The gated perfusion images showed an ejection fraction of 59% at rest. The gated perfusion images showed an ejection fraction of 50% post stress.  There is normal wall motion at rest and post stress.  LV cavity size is normal at rest and normal at stress.  The EKG portion of this study is positive for ischemia.  The patient reported no chest pain during the stress test.    Echocardiogram 9.26.222:  The left ventricle is normal in size with concentric hypertrophy and normal systolic function. The estimated ejection fraction is 60%.  Normal right ventricular size with normal right ventricular systolic function.    48-hour Holter monitor February 2021:  The patient was in sinus rhythm with an average heart rate of 77 bpm. Heart rates greater than 120 bpm were noted less than 1% of the time. No episodes of bradycardia was noted. Fine less than 1% of beats were due to PVCs. Short burst of V run lasting for 4 beats at 200 bpm.  Occasional PACs. Episode of SVT lasting for 8 beats at 148 bpm.  No diary was returned.    Echo 2/20  Mild concentric left ventricular hypertrophy.Left ventricular ejection fraction is measured at approximately 50 to 55 %.    Holter (48hr)-11/2016 : Patient was in normal sinus rhythm throughout. Supraventricular ectopic beats consisted of very rare premature beats. Ventricular ectopic beats consisted of very rare unifocal beats.    Left heart cath on September 19,  2016:  Mild CAD  Hypertensive disease  Normal LVEF 60%  Recommendations: Medical management and aggressive risk factor management    ECHO (2015)  EF >55%                                                                                                                                                                                                                                                              Patient Active Problem List   Diagnosis    Anxiety    Dysplasia of cervix    Carcinosarcoma of endometrium    CHF (congestive heart failure)    Degenerative disc disease, lumbar    Endometrial cancer    Gastroesophageal reflux disease    Primary hypertension    Morbid obesity    Peripheral neuropathy due to and not concurrent with chemotherapy    Diabetes    Tobacco user    Multinodular thyroid    Uterine fibroid    Malignant neoplasm of endometrium    Tobacco use    Palpitations    Hyperlipidemia LDL goal <70    Mild CAD    Unstable angina    Dizziness     Past Surgical History:   Procedure Laterality Date    ANGIOGRAM, CORONARY, WITH LEFT HEART CATHETERIZATION N/A 9/28/2022    Procedure: Angiogram, Coronary, with Left Heart Cath;  Surgeon: Milton Connolly MD;  Location: [facility] CATH LAB;  Service: Cardiology;  Laterality: N/A;    BILATERAL TUBAL LIGATION      Biopsy Gastrointestinal  11/13/2019    [facility]  [id number]    CERVICAL CONIZATION   W/ LASER  05/01/2018    COLONOSCOPY  05/10/2019    COLONOSCOPY  09/24/2014    DILATION AND CURETTAGE OF UTERUS  05/01/2018    ESOPHAGOGASTRODUODENOSCOPY  09/24/2014    ESOPHAGOGASTRODUODENOSCOPY  11/13/2019    Left breast small mass removal      Myomectomy Hysteroscopic  05/01/2018    POLYPECTOMY  05/10/2019    POLYPECTOMY  09/24/2014    TUBAL LIGATION       Social History     Socioeconomic History    Marital status:      Spouse name: Jude    Number of children: 3    Highest education level: Associate degree:  occupational, technical, or vocational program   Occupational History    Occupation: unemployed   Tobacco Use    Smoking status: Every Day     Packs/day: 0.50     Years: 15.00     Pack years: 7.50     Types: Cigarettes    Smokeless tobacco: Never   Substance and Sexual Activity    Alcohol use: Not Currently     Alcohol/week: 1.0 standard drink     Types: 1 Glasses of wine per week     Comment: monthly    Drug use: Not Currently    Sexual activity: Yes     Partners: Male     Social Determinants of Health     Financial Resource Strain: Medium Risk    Difficulty of Paying Living Expenses: Somewhat hard   Food Insecurity: No Food Insecurity    Worried About Running Out of Food in the Last Year: Never true    Ran Out of Food in the Last Year: Never true   Transportation Needs: No Transportation Needs    Lack of Transportation (Medical): No    Lack of Transportation (Non-Medical): No   Physical Activity: Inactive    Days of Exercise per Week: 0 days    Minutes of Exercise per Session: 0 min   Stress: No Stress Concern Present    Feeling of Stress : Not at all   Social Connections: Moderately Integrated    Frequency of Communication with Friends and Family: More than three times a week    Frequency of Social Gatherings with Friends and Family: More than three times a week    Attends Temple Services: Never    Active Member of Clubs or Organizations: Yes    Attends Club or Organization Meetings: Never    Marital Status:    Housing Stability: Low Risk     Unable to Pay for Housing in the Last Year: No    Number of Places Lived in the Last Year: 1    Unstable Housing in the Last Year: No        Family History   Problem Relation Age of Onset    Hypertension Mother     Heart failure Mother     Kidney failure Mother     Heart attack Father     Hypertension Father     Diabetes Father     Leukemia Brother     Cirrhosis Brother     Hypertension Brother     Esophageal cancer Brother          Current Outpatient Medications:      amLODIPine (NORVASC) 10 MG tablet, Take 1 tablet (10 mg total) by mouth once daily., Disp: 90 tablet, Rfl: 3    aspirin (ECOTRIN) 81 MG EC tablet, Take 1 tablet (81 mg total) by mouth once daily., Disp: 90 tablet, Rfl: 3    buPROPion (WELLBUTRIN XL) 150 MG TB24 tablet, Take 1 tablet (150 mg total) by mouth 2 (two) times a day., Disp: 60 tablet, Rfl: 1    carvediloL (COREG) 25 MG tablet, Take 1 tablet (25 mg total) by mouth 2 (two) times a day., Disp: 180 tablet, Rfl: 3    furosemide (LASIX) 40 MG tablet, Take 1 tablet (40 mg total) by mouth once daily., Disp: 90 tablet, Rfl: 3    hydrALAZINE (APRESOLINE) 50 MG tablet, Take 1 tablet (50 mg total) by mouth 2 (two) times daily., Disp: 180 tablet, Rfl: 3    losartan (COZAAR) 25 MG tablet, Take 1 tablet (25 mg total) by mouth once daily., Disp: 30 tablet, Rfl: 2    metFORMIN (GLUCOPHAGE) 500 MG tablet, Take 1 tablet (500 mg total) by mouth 2 (two) times daily with meals., Disp: 120 tablet, Rfl: 1    rosuvastatin (CRESTOR) 10 MG tablet, Take 1 tablet (10 mg total) by mouth every evening., Disp: 90 tablet, Rfl: 3    baclofen (LIORESAL) 10 MG tablet, Take 1 tablet (10 mg total) by mouth 3 (three) times daily as needed (muscle spasm)., Disp: 30 tablet, Rfl: 1    gabapentin (NEURONTIN) 600 MG tablet, Take 1 tablet (600 mg total) by mouth 3 (three) times daily., Disp: 270 tablet, Rfl: 1    glipiZIDE (GLUCOTROL) 10 MG tablet, Take 1 tablet (10 mg total) by mouth daily with breakfast., Disp: 90 tablet, Rfl: 1     ROS:                                                                                                                                                                             Review of Systems   Constitutional: Negative.    Respiratory:  Positive for shortness of breath.    Cardiovascular:  Positive for palpitations.        See HPI   Gastrointestinal: Negative.    Musculoskeletal: Negative.    Skin: Negative.    Neurological: Negative.    Psychiatric/Behavioral:  "Negative.        Blood pressure 137/80, pulse 62, temperature 98.1 °F (36.7 °C), temperature source Oral, resp. rate 20, height 5' 8" (1.727 m), weight 131.9 kg (290 lb 12.6 oz), SpO2 100 %.   PE:  Physical Exam  Constitutional:       Appearance: Normal appearance.   HENT:      Head: Normocephalic and atraumatic.   Eyes:      Extraocular Movements: Extraocular movements intact.      Pupils: Pupils are equal, round, and reactive to light.   Cardiovascular:      Rate and Rhythm: Normal rate and regular rhythm.   Pulmonary:      Effort: Pulmonary effort is normal.      Breath sounds: Normal breath sounds.   Abdominal:      Palpations: Abdomen is soft.   Musculoskeletal:         General: Normal range of motion.      Cervical back: Normal range of motion.   Skin:     General: Skin is warm and dry.   Neurological:      General: No focal deficit present.      Mental Status: She is alert and oriented to person, place, and time.   Psychiatric:         Mood and Affect: Mood normal.         Behavior: Behavior normal.      ASSESSMENT/PLAN:  Mild CAD per LHC in 2016  S/p LHC on 9.28.22 which revealed normal coronary arteries   Continue aspirin, coreg, and crestor  Counseled on heart healthy diet, exercise, and smoking cessation    HFpEF  EF 60% per Echo 9.26.22  Continue Lasix    HTN (hypertension)   BP at goal - 137/80  Continue Amlodipine, Coreg, hydralazine, Losartan, and Lasix  Counseled on low salt diet and exercise    Palpitations  48 hour Holter monitor February 2021 - revealed she was in sinus rhythm with an average heart rate of 77 bpm. See report under HPI.   Continues to endorse palpitations -worse since previous visit   Will repeat 48 hour Holter monitor     HLD  LDL at goal - 56  Continue Crestor 10mg daily   Counseled on low cholesterol diet and exercise    DM  A1C at goal - 7.7  Management per PCP    Tobacco use  Counseled on smoking cessation  She currently smokes about 5 cigarettes per day - states she is trying " to quit     48 hour Holter monitor  Follow-up in cardiology clinic in 3 months   Follow-up with PCP as directed

## 2022-10-24 ENCOUNTER — OFFICE VISIT (OUTPATIENT)
Dept: INTERNAL MEDICINE | Facility: CLINIC | Age: 62
End: 2022-10-24
Payer: MEDICARE

## 2022-10-24 ENCOUNTER — PATIENT MESSAGE (OUTPATIENT)
Dept: INTERNAL MEDICINE | Facility: CLINIC | Age: 62
End: 2022-10-24

## 2022-10-24 VITALS
WEIGHT: 291.19 LBS | SYSTOLIC BLOOD PRESSURE: 134 MMHG | HEART RATE: 65 BPM | DIASTOLIC BLOOD PRESSURE: 84 MMHG | HEIGHT: 68 IN | BODY MASS INDEX: 44.13 KG/M2 | TEMPERATURE: 98 F | RESPIRATION RATE: 20 BRPM

## 2022-10-24 DIAGNOSIS — E11.9 TYPE 2 DIABETES MELLITUS WITHOUT COMPLICATION, WITHOUT LONG-TERM CURRENT USE OF INSULIN: ICD-10-CM

## 2022-10-24 DIAGNOSIS — I25.10 MILD CAD: ICD-10-CM

## 2022-10-24 DIAGNOSIS — F17.200 NEEDS SMOKING CESSATION EDUCATION: ICD-10-CM

## 2022-10-24 DIAGNOSIS — Z23 NEED FOR VACCINATION: Primary | ICD-10-CM

## 2022-10-24 DIAGNOSIS — R07.89 OTHER CHEST PAIN: ICD-10-CM

## 2022-10-24 DIAGNOSIS — Z00.00 WELLNESS EXAMINATION: ICD-10-CM

## 2022-10-24 DIAGNOSIS — I10 PRIMARY HYPERTENSION: ICD-10-CM

## 2022-10-24 PROBLEM — Z72.0 TOBACCO USE: Status: RESOLVED | Noted: 2019-01-10 | Resolved: 2022-10-24

## 2022-10-24 PROBLEM — N87.9 DYSPLASIA OF CERVIX: Status: RESOLVED | Noted: 2022-05-11 | Resolved: 2022-10-24

## 2022-10-24 PROBLEM — R42 DIZZINESS: Status: RESOLVED | Noted: 2022-10-10 | Resolved: 2022-10-24

## 2022-10-24 PROCEDURE — 4010F PR ACE/ARB THEARPY RXD/TAKEN: ICD-10-PCS | Mod: CPTII,,, | Performed by: NURSE PRACTITIONER

## 2022-10-24 PROCEDURE — 99214 OFFICE O/P EST MOD 30 MIN: CPT | Mod: PBBFAC | Performed by: NURSE PRACTITIONER

## 2022-10-24 PROCEDURE — 3075F PR MOST RECENT SYSTOLIC BLOOD PRESS GE 130-139MM HG: ICD-10-PCS | Mod: CPTII,,, | Performed by: NURSE PRACTITIONER

## 2022-10-24 PROCEDURE — 3075F SYST BP GE 130 - 139MM HG: CPT | Mod: CPTII,,, | Performed by: NURSE PRACTITIONER

## 2022-10-24 PROCEDURE — 3060F POS MICROALBUMINURIA REV: CPT | Mod: CPTII,,, | Performed by: NURSE PRACTITIONER

## 2022-10-24 PROCEDURE — 99215 OFFICE O/P EST HI 40 MIN: CPT | Mod: S$PBB,,, | Performed by: NURSE PRACTITIONER

## 2022-10-24 PROCEDURE — 90677 PCV20 VACCINE IM: CPT | Mod: PBBFAC

## 2022-10-24 PROCEDURE — 1159F PR MEDICATION LIST DOCUMENTED IN MEDICAL RECORD: ICD-10-PCS | Mod: CPTII,,, | Performed by: NURSE PRACTITIONER

## 2022-10-24 PROCEDURE — 1160F PR REVIEW ALL MEDS BY PRESCRIBER/CLIN PHARMACIST DOCUMENTED: ICD-10-PCS | Mod: CPTII,,, | Performed by: NURSE PRACTITIONER

## 2022-10-24 PROCEDURE — G0008 ADMIN INFLUENZA VIRUS VAC: HCPCS | Mod: PBBFAC

## 2022-10-24 PROCEDURE — 1159F MED LIST DOCD IN RCRD: CPT | Mod: CPTII,,, | Performed by: NURSE PRACTITIONER

## 2022-10-24 PROCEDURE — 1160F RVW MEDS BY RX/DR IN RCRD: CPT | Mod: CPTII,,, | Performed by: NURSE PRACTITIONER

## 2022-10-24 PROCEDURE — 3060F PR POS MICROALBUMINURIA RESULT DOCUMENTED/REVIEW: ICD-10-PCS | Mod: CPTII,,, | Performed by: NURSE PRACTITIONER

## 2022-10-24 PROCEDURE — 99215 PR OFFICE/OUTPT VISIT, EST, LEVL V, 40-54 MIN: ICD-10-PCS | Mod: S$PBB,,, | Performed by: NURSE PRACTITIONER

## 2022-10-24 PROCEDURE — 1111F DSCHRG MED/CURRENT MED MERGE: CPT | Mod: CPTII,,, | Performed by: NURSE PRACTITIONER

## 2022-10-24 PROCEDURE — 3066F NEPHROPATHY DOC TX: CPT | Mod: CPTII,,, | Performed by: NURSE PRACTITIONER

## 2022-10-24 PROCEDURE — 3066F PR DOCUMENTATION OF TREATMENT FOR NEPHROPATHY: ICD-10-PCS | Mod: CPTII,,, | Performed by: NURSE PRACTITIONER

## 2022-10-24 PROCEDURE — 1111F PR DISCHARGE MEDS RECONCILED W/ CURRENT OUTPATIENT MED LIST: ICD-10-PCS | Mod: CPTII,,, | Performed by: NURSE PRACTITIONER

## 2022-10-24 PROCEDURE — 4010F ACE/ARB THERAPY RXD/TAKEN: CPT | Mod: CPTII,,, | Performed by: NURSE PRACTITIONER

## 2022-10-24 PROCEDURE — 3079F PR MOST RECENT DIASTOLIC BLOOD PRESSURE 80-89 MM HG: ICD-10-PCS | Mod: CPTII,,, | Performed by: NURSE PRACTITIONER

## 2022-10-24 PROCEDURE — 3079F DIAST BP 80-89 MM HG: CPT | Mod: CPTII,,, | Performed by: NURSE PRACTITIONER

## 2022-10-24 RX ORDER — DAPAGLIFLOZIN 5 MG/1
5 TABLET, FILM COATED ORAL DAILY
Qty: 30 TABLET | Refills: 5 | Status: SHIPPED | OUTPATIENT
Start: 2022-10-24 | End: 2022-10-24 | Stop reason: SDUPTHER

## 2022-10-24 RX ORDER — DAPAGLIFLOZIN 5 MG/1
5 TABLET, FILM COATED ORAL DAILY
Qty: 30 TABLET | Refills: 5 | Status: SHIPPED | OUTPATIENT
Start: 2022-10-24 | End: 2022-12-07 | Stop reason: SDUPTHER

## 2022-10-24 NOTE — PROGRESS NOTES
MARICHUY Leonardo   [facility]  [address] [address]      PATIENT NAME: Fernanda Singh  : 1960  DATE: 10/24/22  MRN: [id number]      Billing Provider: MARICHUY Leonardo  Level of Service:   Patient PCP Information       Provider PCP Type    MARICHUY Leonardo General            Reason for Visit / Chief Complaint: Follow-up (Post Agudelo)       History of Present Illness / Problem Focused Workflow     Fernanda Singh presents to the clinic with Follow-up (Post Agudelo)     Previous PCP: Rachel Goff 2020   PmHx: mild CAD, hypertension, GERD, CKD, chronic tobacco use, obesity, breast mass (), uterine CA (2017), H.Pylori (), colon polyps  SHx: left breast lumpectomy (), thyroid bx (2019), tubal, total hysterectomy (), EGD , colonoscopy 2019--hyperplastic polyp   FHx: Esophageal cancer (brother), cirrhosis (brother), Renal failure, heart dz, HF, HTN, HLD   Complaints today: Establish care     Ms. Michael is a pleasant 61 yo female presenting to re-establish primary care. Has not followed up with a PCP since . States previous PCP moved. She is following [facility] here for CAD and CHF (per report). Also following Heme/Onc and GYN clinics for h/o uterine ca diagnosed in . She had a left breast mass removed in . [facility] path was benign. She is UTD on colon cancer screening. H/o colon polyp. Recommended repeat colonoscopy 2024. She has a h/o diabetes diagnosed ~1-2 years ago. Was given Glipizide by last PCP before she moved. No recent A1c on file. H/o goiter with thyroid nodules. [facility] had a thyroid bx 2019 that was negative per report. She needs a refill on Glipizide. No other problems stated.    Health Maintenance:   Colon Ca Screening-colonoscopy 2019--hyperplastic polyp  Breast Ca Screening-Mammo 2020, benign   Bone Density 2020 WNL    (21): Pt presenting to  "review labs. Since last visit, she was seen in [facility] 4/14/21. Underwent fundus photo which was negative for DR.    Lab review:      A1c 6.8   LDL 48   TSH WNL   RBCs on UA. Noted intermittently since 2019.     Thyroid US 4/29/21 revealed a multinodular thyroid with at least one thyroid nodule on left side (1.9 cm) meeting the criteria for FNA.     Pt c/o right hand pain and numbness to first three fingers. Concerned about CTS. States remodeling home and some of the tools have been difficult to use. C/o stiffness and inability to close hand into a fist on occasion upon awakening.   Denies fever, chills, cough, chest pain, SOB, abd pain, dysuria, gross hematuria, leg pain, or any other concerns.      (10/15/21): Pt presenting for routine f/u.   Lab review:   HgA1c 5.7%      Renal indices stable   FLP WNL   Hgb 11.9-stable and improved   RBCs on UA 6-10   She underwent CT A/P in June 2/2 [facility]. No overt path on CT other than renal cyst of the lower right kidney. Urine cytology negative. States GYN explained that RBCS in UA likely due to vaginal irritation/inflammation 2/2 radiation tx. No gross hematuria.     C/o lower back pain x 1 mth. Long-standing hx of back pain but worse over past month. No overt injuries/accidents or anything "out of the norm." States pain worse at night, occasionally awakening her from her sleep. Exacerbated by standing > 10 min or strenuous activity over the course of the day. Denies falls, lower ext weakness, B/B incontinence. Relieved with rest and Naprosyn PRN--takes at night. States years since last imaging.     Bp slightly elevated. Asymptomatic. Taking medications as prescribed. F/u with Cards today.    (5/11/22): Pt presenting for routine f/u. She had labs completed in 4/2022 but missed a f/u to review. She was referred to ENT last April for a multinodular thyroid with one meeting criteria for FNA but didn't make appt. Also states she needs to call to re-schedule appts in " "GYN, oncologic GYN in [facility], and [facility] clinic. She had a negative screening mammogram (BI-RAD 2) 1/5/2022. Today, she continues with lower back pain as mentioned at last visit. She was referred to P.T. but ultimately declined appt at the time. Long-standing hx of back pain but worse over past month. No overt injuries/accidents or anything "out of the norm." States pain worse at night, occasionally awakening her from her sleep. Exacerbated by standing > 10 min or strenuous activity over the course of the day. Denies falls, lower ext weakness, B/B incontinence. Relieved with rest and Naprosyn PRN--takes at night. She's also used Flexeril in the past with some relief.    (7/14/22): Pt presenting for routine f/u. Bp elevated at last visit; at goal today. Taking medications as prescribed. Previously referred to PT for chronic lower back pain ongoing at least 10 years. Now following [facility] in [place], [place]. Initial eval 5/31/22. Plans were for PT 3x/week x 6 weeks (with certification period ending 7/11/22). Pt states she has only been able to afford PT once a week. States "somewhat" helpful. No saddle anesthesia, B/B incontinence, or falls. Requesting refill on Baclofen. She's seen GYN and ENT since last appt. Stable and doing well. No other concerns.    10/24/22: Patient is presenting for f/u following a brief hospitalization in September and ED visit earlier this month. Patient had a recent hospital admission on September 26, 2022 due to complaints of chest pain and shortness of breath. She was admitted to r/o ACS. She underwent an inpatient Lexiscan stress test which was abnormal. She subsequently underwent a coronary angiogram on September 28, 2022 which revealed normal coronary arteries. She also completed an echocardiogram while inpatient which revealed an ejection fraction of 60%. She presented back to the emergency room on October 13, 2022 with complaints of dizziness and chest pain. She states she was given " "nitroglycerin via EMS in route to the emergency room which relieved her chest pain. She's been seen in [facility] clinic on 10/18/22. Of note, patient's A1c increased to 7.7% (9/27/22) from 6.4% at last visit (at goal with Glipizide), and her glucose has been running in the 200s-300s. She's been started on Metformin 500 mg po BID, in addition to, Glipizide 10 mg po daily. She is also taking Losartan. Previously unable to tolerate ACE-I. Tolerating ARB okay. Admits glucose still "up and down since starting Metformin." States feels Glipizide needs to be changed. At this time, she's asymptomatic. No other concerns.     Review of Systems     Review of Systems   Musculoskeletal: Positive for back pain.   All other systems reviewed and are negative.      Medical / Social / Family History     Past Medical History:   Diagnosis Date    Cancer 2018    CHF (congestive heart failure) 2012    Diabetes mellitus 2019    Disorder of kidney and ureter 2013    GERD (gastroesophageal reflux disease) 2013    Hypertension 2011    Sleep apnea 2013       Past Surgical History:   Procedure Laterality Date    ANGIOGRAM, CORONARY, WITH LEFT HEART CATHETERIZATION N/A 9/28/2022    Procedure: Angiogram, Coronary, with Left Heart Cath;  Surgeon: Milton Connolly MD;  Location: [facility] CATH LAB;  Service: Cardiology;  Laterality: N/A;    BILATERAL TUBAL LIGATION      Biopsy Gastrointestinal  11/13/2019    BIOPSY OF THYROID  07/2019    Catheter [facility]      [id number]    CERVICAL CONIZATION   W/ LASER  05/01/2018    COLONOSCOPY  05/10/2019    COLONOSCOPY  09/24/2014    DILATION AND CURETTAGE OF UTERUS  05/01/2018    ESOPHAGOGASTRODUODENOSCOPY  09/24/2014    ESOPHAGOGASTRODUODENOSCOPY  11/13/2019    Left breast small mass removal      Myomectomy Hysteroscopic  05/01/2018    POLYPECTOMY  05/10/2019    POLYPECTOMY  09/24/2014    TUBAL LIGATION         Social History  Ms.  reports that she has been smoking cigarettes. She has a 3.75 pack-year smoking " history. She has never used smokeless tobacco. She reports that she does not currently use alcohol after a past usage of about 1.0 standard drink per week. She reports that she does not currently use drugs.    Family History  Ms.'s family history includes Cirrhosis in her brother; Diabetes in her father; Esophageal cancer in her brother; Heart attack in her father; Heart failure in her mother; Hypertension in her brother, father, and mother; Kidney failure in her mother; Leukemia in her brother.    Medications and Allergies     Medications  Medication List with Changes/Refills   New Medications    DAPAGLIFLOZIN (FARXIGA) 5 MG TAB TABLET    Take 1 tablet (5 mg total) by mouth once daily.   Current Medications    AMLODIPINE (NORVASC) 10 MG TABLET    Take 1 tablet (10 mg total) by mouth once daily.    ASPIRIN (ECOTRIN) 81 MG EC TABLET    Take 1 tablet (81 mg total) by mouth once daily.    BACLOFEN (LIORESAL) 10 MG TABLET    Take 1 tablet (10 mg total) by mouth 3 (three) times daily as needed (muscle spasm).    BUPROPION (WELLBUTRIN XL) 150 MG TB24 TABLET    Take 1 tablet (150 mg total) by mouth 2 (two) times a day.    CARVEDILOL (COREG) 25 MG TABLET    Take 1 tablet (25 mg total) by mouth 2 (two) times a day.    FUROSEMIDE (LASIX) 40 MG TABLET    Take 1 tablet (40 mg total) by mouth once daily.    GABAPENTIN (NEURONTIN) 600 MG TABLET    Take 1 tablet (600 mg total) by mouth 3 (three) times daily.    HYDRALAZINE (APRESOLINE) 50 MG TABLET    Take 1 tablet (50 mg total) by mouth 2 (two) times daily.    LOSARTAN (COZAAR) 25 MG TABLET    Take 1 tablet (25 mg total) by mouth once daily.    METFORMIN (GLUCOPHAGE) 500 MG TABLET    Take 1 tablet (500 mg total) by mouth 2 (two) times daily with meals.    NITROGLYCERIN (NITROSTAT) 0.4 MG SL TABLET    Place 1 tablet (0.4 mg total) under the tongue every 5 (five) minutes as needed for Chest pain.    ROSUVASTATIN (CRESTOR) 10 MG TABLET    Take 1 tablet (10 mg total) by mouth every  evening.   Discontinued Medications    GLIPIZIDE (GLUCOTROL) 10 MG TABLET    Take 1 tablet (10 mg total) by mouth daily with breakfast.       Allergies  Review of patient's allergies indicates:   Allergen Reactions    Lisinopril Swelling     Other reaction(s): Angioedema of lips       Physical Examination     Vitals:    10/24/22 0827   BP: 134/84   Pulse: 65   Resp: 20   Temp: 98.1 °F (36.7 °C)     Physical Exam  Constitutional:       Appearance: Normal appearance.   HENT:      Head: Normocephalic and atraumatic.      Right Ear: Tympanic membrane, ear canal and external ear normal.      Left Ear: Tympanic membrane, ear canal and external ear normal.      Nose: Nose normal.      Mouth/Throat:      Mouth: Mucous membranes are moist.      Pharynx: Oropharynx is clear.   Eyes:      Extraocular Movements: Extraocular movements intact.      Conjunctiva/sclera: Conjunctivae normal.      Pupils: Pupils are equal, round, and reactive to light.   Cardiovascular:      Rate and Rhythm: Normal rate and regular rhythm.      Pulses: Normal pulses.      Heart sounds: Normal heart sounds.   Pulmonary:      Effort: Pulmonary effort is normal.      Breath sounds: Normal breath sounds.   Abdominal:      General: Bowel sounds are normal.      Palpations: Abdomen is soft.   Musculoskeletal:         General: Normal range of motion.      Cervical back: Normal range of motion and neck supple.      Lumbar back: Tenderness present.   Skin:     General: Skin is warm and dry.   Neurological:      General: No focal deficit present.      Mental Status: She is alert and oriented to person, place, and time.   Psychiatric:         Mood and Affect: Mood normal.         Behavior: Behavior normal.         Thought Content: Thought content normal.         Judgment: Judgment normal.           Results     Lab Results   Component Value Date    WBC 8.3 10/13/2022    RBC 5.02 10/13/2022    HGB 12.8 10/13/2022    HCT 39.7 10/13/2022    MCV 79.1 (L) 10/13/2022     MCH 25.5 (L) 10/13/2022    MCHC 32.2 (L) 10/13/2022    RDW 15.7 10/13/2022     10/13/2022    MPV 10.7 (H) 10/13/2022     CMP  Sodium Level   Date Value Ref Range Status   10/13/2022 138 136 - 145 mmol/L Final     Potassium Level   Date Value Ref Range Status   10/13/2022 3.5 3.5 - 5.1 mmol/L Final     Carbon Dioxide   Date Value Ref Range Status   10/13/2022 22 (L) 23 - 31 mmol/L Final     Blood Urea Nitrogen   Date Value Ref Range Status   10/13/2022 15.8 9.8 - 20.1 mg/dL Final     Creatinine   Date Value Ref Range Status   10/13/2022 0.98 0.55 - 1.02 mg/dL Final     Calcium Level Total   Date Value Ref Range Status   10/13/2022 10.1 8.4 - 10.2 mg/dL Final     Albumin Level   Date Value Ref Range Status   10/13/2022 4.0 3.4 - 4.8 gm/dL Final     Bilirubin Total   Date Value Ref Range Status   10/13/2022 0.5 <=1.5 mg/dL Final     Alkaline Phosphatase   Date Value Ref Range Status   10/13/2022 127 40 - 150 unit/L Final     Aspartate Aminotransferase   Date Value Ref Range Status   10/13/2022 10 5 - 34 unit/L Final     Alanine Aminotransferase   Date Value Ref Range Status   10/13/2022 17 0 - 55 unit/L Final     Estimated GFR-Non    Date Value Ref Range Status   04/13/2022 84 >=90      Lab Results   Component Value Date    CHOL 115 04/13/2022     Lab Results   Component Value Date    HDL 45 04/13/2022     No results found for: LDLCALC  Lab Results   Component Value Date    TRIG 72 04/13/2022     No results found for: CHOLHDL  Lab Results   Component Value Date    TSH 0.7531 04/13/2022     Lab Results   Component Value Date    PHUR 6.0 04/13/2022    PROTEINUA Negative 10/13/2022    GLUCUA Normal 04/13/2022    KETONESU Negative 04/13/2022    OCCULTUA Trace 04/13/2022    NITRITE Negative 04/13/2022    LEUKOCYTESUR Negative 10/13/2022           Assessment and Plan (including Health Maintenance)     Plan:         Health Maintenance Due   Topic Date Due    Cervical Cancer Screening  Never done     COVID-19 Vaccine (3 - Booster for Moderna series) 06/04/2021       Problem List Items Addressed This Visit          Cardiac/Vascular    Primary hypertension    Overview       Currently on Hydralazine 50mg BID, Carvedilol 25mg BID, Amlodipine 10mg every day, Losartan 25 mg po daily         Current Assessment & Plan     Continue medications  DASH         Mild CAD    Current Assessment & Plan     Asymptomatic at present  Continue Tight BP control, statin therapy, and improve glucose control            Endocrine    Diabetes    Overview     Current medications: Glipizide 10 mg po daily, Metformin 500 mg po BID  A1c level: 7.7% 9/27/22, previously 6.4%, goal <7  CBG trends: 200-300  Educated on diabetic diet: Low-fat, Low-carb, Low-cholesterol. Avoid sugary/dark drinks/sodas and white foods (i.e., bread, pasta, potatoes, rice)  Aerobic exercise: 20-30 min/day x 5 days/week  Diabetic Eye Exam: fundus 5/2022, no DR  Diabetic Foot Exam: 5/11/22, WNL  Microalbumin-U: slightly elevated  Kidney Protection: ARB  Statin Therapy: Yes, LDL 50s             Current Assessment & Plan     Medication Adjustments:   STOP GLIPIZIDE  Start Farxiga 5 mg po daily. Call if mycotic or UTI s/s develop. Drink plenty of water  Continue Metformin 500 mg po BID  RTC 6 weeks with POC A1c         Relevant Medications    dapagliflozin (FARXIGA) 5 mg Tab tablet    Other Relevant Orders    Hemoglobin A1C, POCT       Other    Wellness examination    Overview     Health Maintenance:  Colon Ca Screening-colonoscopy 5/2019--hyperplastic polyp  Breast Ca Screening-Mammo 1/5/22, benign  Bone Density 12/2020 WNL  S/p total hysterectomy 2/2 uterine cancer            Other Visit Diagnoses       Need for vaccination    -  Primary    Relevant Orders    Influenza - Quadrivalent (PF) (Completed)    Pneumococcal Conjugate Vaccine (20 Valent) (IM) (Completed)    Other chest pain                Health Maintenance Topics with due status: Not Due       Topic Last  Completion Date    Mammogram 2022    Diabetes Urine Screening 2022    Lipid Panel 2022    Foot Exam 2022    Eye Exam 2022    Colorectal Cancer Screening 2022    Hemoglobin A1c 2022    High Dose Statin 10/18/2022       Future Appointments   Date Time Provider Department Center   2022  9:00 AM CV [facility] CARDIAC MONITOR  [facility] CARDIJOSE ED[facility]   2022  8:45 AM MARICHUY Leonardo [facility]   2023  9:30 AM MARICHUY Leonardo [facility]   2023  2:00 PM [place] Coates, MARCOS [facility] CARD [facility]            Signature:  MARICHUY Leonardo  [facility]  [address] [address]    Date of encounter: 10/24/22    MARICHUY Leonardo   [facility]  [address] [address]      PATIENT NAME: Fernanda Singh  : 1960  DATE: 10/24/22  MRN: [id number]      Billing Provider: MARICHUY Leonardo  Level of Service:   Patient PCP Information       Provider PCP Type    MARICHUY Leonardo General            Reason for Visit / Chief Complaint: Follow-up (Post Agudelo)       History of Present Illness / Problem Focused Workflow     Fernanda Singh presents to the clinic with Follow-up (Post Agudelo)     Initial Visit (3/30/21): 60 y.o. AA female presenting to [facility] to establish primary care.   Previous PCP: Rachel Goff 2020   PmHx: mild CAD, hypertension, GERD, CKD, chronic tobacco use, obesity, breast mass (), uterine CA (2017), H.Pylori (), colon polyps  SHx: left breast lumpectomy (), thyroid bx (2019), tubal, total hysterectomy (), EGD , colonoscopy 2019--hyperplastic polyp   FHx: Esophageal cancer (brother), cirrhosis (brother), Renal failure, heart dz, HF, HTN, HLD   Complaints today: Establish care     Ms. Michael is a pleasant 59 yo female  presenting to re-establish primary care. Has not followed up with a PCP since 2020. States previous PCP moved. She is following [facility] clinic here for CAD and CHF (per report). Also following Heme/Onc and GYN clinics for h/o uterine ca diagnosed in 2017. She had a left breast mass removed in 2016. States path was benign. She is UTD on colon cancer screening. H/o colon polyp. Recommended repeat colonoscopy 5/2024. She has a h/o diabetes diagnosed ~1-2 years ago. Was given Glipizide by last PCP before she moved. No recent A1c on file. H/o goiter with thyroid nodules. [facility] had a thyroid bx 7/2019 that was negative per report. She needs a refill on Glipizide. No other problems stated.    Health Maintenance:   Colon Ca Screening-colonoscopy 5/2019--hyperplastic polyp  Breast Ca Screening-Mammo 12/2020, benign   Bone Density 12/2020 WNL    (4/30/21): Pt presenting to review labs. Since last visit, she was seen in [facility] 4/14/21. Underwent fundus photo which was negative for DR.    Lab review:      A1c 6.8   LDL 48   TSH WNL   RBCs on UA. Noted intermittently since 2019.     Thyroid US 4/29/21 revealed a multinodular thyroid with at least one thyroid nodule on left side (1.9 cm) meeting the criteria for FNA.     Pt c/o right hand pain and numbness to first three fingers. Concerned about CTS. States remodeling home and some of the tools have been difficult to use. C/o stiffness and inability to close hand into a fist on occasion upon awakening.   Denies fever, chills, cough, chest pain, SOB, abd pain, dysuria, gross hematuria, leg pain, or any other concerns.    (10/15/21): Pt presenting for routine f/u.   Lab review:   HgA1c 5.7%      Renal indices stable   FLP WNL   Hgb 11.9-stable and improved   RBCs on UA 6-10   She underwent CT A/P in June 2/2 [facility]. No overt path on CT other than renal cyst of the lower right kidney. Urine cytology negative. [facility] GYN explained that RBCS in UA likely due to vaginal  "irritation/inflammation 2/2 radiation tx. No gross hematuria.     C/o lower back pain x 1 mth. Long-standing hx of back pain but worse over past month. No overt injuries/accidents or anything "out of the norm." States pain worse at night, occasionally awakening her from her sleep. Exacerbated by standing > 10 min or strenuous activity over the course of the day. Denies falls, lower ext weakness, B/B incontinence. Relieved with rest and Naprosyn PRN--takes at night. States years since last imaging.     Bp slightly elevated. Asymptomatic. Taking medications as prescribed. F/u with Cards today.    (5/11/22): Pt presenting for routine f/u. She had labs completed in 4/2022 but missed a f/u to review. She was referred to ENT last April for a multinodular thyroid with one meeting criteria for FNA but didn't make appt. Also states she needs to call to re-schedule appts in GYN, oncologic GYN in [facility], and [facility] clinic. She had a negative screening mammogram (BI-RAD 2) 1/5/2022. Today, she continues with lower back pain as mentioned at last visit. She was referred to P.T. but ultimately declined appt at the time. Long-standing hx of back pain but worse over past month. No overt injuries/accidents or anything "out of the norm." States pain worse at night, occasionally awakening her from her sleep. Exacerbated by standing > 10 min or strenuous activity over the course of the day. Denies falls, lower ext weakness, B/B incontinence. Relieved with rest and Naprosyn PRN--takes at night. She's also used Flexeril in the past with some relief    (7/14/22): Pt presenting for routine f/u. Bp elevated at last visit; at goal today. Taking medications as prescribed. Previously referred to PT for chronic lower back pain ongoing at least 10 years. Now following [facility] in [place], [place]. Initial eval 5/31/22. Plans were for PT 3x/week x 6 weeks (with certification period ending 7/11/22). Pt states she has only been able to afford PT once a " "week. States "somewhat" helpful. No saddle anesthesia, B/B incontinence, or falls. Requesting refill on Baclofen. She's seen GYN and ENT since last appt. Stable and doing well. No other concerns.    10/24/22: Patient is presenting for f/u following a brief hospitalization in September and ED visit earlier this month. Patient had a recent hospital admission on September 26, 2022 due to complaints of chest pain and shortness of breath. She was admitted to r/o ACS. She underwent an inpatient Lexiscan stress test which was abnormal. She subsequently underwent a coronary angiogram on September 28, 2022 which revealed normal coronary arteries. She also completed an echocardiogram while inpatient which revealed an ejection fraction of 60%. She presented back to the emergency room on October 13, 2022 with complaints of dizziness and chest pain. She states she was given nitroglycerin via EMS in route to the emergency room which relieved her chest pain. She's been seen in [facility] clinic on 10/18/22. Of note, patient's A1c increased to 7.7% (9/27/22) from 6.4% at last visit (at goal with Glipizide), and her glucose has been running in the 200s-300s. She's been started on Metformin 500 mg po BID, in addition to, Glipizide 10 mg po daily. She is also taking Losartan. Previously unable to tolerate ACE-I. Tolerating ARB okay. Admits glucose still "up and down since starting Metformin." States feels Glipizide needs to be changed. At this time, she's asymptomatic. No other concerns.       Review of Systems     Review of Systems   Respiratory:  Negative for shortness of breath.    Cardiovascular:  Negative for chest pain, palpitations and leg swelling.   All other systems reviewed and are negative.    Medical / Social / Family History     Past Medical History:   Diagnosis Date    Cancer 2018    CHF (congestive heart failure) 2012    Diabetes mellitus 2019    Disorder of kidney and ureter 2013    GERD (gastroesophageal reflux disease) " 2013    Hypertension 2011    Sleep apnea 2013       Past Surgical History:   Procedure Laterality Date    ANGIOGRAM, CORONARY, WITH LEFT HEART CATHETERIZATION N/A 9/28/2022    Procedure: Angiogram, Coronary, with Left Heart Cath;  Surgeon: Milton Connolly MD;  Location: [facility] CATH LAB;  Service: Cardiology;  Laterality: N/A;    BILATERAL TUBAL LIGATION      Biopsy Gastrointestinal  11/13/2019    BIOPSY OF THYROID  07/2019    [facility]      [id number]    CERVICAL CONIZATION   W/ LASER  05/01/2018    COLONOSCOPY  05/10/2019    COLONOSCOPY  09/24/2014    DILATION AND CURETTAGE OF UTERUS  05/01/2018    ESOPHAGOGASTRODUODENOSCOPY  09/24/2014    ESOPHAGOGASTRODUODENOSCOPY  11/13/2019    Left breast small mass removal      Myomectomy Hysteroscopic  05/01/2018    POLYPECTOMY  05/10/2019    POLYPECTOMY  09/24/2014    TUBAL LIGATION         Social History  Ms.  reports that she has been smoking cigarettes. She has a 3.75 pack-year smoking history. She has never used smokeless tobacco. She reports that she does not currently use alcohol after a past usage of about 1.0 standard drink per week. She reports that she does not currently use drugs.    Family History  Ms.'s family history includes Cirrhosis in her brother; Diabetes in her father; Esophageal cancer in her brother; Heart attack in her father; Heart failure in her mother; Hypertension in her brother, father, and mother; Kidney failure in her mother; Leukemia in her brother.    Medications and Allergies     Medications  Medication List with Changes/Refills   New Medications    DAPAGLIFLOZIN (FARXIGA) 5 MG TAB TABLET    Take 1 tablet (5 mg total) by mouth once daily.   Current Medications    AMLODIPINE (NORVASC) 10 MG TABLET    Take 1 tablet (10 mg total) by mouth once daily.    ASPIRIN (ECOTRIN) 81 MG EC TABLET    Take 1 tablet (81 mg total) by mouth once daily.    BACLOFEN (LIORESAL) 10 MG TABLET    Take 1 tablet (10 mg total) by mouth 3 (three) times  daily as needed (muscle spasm).    BUPROPION (WELLBUTRIN XL) 150 MG TB24 TABLET    Take 1 tablet (150 mg total) by mouth 2 (two) times a day.    CARVEDILOL (COREG) 25 MG TABLET    Take 1 tablet (25 mg total) by mouth 2 (two) times a day.    FUROSEMIDE (LASIX) 40 MG TABLET    Take 1 tablet (40 mg total) by mouth once daily.    GABAPENTIN (NEURONTIN) 600 MG TABLET    Take 1 tablet (600 mg total) by mouth 3 (three) times daily.    HYDRALAZINE (APRESOLINE) 50 MG TABLET    Take 1 tablet (50 mg total) by mouth 2 (two) times daily.    LOSARTAN (COZAAR) 25 MG TABLET    Take 1 tablet (25 mg total) by mouth once daily.    METFORMIN (GLUCOPHAGE) 500 MG TABLET    Take 1 tablet (500 mg total) by mouth 2 (two) times daily with meals.    NITROGLYCERIN (NITROSTAT) 0.4 MG SL TABLET    Place 1 tablet (0.4 mg total) under the tongue every 5 (five) minutes as needed for Chest pain.    ROSUVASTATIN (CRESTOR) 10 MG TABLET    Take 1 tablet (10 mg total) by mouth every evening.   Discontinued Medications    GLIPIZIDE (GLUCOTROL) 10 MG TABLET    Take 1 tablet (10 mg total) by mouth daily with breakfast.       Allergies  Review of patient's allergies indicates:   Allergen Reactions    Lisinopril Swelling     Other reaction(s): Angioedema of lips       Physical Examination     Vitals:    10/24/22 0827   BP: 134/84   Pulse: 65   Resp: 20   Temp: 98.1 °F (36.7 °C)     Physical Exam  Constitutional:       Appearance: Normal appearance.   HENT:      Head: Normocephalic and atraumatic.      Mouth/Throat:      Mouth: Mucous membranes are moist.   Eyes:      Extraocular Movements: Extraocular movements intact.      Conjunctiva/sclera: Conjunctivae normal.      Pupils: Pupils are equal, round, and reactive to light.   Cardiovascular:      Rate and Rhythm: Normal rate and regular rhythm.      Pulses: Normal pulses.      Heart sounds: Normal heart sounds.   Pulmonary:      Effort: Pulmonary effort is normal.      Breath sounds: Normal breath sounds.    Musculoskeletal:         General: Normal range of motion.      Cervical back: Normal range of motion.      Right lower leg: No edema.      Left lower leg: No edema.   Skin:     General: Skin is warm and dry.   Neurological:      General: No focal deficit present.      Mental Status: She is alert and oriented to person, place, and time.   Psychiatric:         Mood and Affect: Mood normal.         Behavior: Behavior normal.         Thought Content: Thought content normal.         Judgment: Judgment normal.         Results     Lab Results   Component Value Date    WBC 8.3 10/13/2022    RBC 5.02 10/13/2022    HGB 12.8 10/13/2022    HCT 39.7 10/13/2022    MCV 79.1 (L) 10/13/2022    MCH 25.5 (L) 10/13/2022    MCHC 32.2 (L) 10/13/2022    RDW 15.7 10/13/2022     10/13/2022    MPV 10.7 (H) 10/13/2022     CMP  Sodium Level   Date Value Ref Range Status   10/13/2022 138 136 - 145 mmol/L Final     Potassium Level   Date Value Ref Range Status   10/13/2022 3.5 3.5 - 5.1 mmol/L Final     Carbon Dioxide   Date Value Ref Range Status   10/13/2022 22 (L) 23 - 31 mmol/L Final     Blood Urea Nitrogen   Date Value Ref Range Status   10/13/2022 15.8 9.8 - 20.1 mg/dL Final     Creatinine   Date Value Ref Range Status   10/13/2022 0.98 0.55 - 1.02 mg/dL Final     Calcium Level Total   Date Value Ref Range Status   10/13/2022 10.1 8.4 - 10.2 mg/dL Final     Albumin Level   Date Value Ref Range Status   10/13/2022 4.0 3.4 - 4.8 gm/dL Final     Bilirubin Total   Date Value Ref Range Status   10/13/2022 0.5 <=1.5 mg/dL Final     Alkaline Phosphatase   Date Value Ref Range Status   10/13/2022 127 40 - 150 unit/L Final     Aspartate Aminotransferase   Date Value Ref Range Status   10/13/2022 10 5 - 34 unit/L Final     Alanine Aminotransferase   Date Value Ref Range Status   10/13/2022 17 0 - 55 unit/L Final     Estimated GFR-Non    Date Value Ref Range Status   04/13/2022 84 >=90      Lab Results   Component Value Date     CHOL 115 04/13/2022     Lab Results   Component Value Date    HDL 45 04/13/2022     No results found for: LDLCALC  Lab Results   Component Value Date    TRIG 72 04/13/2022     No results found for: CHOLHDL  Lab Results   Component Value Date    TSH 0.7531 04/13/2022     Lab Results   Component Value Date    PHUR 6.0 04/13/2022    PROTEINUA Negative 10/13/2022    GLUCUA Normal 04/13/2022    KETONESU Negative 04/13/2022    OCCULTUA Trace 04/13/2022    NITRITE Negative 04/13/2022    LEUKOCYTESUR Negative 10/13/2022           Assessment and Plan (including Health Maintenance)     Plan:         Health Maintenance Due   Topic Date Due    Cervical Cancer Screening  Never done    COVID-19 Vaccine (3 - Booster for Moderna series) 06/04/2021       Problem List Items Addressed This Visit          Cardiac/Vascular    Primary hypertension    Overview       Currently on Hydralazine 50mg BID, Carvedilol 25mg BID, Amlodipine 10mg every day, Losartan 25 mg po daily         Current Assessment & Plan     Continue medications  DASH         Mild CAD    Current Assessment & Plan     Asymptomatic at present  Continue Tight BP control, statin therapy, and improve glucose control            Endocrine    Diabetes    Overview     Current medications: Glipizide 10 mg po daily, Metformin 500 mg po BID  A1c level: 7.7% 9/27/22, previously 6.4%, goal <7  CBG trends: 200-300  Educated on diabetic diet: Low-fat, Low-carb, Low-cholesterol. Avoid sugary/dark drinks/sodas and white foods (i.e., bread, pasta, potatoes, rice)  Aerobic exercise: 20-30 min/day x 5 days/week  Diabetic Eye Exam: fundus 5/2022, no DR  Diabetic Foot Exam: 5/11/22, WNL  Microalbumin-U: slightly elevated  Kidney Protection: ARB  Statin Therapy: Yes, LDL 50s             Current Assessment & Plan     Medication Adjustments:   STOP GLIPIZIDE  Start Farxiga 5 mg po daily. Call if mycotic or UTI s/s develop. Drink plenty of water  Continue Metformin 500 mg po BID  RTC 6 weeks  with POC A1c         Relevant Medications    dapagliflozin (FARXIGA) 5 mg Tab tablet    Other Relevant Orders    Hemoglobin A1C, POCT       Other    Wellness examination    Overview     Health Maintenance:  Colon Ca Screening-colonoscopy 5/2019--hyperplastic polyp  Breast Ca Screening-Mammo 1/5/22, benign  Bone Density 12/2020 WNL  S/p total hysterectomy 2/2 uterine cancer            Other Visit Diagnoses       Need for vaccination    -  Primary    Relevant Orders    Influenza - Quadrivalent (PF) (Completed)    Pneumococcal Conjugate Vaccine (20 Valent) (IM) (Completed)    Other chest pain                Health Maintenance Topics with due status: Not Due       Topic Last Completion Date    Mammogram 01/05/2022    Diabetes Urine Screening 04/13/2022    Lipid Panel 04/13/2022    Foot Exam 05/11/2022    Eye Exam 05/11/2022    Colorectal Cancer Screening 05/21/2022    Hemoglobin A1c 09/27/2022    High Dose Statin 10/18/2022       Future Appointments   Date Time Provider Department Center   11/29/2022  9:00 AM CV [facility] CARDIAC MONITOR 01 [facility] AJAY [facility]   12/7/2022  8:45 AM [facility]   1/17/2023  9:30 AM MARICHUY Leonardo G[facility]GE [facility]   1/17/2023  2:00 PM MARCOS [facility] CARD [place]        I spent a total of 41 minutes on the day of the visit.  This includes face to face time and non-face to face time preparing to see the patient (eg, review of tests), obtaining and/or reviewing separately obtained history, documenting clinical information in the electronic or other health record, independently interpreting results and communicating results to the patient/family/caregiver, or care coordinator.      Signature:  MARICHUY Leonardo  [facility]  [address] [id number]    Date of encounter: 10/24/22

## 2022-10-24 NOTE — ASSESSMENT & PLAN NOTE
Medication Adjustments:   STOP GLIPIZIDE  Start Farxiga 5 mg po daily. Call if mycotic or UTI s/s develop. Drink plenty of water  Continue Metformin 500 mg po BID  RTC 6 weeks with POC A1c

## 2022-11-29 ENCOUNTER — HOSPITAL ENCOUNTER (OUTPATIENT)
Dept: CARDIOLOGY | Facility: HOSPITAL | Age: 62
Discharge: HOME | End: 2022-11-29
Attending: NURSE PRACTITIONER
Payer: MEDICARE

## 2022-11-29 DIAGNOSIS — R00.2 PALPITATIONS: ICD-10-CM

## 2022-11-29 PROCEDURE — 93225 XTRNL ECG REC<48 HRS REC: CPT

## 2022-12-05 ENCOUNTER — LAB VISIT (OUTPATIENT)
Dept: LAB | Facility: HOSPITAL | Age: 62
End: 2022-12-05
Attending: NURSE PRACTITIONER
Payer: MEDICARE

## 2022-12-05 DIAGNOSIS — E11.9 TYPE 2 DIABETES MELLITUS WITHOUT COMPLICATION, WITHOUT LONG-TERM CURRENT USE OF INSULIN: ICD-10-CM

## 2022-12-05 LAB
ALBUMIN SERPL-MCNC: 3.7 GM/DL (ref 3.4–4.8)
ALBUMIN/GLOB SERPL: 0.9 RATIO (ref 1.1–2)
ALP SERPL-CCNC: 118 UNIT/L (ref 40–150)
ALT SERPL-CCNC: 20 UNIT/L (ref 0–55)
AST SERPL-CCNC: 11 UNIT/L (ref 5–34)
BASOPHILS # BLD AUTO: 0.03 X10(3)/MCL (ref 0–0.2)
BASOPHILS NFR BLD AUTO: 0.4 %
BILIRUBIN DIRECT+TOT PNL SERPL-MCNC: 0.4 MG/DL
BUN SERPL-MCNC: 13.9 MG/DL (ref 9.8–20.1)
CALCIUM SERPL-MCNC: 9.8 MG/DL (ref 8.4–10.2)
CHLORIDE SERPL-SCNC: 110 MMOL/L (ref 98–107)
CO2 SERPL-SCNC: 21 MMOL/L (ref 23–31)
CREAT SERPL-MCNC: 1.16 MG/DL (ref 0.55–1.02)
EOSINOPHIL # BLD AUTO: 0.17 X10(3)/MCL (ref 0–0.9)
EOSINOPHIL NFR BLD AUTO: 2.2 %
ERYTHROCYTE [DISTWIDTH] IN BLOOD BY AUTOMATED COUNT: 16.2 % (ref 11.5–17)
EST. AVERAGE GLUCOSE BLD GHB EST-MCNC: 134.1 MG/DL
GFR SERPLBLD CREATININE-BSD FMLA CKD-EPI: 54 MLS/MIN/1.73/M2
GLOBULIN SER-MCNC: 4 GM/DL (ref 2.4–3.5)
GLUCOSE SERPL-MCNC: 146 MG/DL (ref 82–115)
HBA1C MFR BLD: 6.3 %
HCT VFR BLD AUTO: 37.6 % (ref 37–47)
HGB BLD-MCNC: 11.6 GM/DL (ref 12–16)
IMM GRANULOCYTES # BLD AUTO: 0.04 X10(3)/MCL (ref 0–0.04)
IMM GRANULOCYTES NFR BLD AUTO: 0.5 %
LYMPHOCYTES # BLD AUTO: 2.64 X10(3)/MCL (ref 0.6–4.6)
LYMPHOCYTES NFR BLD AUTO: 34.5 %
MCH RBC QN AUTO: 25.2 PG (ref 27–31)
MCHC RBC AUTO-ENTMCNC: 30.9 MG/DL (ref 33–36)
MCV RBC AUTO: 81.6 FL (ref 80–94)
MONOCYTES # BLD AUTO: 0.52 X10(3)/MCL (ref 0.1–1.3)
MONOCYTES NFR BLD AUTO: 6.8 %
NEUTROPHILS # BLD AUTO: 4.3 X10(3)/MCL (ref 2.1–9.2)
NEUTROPHILS NFR BLD AUTO: 55.6 %
NRBC BLD AUTO-RTO: 0 %
PLATELET # BLD AUTO: 286 X10(3)/MCL (ref 130–400)
PMV BLD AUTO: 10.6 FL (ref 7.4–10.4)
POTASSIUM SERPL-SCNC: 4.1 MMOL/L (ref 3.5–5.1)
PROT SERPL-MCNC: 7.7 GM/DL (ref 5.8–7.6)
RBC # BLD AUTO: 4.61 X10(6)/MCL (ref 4.2–5.4)
SODIUM SERPL-SCNC: 140 MMOL/L (ref 136–145)
WBC # SPEC AUTO: 7.7 X10(3)/MCL (ref 4.5–11.5)

## 2022-12-05 PROCEDURE — 85025 COMPLETE CBC W/AUTO DIFF WBC: CPT

## 2022-12-05 PROCEDURE — 83036 HEMOGLOBIN GLYCOSYLATED A1C: CPT

## 2022-12-05 PROCEDURE — 36415 COLL VENOUS BLD VENIPUNCTURE: CPT

## 2022-12-05 PROCEDURE — 80053 COMPREHEN METABOLIC PANEL: CPT

## 2022-12-07 ENCOUNTER — OFFICE VISIT (OUTPATIENT)
Dept: INTERNAL MEDICINE | Facility: CLINIC | Age: 62
End: 2022-12-07
Payer: MEDICARE

## 2022-12-07 VITALS
HEART RATE: 60 BPM | TEMPERATURE: 98 F | SYSTOLIC BLOOD PRESSURE: 132 MMHG | BODY MASS INDEX: 44.1 KG/M2 | DIASTOLIC BLOOD PRESSURE: 82 MMHG | WEIGHT: 291 LBS | RESPIRATION RATE: 20 BRPM | HEIGHT: 68 IN

## 2022-12-07 DIAGNOSIS — E11.9 TYPE 2 DIABETES MELLITUS WITHOUT COMPLICATION, WITHOUT LONG-TERM CURRENT USE OF INSULIN: Primary | ICD-10-CM

## 2022-12-07 DIAGNOSIS — Z12.31 ENCOUNTER FOR SCREENING MAMMOGRAM FOR MALIGNANT NEOPLASM OF BREAST: ICD-10-CM

## 2022-12-07 DIAGNOSIS — R94.4 DECREASED GFR: ICD-10-CM

## 2022-12-07 DIAGNOSIS — M54.50 CHRONIC BILATERAL LOW BACK PAIN WITHOUT SCIATICA: ICD-10-CM

## 2022-12-07 DIAGNOSIS — G89.29 CHRONIC BILATERAL LOW BACK PAIN WITHOUT SCIATICA: ICD-10-CM

## 2022-12-07 PROCEDURE — 3066F NEPHROPATHY DOC TX: CPT | Mod: CPTII,,, | Performed by: NURSE PRACTITIONER

## 2022-12-07 PROCEDURE — 3075F PR MOST RECENT SYSTOLIC BLOOD PRESS GE 130-139MM HG: ICD-10-PCS | Mod: CPTII,,, | Performed by: NURSE PRACTITIONER

## 2022-12-07 PROCEDURE — 3075F SYST BP GE 130 - 139MM HG: CPT | Mod: CPTII,,, | Performed by: NURSE PRACTITIONER

## 2022-12-07 PROCEDURE — 1159F PR MEDICATION LIST DOCUMENTED IN MEDICAL RECORD: ICD-10-PCS | Mod: CPTII,,, | Performed by: NURSE PRACTITIONER

## 2022-12-07 PROCEDURE — 4010F ACE/ARB THERAPY RXD/TAKEN: CPT | Mod: CPTII,,, | Performed by: NURSE PRACTITIONER

## 2022-12-07 PROCEDURE — 99215 OFFICE O/P EST HI 40 MIN: CPT | Mod: PBBFAC | Performed by: NURSE PRACTITIONER

## 2022-12-07 PROCEDURE — 3008F PR BODY MASS INDEX (BMI) DOCUMENTED: ICD-10-PCS | Mod: CPTII,,, | Performed by: NURSE PRACTITIONER

## 2022-12-07 PROCEDURE — 3008F BODY MASS INDEX DOCD: CPT | Mod: CPTII,,, | Performed by: NURSE PRACTITIONER

## 2022-12-07 PROCEDURE — 3060F PR POS MICROALBUMINURIA RESULT DOCUMENTED/REVIEW: ICD-10-PCS | Mod: CPTII,,, | Performed by: NURSE PRACTITIONER

## 2022-12-07 PROCEDURE — 3060F POS MICROALBUMINURIA REV: CPT | Mod: CPTII,,, | Performed by: NURSE PRACTITIONER

## 2022-12-07 PROCEDURE — 4010F PR ACE/ARB THEARPY RXD/TAKEN: ICD-10-PCS | Mod: CPTII,,, | Performed by: NURSE PRACTITIONER

## 2022-12-07 PROCEDURE — 1160F PR REVIEW ALL MEDS BY PRESCRIBER/CLIN PHARMACIST DOCUMENTED: ICD-10-PCS | Mod: CPTII,,, | Performed by: NURSE PRACTITIONER

## 2022-12-07 PROCEDURE — 1159F MED LIST DOCD IN RCRD: CPT | Mod: CPTII,,, | Performed by: NURSE PRACTITIONER

## 2022-12-07 PROCEDURE — 1160F RVW MEDS BY RX/DR IN RCRD: CPT | Mod: CPTII,,, | Performed by: NURSE PRACTITIONER

## 2022-12-07 PROCEDURE — 99214 OFFICE O/P EST MOD 30 MIN: CPT | Mod: S$PBB,,, | Performed by: NURSE PRACTITIONER

## 2022-12-07 PROCEDURE — 3066F PR DOCUMENTATION OF TREATMENT FOR NEPHROPATHY: ICD-10-PCS | Mod: CPTII,,, | Performed by: NURSE PRACTITIONER

## 2022-12-07 PROCEDURE — 3079F PR MOST RECENT DIASTOLIC BLOOD PRESSURE 80-89 MM HG: ICD-10-PCS | Mod: CPTII,,, | Performed by: NURSE PRACTITIONER

## 2022-12-07 PROCEDURE — 3079F DIAST BP 80-89 MM HG: CPT | Mod: CPTII,,, | Performed by: NURSE PRACTITIONER

## 2022-12-07 PROCEDURE — 99214 PR OFFICE/OUTPT VISIT, EST, LEVL IV, 30-39 MIN: ICD-10-PCS | Mod: S$PBB,,, | Performed by: NURSE PRACTITIONER

## 2022-12-07 RX ORDER — BACLOFEN 10 MG/1
10 TABLET ORAL 3 TIMES DAILY PRN
Qty: 30 TABLET | Refills: 1 | Status: SHIPPED | OUTPATIENT
Start: 2022-12-07 | End: 2023-02-22

## 2022-12-07 RX ORDER — DAPAGLIFLOZIN 5 MG/1
5 TABLET, FILM COATED ORAL DAILY
Qty: 90 TABLET | Refills: 1 | Status: SHIPPED | OUTPATIENT
Start: 2022-12-07 | End: 2023-04-26 | Stop reason: SDUPTHER

## 2022-12-07 RX ORDER — METFORMIN HYDROCHLORIDE 500 MG/1
500 TABLET ORAL 2 TIMES DAILY WITH MEALS
Qty: 180 TABLET | Refills: 1 | Status: SHIPPED | OUTPATIENT
Start: 2022-12-07 | End: 2023-04-26 | Stop reason: SDUPTHER

## 2022-12-07 NOTE — ASSESSMENT & PLAN NOTE
Slight decrease in eGFR and slight elevation in creatinine. Taking Farxiga with increased urination. Avoid roxie, teas, juices. Increase water intake  BMP 4-8 weeks

## 2022-12-07 NOTE — PROGRESS NOTES
MARICHUY Leonardo   [facility]  [address] [facility] [address]      PATIENT NAME: Fernanda Singh  : 1960  DATE: 22  MRN: [id number]      Billing Provider: MARICHUY Leonardo  Level of Service:   Patient PCP Information       Provider PCP Type    MARICHUY Leonardo General            Reason for Visit / Chief Complaint: Follow-up, Diabetes, and Back Pain       History of Present Illness / Problem Focused Workflow     Fernanda Singh presents to the clinic with Follow-up, Diabetes, and Back Pain     Previous PCP: Rachel Goff 2020   PmHx: mild CAD, hypertension, GERD, CKD, chronic tobacco use, obesity, breast mass (), uterine CA (2017), H.Pylori (), colon polyps  SHx: left breast lumpectomy (), thyroid bx (2019), tubal, total hysterectomy (), EGD , colonoscopy 2019--hyperplastic polyp   FHx: Esophageal cancer (brother), cirrhosis (brother), Renal failure, heart dz, HF, HTN, HLD   Complaints today: Establish care     Ms. Michael is a pleasant 59 yo female presenting to re-establish primary care. Has not followed up with a PCP since . [facility] previous PCP moved. She is following [facility] here for CAD and CHF (per report). Also following Heme/Onc and GYN clinics for h/o uterine ca diagnosed in . She had a left breast mass removed in . [facility] path was benign. She is UTD on colon cancer screening. H/o colon polyp. Recommended repeat colonoscopy 2024. She has a h/o diabetes diagnosed ~1-2 years ago. Was given Glipizide by last PCP before she moved. No recent A1c on file. H/o goiter with thyroid nodules. [facility] had a thyroid bx 2019 that was negative per report. She needs a refill on Glipizide. No other problems stated.    Health Maintenance:   Colon Ca Screening-colonoscopy 2019--hyperplastic polyp  Breast Ca Screening-Mammo 2020, benign   Bone Density 2020  "WNL    (4/30/21): Pt presenting to review labs. Since last visit, she was seen in [facility] 4/14/21. Underwent fundus photo which was negative for DR.    Lab review:      A1c 6.8   LDL 48   TSH WNL   RBCs on UA. Noted intermittently since 2019.     Thyroid US 4/29/21 revealed a multinodular thyroid with at least one thyroid nodule on left side (1.9 cm) meeting the criteria for FNA.     Pt c/o right hand pain and numbness to first three fingers. Concerned about CTS. States remodeling home and some of the tools have been difficult to use. C/o stiffness and inability to close hand into a fist on occasion upon awakening.   Denies fever, chills, cough, chest pain, SOB, abd pain, dysuria, gross hematuria, leg pain, or any other concerns.      (10/15/21): Pt presenting for routine f/u.   Lab review:   HgA1c 5.7%      Renal indices stable   FLP WNL   Hgb 11.9-stable and improved   RBCs on UA 6-10   She [place] CT A/P in [place] 2[place]2 [facility] [place] No overt path on CT other than renal cyst of the lower right kidney. Urine cytology negative. States GYN explained that RBCS in UA likely due to vaginal irritation/inflammation 2/2 radiation tx. No gross hematuria.     C/o lower back pain x 1 mth. Long-standing hx of back pain but worse over past month. No overt injuries/accidents or anything "out of the norm." States pain worse at night, occasionally awakening her from her sleep. Exacerbated by standing > 10 min or strenuous activity over the course of the day. Denies falls, lower ext weakness, B/B incontinence. Relieved with rest and Naprosyn PRN--takes at night. States years since last imaging.     Bp slightly elevated. Asymptomatic. Taking medications as prescribed. F/u with [facility] today.    Today's Visit (5/11/22): Pt presenting for routine f/u. She had labs completed in 4/2022 but missed a f/u to review. She was referred to ENT last April for a multinodular thyroid with one meeting criteria for FNA but didn't make appt. Also " "states she needs to call to re-schedule appts in GYN, oncologic GYN in [place], and [facility] clinic. She had a negative screening mammogram (BI-RAD 2) 1/5/2022. Today, she continues with lower back pain as mentioned at last visit. She was referred to P.T. but ultimately declined appt at the time. Long-standing hx of back pain but worse over past month. No overt injuries/accidents or anything "out of the norm." States pain worse at night, occasionally awakening her from her sleep. Exacerbated by standing > 10 min or strenuous activity over the course of the day. Denies falls, lower ext weakness, B/B incontinence. Relieved with rest and Naprosyn PRN--takes at night. She's also used Flexeril in the past with some relief.        Review of Systems     Review of Systems   Musculoskeletal: Positive for back pain.   All other systems reviewed and are negative.      Medical / Social / Family History     Past Medical History:   Diagnosis Date    Cancer 2018    CHF (congestive heart failure) 2012    Diabetes mellitus 2019    Disorder of kidney and ureter 2013    GERD (gastroesophageal reflux disease) 2013    Hypertension 2011    Sleep apnea 2013       Past Surgical History:   Procedure Laterality Date    ANGIOGRAM, CORONARY, WITH LEFT HEART CATHETERIZATION N/A 9/28/2022    Procedure: Angiogram, Coronary, with Left Heart Cath;  Surgeon: Milton Connolly MD;  Location: [facility] CATH LAB;  Service: Cardiology;  Laterality: N/A;    BILATERAL TUBAL LIGATION      Biopsy Gastrointestinal  11/13/2019    BIOPSY OF THYROID  07/2019    [facility]      [id number]    CERVICAL CONIZATION   W/ LASER  05/01/2018    COLONOSCOPY  05/10/2019    COLONOSCOPY  09/24/2014    DILATION AND CURETTAGE OF UTERUS  05/01/2018    ESOPHAGOGASTRODUODENOSCOPY  09/24/2014    ESOPHAGOGASTRODUODENOSCOPY  11/13/2019    Left breast small mass removal      Myomectomy Hysteroscopic  05/01/2018    POLYPECTOMY  05/10/2019    POLYPECTOMY  09/24/2014    TUBAL " LIGATION         Social History  Ms.  reports that she has been smoking cigarettes. She has never used smokeless tobacco. She reports that she does not currently use alcohol after a past usage of about 1.0 standard drink per week. She reports that she does not currently use drugs.    Family History  Ms.'s family history includes Cirrhosis in her brother; Diabetes in her father; Esophageal cancer in her brother; Heart attack in her father; Heart failure in her mother; Hypertension in her brother, father, and mother; Kidney failure in her mother; Leukemia in her brother.    Medications and Allergies     Medications  Medication List with Changes/Refills   Current Medications    AMLODIPINE (NORVASC) 10 MG TABLET    Take 1 tablet (10 mg total) by mouth once daily.    ASPIRIN (ECOTRIN) 81 MG EC TABLET    Take 1 tablet (81 mg total) by mouth once daily.    BUPROPION (WELLBUTRIN XL) 150 MG TB24 TABLET    Take 1 tablet (150 mg total) by mouth 2 (two) times a day.    CARVEDILOL (COREG) 25 MG TABLET    Take 1 tablet (25 mg total) by mouth 2 (two) times a day.    FUROSEMIDE (LASIX) 40 MG TABLET    Take 1 tablet (40 mg total) by mouth once daily.    GABAPENTIN (NEURONTIN) 600 MG TABLET    Take 1 tablet (600 mg total) by mouth 3 (three) times daily.    HYDRALAZINE (APRESOLINE) 50 MG TABLET    Take 1 tablet (50 mg total) by mouth 2 (two) times daily.    LOSARTAN (COZAAR) 25 MG TABLET    Take 1 tablet (25 mg total) by mouth once daily.    NITROGLYCERIN (NITROSTAT) 0.4 MG SL TABLET    Place 1 tablet (0.4 mg total) under the tongue every 5 (five) minutes as needed for Chest pain.    ROSUVASTATIN (CRESTOR) 10 MG TABLET    Take 1 tablet (10 mg total) by mouth every evening.   Changed and/or Refilled Medications    Modified Medication Previous Medication    BACLOFEN (LIORESAL) 10 MG TABLET baclofen (LIORESAL) 10 MG tablet       Take 1 tablet (10 mg total) by mouth 3 (three) times daily as needed (muscle spasm).    Take 1 tablet (10 mg  total) by mouth 3 (three) times daily as needed (muscle spasm).    DAPAGLIFLOZIN (FARXIGA) 5 MG TAB TABLET dapagliflozin (FARXIGA) 5 mg Tab tablet       Take 1 tablet (5 mg total) by mouth once daily.    Take 1 tablet (5 mg total) by mouth once daily.    METFORMIN (GLUCOPHAGE) 500 MG TABLET metFORMIN (GLUCOPHAGE) 500 MG tablet       Take 1 tablet (500 mg total) by mouth 2 (two) times daily with meals.    Take 1 tablet (500 mg total) by mouth 2 (two) times daily with meals.       Allergies  Review of patient's allergies indicates:   Allergen Reactions    Lisinopril Swelling     Other reaction(s): Angioedema of lips       Physical Examination     Vitals:    12/07/22 0905   BP: 132/82   Pulse: 60   Resp: 20   Temp: 97.9 °F (36.6 °C)     Physical Exam  Constitutional:       Appearance: Normal appearance.   HENT:      Head: Normocephalic and atraumatic.      Right Ear: Tympanic membrane, ear canal and external ear normal.      Left Ear: Tympanic membrane, ear canal and external ear normal.      Nose: Nose normal.      Mouth/Throat:      Mouth: Mucous membranes are moist.      Pharynx: Oropharynx is clear.   Eyes:      Extraocular Movements: Extraocular movements intact.      Conjunctiva/sclera: Conjunctivae normal.      Pupils: Pupils are equal, round, and reactive to light.   Cardiovascular:      Rate and Rhythm: Normal rate and regular rhythm.      Pulses: Normal pulses.      Heart sounds: Normal heart sounds.   Pulmonary:      Effort: Pulmonary effort is normal.      Breath sounds: Normal breath sounds.   Abdominal:      General: Bowel sounds are normal.      Palpations: Abdomen is soft.   Musculoskeletal:         General: Normal range of motion.      Cervical back: Normal range of motion and neck supple.      Lumbar back: Tenderness present.   Skin:     General: Skin is warm and dry.   Neurological:      General: No focal deficit present.      Mental Status: She is alert and oriented to person, place, and time.    Psychiatric:         Mood and Affect: Mood normal.         Behavior: Behavior normal.         Thought Content: Thought content normal.         Judgment: Judgment normal.           Results     Lab Results   Component Value Date    WBC 7.7 12/05/2022    RBC 4.61 12/05/2022    HGB 11.6 (L) 12/05/2022    HCT 37.6 12/05/2022    MCV 81.6 12/05/2022    MCH 25.2 (L) 12/05/2022    MCHC 30.9 (L) 12/05/2022    RDW 16.2 12/05/2022     12/05/2022    MPV 10.6 (H) 12/05/2022     CMP  Sodium Level   Date Value Ref Range Status   12/05/2022 140 136 - 145 mmol/L Final     Potassium Level   Date Value Ref Range Status   12/05/2022 4.1 3.5 - 5.1 mmol/L Final     Carbon Dioxide   Date Value Ref Range Status   12/05/2022 21 (L) 23 - 31 mmol/L Final     Blood Urea Nitrogen   Date Value Ref Range Status   12/05/2022 13.9 9.8 - 20.1 mg/dL Final     Creatinine   Date Value Ref Range Status   12/05/2022 1.16 (H) 0.55 - 1.02 mg/dL Final     Calcium Level Total   Date Value Ref Range Status   12/05/2022 9.8 8.4 - 10.2 mg/dL Final     Albumin Level   Date Value Ref Range Status   12/05/2022 3.7 3.4 - 4.8 gm/dL Final     Bilirubin Total   Date Value Ref Range Status   12/05/2022 0.4 <=1.5 mg/dL Final     Alkaline Phosphatase   Date Value Ref Range Status   12/05/2022 118 40 - 150 unit/L Final     Aspartate Aminotransferase   Date Value Ref Range Status   12/05/2022 11 5 - 34 unit/L Final     Alanine Aminotransferase   Date Value Ref Range Status   12/05/2022 20 0 - 55 unit/L Final     Estimated GFR-Non    Date Value Ref Range Status   04/13/2022 84 >=90      Lab Results   Component Value Date    CHOL 115 04/13/2022     Lab Results   Component Value Date    HDL 45 04/13/2022     No results found for: LDLCALC  Lab Results   Component Value Date    TRIG 72 04/13/2022     No results found for: CHOLHDL  Lab Results   Component Value Date    TSH 0.7531 04/13/2022     Lab Results   Component Value Date    PHUR 6.0 04/13/2022     PROTEINUA Negative 10/13/2022    GLUCUA Normal 04/13/2022    KETONESU Negative 04/13/2022    OCCULTUA Trace 04/13/2022    NITRITE Negative 04/13/2022    LEUKOCYTESUR Negative 10/13/2022           Assessment and Plan (including Health Maintenance)     Plan:         Health Maintenance Due   Topic Date Due    Cervical Cancer Screening  Never done    COVID-19 Vaccine (3 - Booster for Moderna series) 06/04/2021    Mammogram  01/05/2023       Problem List Items Addressed This Visit          Renal/    Decreased GFR    Current Assessment & Plan     Slight decrease in eGFR and slight elevation in creatinine. Taking Farxiga with increased urination. Avoid roxie, teas, juices. Increase water intake  BMP 4-8 weeks            Endocrine    Diabetes - Primary    Overview     Current medications: Farxiga 5 mg po daily, Metformin 500 mg po BID  A1c level: 6.3% (12/2022), 7.7% 9/27/22, previously 6.4%, goal <7  CBG trends: < 150  Educated on diabetic diet: Low-fat, Low-carb, Low-cholesterol. Avoid sugary/dark drinks/sodas and white foods (i.e., bread, pasta, potatoes, rice)  Aerobic exercise: 20-30 min/day x 5 days/week  Diabetic Eye Exam: fundus 5/2022, no DR  Diabetic Foot Exam: 5/11/22, WNL  Microalbumin-U: slightly elevated  Kidney Protection: ARB  Statin Therapy: Yes, LDL 50s             Current Assessment & Plan     A1c at goal. FBG improved. Continue current medications  Increase water intake         Relevant Medications    dapagliflozin (FARXIGA) 5 mg Tab tablet    metFORMIN (GLUCOPHAGE) 500 MG tablet    Other Relevant Orders    Basic Metabolic Panel     Other Visit Diagnoses       Chronic bilateral low back pain without sciatica        Relevant Medications    baclofen (LIORESAL) 10 MG tablet    Other Relevant Orders    Ambulatory referral/consult to Orthopedics    Ambulatory referral/consult to Physical/Occupational Therapy    Encounter for screening mammogram for malignant neoplasm of breast        Relevant Orders     Mammo Digital Screening Bilat w/ Geo            Health Maintenance Topics with due status: Not Due       Topic Last Completion Date    Diabetes Urine Screening 2022    Lipid Panel 2022    Foot Exam 2022    Eye Exam 2022    Colorectal Cancer Screening 2022    High Dose Statin 10/18/2022    Hemoglobin A1c 2022       Future Appointments   Date Time Provider Department Center   2023  2:00 PM MARCOS [facility]   2023 12:15 PM MARICHUY Leonardo [facility]            Signature:  MARICHUY Leonardo  [facility]  [address] [address]    Date of encounter: 22    MARICHUY Leonardo   [facility]  [address] [address]      PATIENT NAME: Fernanda Singh  : 1960  DATE: 22  MRN: [id number]      Billing Provider: MARICHUY Leonardo  Level of Service:   Patient PCP Information       Provider PCP Type    MARICHUY Leonardo General            Reason for Visit / Chief Complaint: Follow-up, Diabetes, and Back Pain       History of Present Illness / Problem Focused Workflow     Fernanda Singh presents to the clinic with Follow-up, Diabetes, and Back Pain     Initial Visit (3/30/21): 60 y.o. AA female presenting to [facility] to establish primary care.   Previous PCP: Rachel Goff 2020   PmHx: mild CAD, hypertension, GERD, CKD, chronic tobacco use, obesity, breast mass (), uterine CA (2017), H.Pylori (), colon polyps  SHx: left breast lumpectomy (), thyroid bx (2019), tubal, total hysterectomy (), EGD , colonoscopy 2019--hyperplastic polyp   FHx: Esophageal cancer (brother), cirrhosis (brother), Renal failure, heart dz, HF, HTN, HLD   Complaints today: Establish care     Ms. Michael is a pleasant 59 yo female presenting to re-establish  primary care. Has not followed up with a PCP since 2020. States previous PCP moved. She is following [facility] clinic here for CAD and CHF (per report). Also following Heme/Onc and GYN clinics for h/o uterine ca diagnosed in 2017. She had a left breast mass removed in 2016. [facility] path was benign. She is UTD on colon cancer screening. H/o colon polyp. Recommended repeat colonoscopy 5/2024. She has a h/o diabetes diagnosed ~1-2 years ago. Was given Glipizide by last PCP before she moved. No recent A1c on file. H/o goiter with thyroid nodules. [facility] had a thyroid bx 7/2019 that was negative per report. She needs a refill on Glipizide. No other problems stated.    Health Maintenance:   Colon Ca Screening-colonoscopy 5/2019--hyperplastic polyp  Breast Ca Screening-Mammo 12/2020, benign   Bone Density 12/2020 WNL    (4/30/21): Pt presenting to review labs. Since last visit, she was seen in [facility] 4/14/21. Underwent fundus photo which was negative for DR.    Lab review:      A1c 6.8   LDL 48   TSH WNL   RBCs on UA. Noted intermittently since 2019.     Thyroid US 4/29/21 revealed a multinodular thyroid with at least one thyroid nodule on left side (1.9 cm) meeting the criteria for FNA.     Pt c/o right hand pain and numbness to first three fingers. Concerned about CTS. States remodeling home and some of the tools have been difficult to use. C/o stiffness and inability to close hand into a fist on occasion upon awakening.   Denies fever, chills, cough, chest pain, SOB, abd pain, dysuria, gross hematuria, leg pain, or any other concerns.    (10/15/21): Pt presenting for routine f/u.   Lab review:   HgA1c 5.7%      Renal indices stable   FLP WNL   Hgb 11.9-stable and improved   RBCs on UA 6-10   She underwent CT A/P in June 2/2 [facility]. No overt path on CT other than renal cyst of the lower right kidney. Urine cytology negative. [facility] GYN explained that RBCS in UA likely due to vaginal irritation/inflammation 2/2  "radiation tx. No gross hematuria.     C/o lower back pain x 1 mth. Long-standing hx of back pain but worse over past month. No overt injuries/accidents or anything "out of the norm." States pain worse at night, occasionally awakening her from her sleep. Exacerbated by standing > 10 min or strenuous activity over the course of the day. Denies falls, lower ext weakness, B/B incontinence. Relieved with rest and Naprosyn PRN--takes at night. States years since last imaging.     Bp slightly elevated. Asymptomatic. Taking medications as prescribed. F/u with [facility] today.    (5/11/22): Pt presenting for routine f/u. She had labs completed in 4/2022 but missed a f/u to review. She was referred to ENT last April for a multinodular thyroid with one meeting criteria for FNA but didn't make appt. Also states she needs to call to re-schedule appts in GYN, oncologic GYN in [facility], and [facility]. She had a negative screening mammogram (BI-RAD 2) 1/5/2022. Today, she continues with lower back pain as mentioned at last visit. She was referred to P.T. but ultimately declined appt at the time. Long-standing hx of back pain but worse over past month. No overt injuries/accidents or anything "out of the norm." States pain worse at night, occasionally awakening her from her sleep. Exacerbated by standing > 10 min or strenuous activity over the course of the day. Denies falls, lower ext weakness, B/B incontinence. Relieved with rest and Naprosyn PRN--takes at night. She's also used Flexeril in the past with some relief    (7/14/22): Pt presenting for routine f/u. Bp elevated at last visit; at goal today. Taking medications as prescribed. Previously referred to PT for chronic lower back pain ongoing at least 10 years. Now following [facility] in [place], [place]. Initial eval 5/31/22. Plans were for PT 3x/week x 6 weeks (with certification period ending 7/11/22). Pt states she has only been able to afford PT once a week. States "somewhat" " "helpful. No saddle anesthesia, B/B incontinence, or falls. Requesting refill on Baclofen. She's seen GYN and ENT since last appt. Stable and doing well. No other concerns.    10/24/22: Patient is presenting for f/u following a brief hospitalization in September and ED visit earlier this month. Patient had a recent hospital admission on September 26, 2022 due to complaints of chest pain and shortness of breath. She was admitted to r/o ACS. She underwent an inpatient Lexiscan stress test which was abnormal. She subsequently underwent a coronary angiogram on September 28, 2022 which revealed normal coronary arteries. She also completed an echocardiogram while inpatient which revealed an ejection fraction of 60%. She presented back to the emergency room on October 13, 2022 with complaints of dizziness and chest pain. She states she was given nitroglycerin via EMS in route to the emergency room which relieved her chest pain. She's been seen in [facility] on 10/18/22. Of note, patient's A1c increased to 7.7% (9/27/22) from 6.4% at last visit (at goal with Glipizide), and her glucose has been running in the 200s-300s. She's been started on Metformin 500 mg po BID, in addition to, Glipizide 10 mg po daily. She is also taking Losartan. Previously unable to tolerate ACE-I. Tolerating ARB okay. Admits glucose still "up and down since starting Metformin." States feels Glipizide needs to be changed. At this time, she's asymptomatic. No other concerns.     12/7/2022: Ms. Singh is presenting for routine f/u DM. At last visit, Glipizide was discontinued and patient was started on Farxiga 5 mg po daily, in addition to, Metformin. She is tolerating medication well. Admits urinary frequency but denies dysuria, fever, chills, abd/flank pain. Denies mycotic symptoms. Labs reviewed as below.    She is c/o recurrent lower back pain.    12/05/22 11:04  Sodium: 140  Potassium: 4.1  Chloride: 110 (H)  CO2: 21 (L)  BUN: 13.9  Creatinine: " 1.16 (H)  eGFR: 54  Glucose: 146 (H)  Calcium: 9.8  Alkaline Phosphatase: 118  PROTEIN TOTAL: 7.7 (H)  Albumin: 3.7  Albumin/Globulin Ratio: 0.9 (L)  BILIRUBIN TOTAL: 0.4  AST: 11  ALT: 20  Globulin, Total: 4.0 (H)  Hemoglobin A1C External: 6.3  Estimated Avg Glucose: 134.1      Diabetes  She presents for her follow-up diabetic visit. She has type 2 diabetes mellitus. There are no hypoglycemic associated symptoms. Pertinent negatives for hypoglycemia include no headaches. There are no diabetic associated symptoms. Pertinent negatives for diabetes include no chest pain, no weakness and no weight loss. There are no hypoglycemic complications. Diabetic complications include heart disease. Risk factors for coronary artery disease include dyslipidemia, diabetes mellitus, hypertension, obesity and post-menopausal. Current diabetic treatment includes oral agent (dual therapy). She is compliant with treatment all of the time. She is following a diabetic diet. Her home blood glucose trend is decreasing steadily. An ACE inhibitor/angiotensin II receptor blocker is being taken. She does not see a podiatrist.Eye exam is current.   Back Pain  This is a recurrent problem. The current episode started more than 1 month ago. The problem occurs intermittently. The problem has been waxing and waning since onset. The pain is present in the lumbar spine. The quality of the pain is described as aching. The pain is Worse during the night. The symptoms are aggravated by bending, standing and position. Pertinent negatives include no abdominal pain, bladder incontinence, bowel incontinence, chest pain, dysuria, fever, headaches, leg pain, numbness, paresis, paresthesias, pelvic pain, perianal numbness, tingling, weakness or weight loss. Risk factors include history of cancer, menopause, poor posture and sedentary lifestyle. She has tried muscle relaxant for the symptoms. The treatment provided mild relief.     Review of Systems     Review of  Systems   Constitutional:  Negative for fever and weight loss.   Respiratory:  Negative for shortness of breath.    Cardiovascular:  Negative for chest pain, palpitations and leg swelling.   Gastrointestinal:  Negative for abdominal pain and bowel incontinence.   Genitourinary:  Negative for bladder incontinence, dysuria and pelvic pain.   Musculoskeletal:  Positive for back pain.   Neurological:  Negative for tingling, weakness, numbness, headaches and paresthesias.   All other systems reviewed and are negative.    Medical / Social / Family History     Past Medical History:   Diagnosis Date    Cancer 2018    CHF (congestive heart failure) 2012    Diabetes mellitus 2019    Disorder of kidney and ureter 2013    GERD (gastroesophageal reflux disease) 2013    Hypertension 2011    Sleep apnea 2013       Past Surgical History:   Procedure Laterality Date    ANGIOGRAM, CORONARY, WITH LEFT HEART CATHETERIZATION N/A 9/28/2022    Procedure: Angiogram, Coronary, with Left Heart Cath;  Surgeon: Milton Connolly MD;  Location: [facility] CATH LAB;  Service: Cardiology;  Laterality: N/A;    BILATERAL TUBAL LIGATION      Biopsy Gastrointestinal  11/13/2019    BIOPSY OF THYROID  07/2019    Catheter Inserrtion Mediport      [id number]    CERVICAL CONIZATION   W/ LASER  05/01/2018    COLONOSCOPY  05/10/2019    COLONOSCOPY  09/24/2014    DILATION AND CURETTAGE OF UTERUS  05/01/2018    ESOPHAGOGASTRODUODENOSCOPY  09/24/2014    ESOPHAGOGASTRODUODENOSCOPY  11/13/2019    Left breast small mass removal      Myomectomy Hysteroscopic  05/01/2018    POLYPECTOMY  05/10/2019    POLYPECTOMY  09/24/2014    TUBAL LIGATION         Social History  Ms.  reports that she has been smoking cigarettes. She has never used smokeless tobacco. She reports that she does not currently use alcohol after a past usage of about 1.0 standard drink per week. She reports that she does not currently use drugs.    Family History  Ms.'s family history includes Cirrhosis in her  brother; Diabetes in her father; Esophageal cancer in her brother; Heart attack in her father; Heart failure in her mother; Hypertension in her brother, father, and mother; Kidney failure in her mother; Leukemia in her brother.    Medications and Allergies     Medications  Medication List with Changes/Refills   Current Medications    AMLODIPINE (NORVASC) 10 MG TABLET    Take 1 tablet (10 mg total) by mouth once daily.    ASPIRIN (ECOTRIN) 81 MG EC TABLET    Take 1 tablet (81 mg total) by mouth once daily.    BUPROPION (WELLBUTRIN XL) 150 MG TB24 TABLET    Take 1 tablet (150 mg total) by mouth 2 (two) times a day.    CARVEDILOL (COREG) 25 MG TABLET    Take 1 tablet (25 mg total) by mouth 2 (two) times a day.    FUROSEMIDE (LASIX) 40 MG TABLET    Take 1 tablet (40 mg total) by mouth once daily.    GABAPENTIN (NEURONTIN) 600 MG TABLET    Take 1 tablet (600 mg total) by mouth 3 (three) times daily.    HYDRALAZINE (APRESOLINE) 50 MG TABLET    Take 1 tablet (50 mg total) by mouth 2 (two) times daily.    LOSARTAN (COZAAR) 25 MG TABLET    Take 1 tablet (25 mg total) by mouth once daily.    NITROGLYCERIN (NITROSTAT) 0.4 MG SL TABLET    Place 1 tablet (0.4 mg total) under the tongue every 5 (five) minutes as needed for Chest pain.    ROSUVASTATIN (CRESTOR) 10 MG TABLET    Take 1 tablet (10 mg total) by mouth every evening.   Changed and/or Refilled Medications    Modified Medication Previous Medication    BACLOFEN (LIORESAL) 10 MG TABLET baclofen (LIORESAL) 10 MG tablet       Take 1 tablet (10 mg total) by mouth 3 (three) times daily as needed (muscle spasm).    Take 1 tablet (10 mg total) by mouth 3 (three) times daily as needed (muscle spasm).    DAPAGLIFLOZIN (FARXIGA) 5 MG TAB TABLET dapagliflozin (FARXIGA) 5 mg Tab tablet       Take 1 tablet (5 mg total) by mouth once daily.    Take 1 tablet (5 mg total) by mouth once daily.    METFORMIN (GLUCOPHAGE) 500 MG TABLET metFORMIN (GLUCOPHAGE) 500 MG tablet       Take 1 tablet  (500 mg total) by mouth 2 (two) times daily with meals.    Take 1 tablet (500 mg total) by mouth 2 (two) times daily with meals.       Allergies  Review of patient's allergies indicates:   Allergen Reactions    Lisinopril Swelling     Other reaction(s): Angioedema of lips       Physical Examination     Vitals:    12/07/22 0905   BP: 132/82   Pulse: 60   Resp: 20   Temp: 97.9 °F (36.6 °C)     Physical Exam  Constitutional:       Appearance: Normal appearance.   HENT:      Head: Normocephalic and atraumatic.      Mouth/Throat:      Mouth: Mucous membranes are moist.   Eyes:      Extraocular Movements: Extraocular movements intact.      Conjunctiva/sclera: Conjunctivae normal.      Pupils: Pupils are equal, round, and reactive to light.   Cardiovascular:      Rate and Rhythm: Normal rate and regular rhythm.      Pulses: Normal pulses.      Heart sounds: Normal heart sounds.   Pulmonary:      Effort: Pulmonary effort is normal.      Breath sounds: Normal breath sounds.   Musculoskeletal:         General: Normal range of motion.      Cervical back: Normal range of motion.      Right lower leg: No edema.      Left lower leg: No edema.   Skin:     General: Skin is warm and dry.   Neurological:      General: No focal deficit present.      Mental Status: She is alert and oriented to person, place, and time.   Psychiatric:         Mood and Affect: Mood normal.         Behavior: Behavior normal.         Thought Content: Thought content normal.         Judgment: Judgment normal.         Results     Lab Results   Component Value Date    WBC 7.7 12/05/2022    RBC 4.61 12/05/2022    HGB 11.6 (L) 12/05/2022    HCT 37.6 12/05/2022    MCV 81.6 12/05/2022    MCH 25.2 (L) 12/05/2022    MCHC 30.9 (L) 12/05/2022    RDW 16.2 12/05/2022     12/05/2022    MPV 10.6 (H) 12/05/2022     CMP  Sodium Level   Date Value Ref Range Status   12/05/2022 140 136 - 145 mmol/L Final     Potassium Level   Date Value Ref Range Status   12/05/2022  4.1 3.5 - 5.1 mmol/L Final     Carbon Dioxide   Date Value Ref Range Status   12/05/2022 21 (L) 23 - 31 mmol/L Final     Blood Urea Nitrogen   Date Value Ref Range Status   12/05/2022 13.9 9.8 - 20.1 mg/dL Final     Creatinine   Date Value Ref Range Status   12/05/2022 1.16 (H) 0.55 - 1.02 mg/dL Final     Calcium Level Total   Date Value Ref Range Status   12/05/2022 9.8 8.4 - 10.2 mg/dL Final     Albumin Level   Date Value Ref Range Status   12/05/2022 3.7 3.4 - 4.8 gm/dL Final     Bilirubin Total   Date Value Ref Range Status   12/05/2022 0.4 <=1.5 mg/dL Final     Alkaline Phosphatase   Date Value Ref Range Status   12/05/2022 118 40 - 150 unit/L Final     Aspartate Aminotransferase   Date Value Ref Range Status   12/05/2022 11 5 - 34 unit/L Final     Alanine Aminotransferase   Date Value Ref Range Status   12/05/2022 20 0 - 55 unit/L Final     Estimated GFR-Non    Date Value Ref Range Status   04/13/2022 84 >=90      Lab Results   Component Value Date    CHOL 115 04/13/2022     Lab Results   Component Value Date    HDL 45 04/13/2022     No results found for: LDLCALC  Lab Results   Component Value Date    TRIG 72 04/13/2022     No results found for: CHOLHDL  Lab Results   Component Value Date    TSH 0.7531 04/13/2022     Lab Results   Component Value Date    PHUR 6.0 04/13/2022    PROTEINUA Negative 10/13/2022    GLUCUA Normal 04/13/2022    KETONESU Negative 04/13/2022    OCCULTUA Trace 04/13/2022    NITRITE Negative 04/13/2022    LEUKOCYTESUR Negative 10/13/2022           Assessment and Plan (including Health Maintenance)     Plan:         Health Maintenance Due   Topic Date Due    Cervical Cancer Screening  Never done    COVID-19 Vaccine (3 - Booster for Moderna series) 06/04/2021    Mammogram  01/05/2023       Problem List Items Addressed This Visit          Renal/    Decreased GFR    Current Assessment & Plan     Slight decrease in eGFR and slight elevation in creatinine. Taking Farxiga with  increased urination. Avoid orxie, teas, juices. Increase water intake  BMP 4-8 weeks            Endocrine    Diabetes - Primary    Overview     Current medications: Farxiga 5 mg po daily, Metformin 500 mg po BID  A1c level: 6.3% (12/2022), 7.7% 9/27/22, previously 6.4%, goal <7  CBG trends: < 150  Educated on diabetic diet: Low-fat, Low-carb, Low-cholesterol. Avoid sugary/dark drinks/sodas and white foods (i.e., bread, pasta, potatoes, rice)  Aerobic exercise: 20-30 min/day x 5 days/week  Diabetic Eye Exam: fundus 5/2022, no DR  Diabetic Foot Exam: 5/11/22, WNL  Microalbumin-U: slightly elevated  Kidney Protection: ARB  Statin Therapy: Yes, LDL 50s             Current Assessment & Plan     A1c at goal. FBG improved. Continue current medications  Increase water intake         Relevant Medications    dapagliflozin (FARXIGA) 5 mg Tab tablet    metFORMIN (GLUCOPHAGE) 500 MG tablet    Other Relevant Orders    Basic Metabolic Panel     Other Visit Diagnoses       Chronic bilateral low back pain without sciatica        Relevant Medications    baclofen (LIORESAL) 10 MG tablet    Other Relevant Orders    Ambulatory referral/consult to Orthopedics    Ambulatory referral/consult to Physical/Occupational Therapy    Encounter for screening mammogram for malignant neoplasm of breast        Relevant Orders    Mammo Digital Screening Bilat w/ Geo            Health Maintenance Topics with due status: Not Due       Topic Last Completion Date    Diabetes Urine Screening 04/13/2022    Lipid Panel 04/13/2022    Foot Exam 05/11/2022    Eye Exam 05/11/2022    Colorectal Cancer Screening 05/21/2022    High Dose Statin 10/18/2022    Hemoglobin A1c 12/05/2022       Future Appointments   Date Time Provider Department Center   1/17/2023  2:00 PM MARCOS Carroll [facility] CARD [place]   2/1/2023 12:15 PM MARICHUY Leonardo [facility] [place]     6-8 week f/u to f/u back pain    I spent a total of 35 minutes on the day of the  visit.  This includes face to face time and non-face to face time preparing to see the patient (eg, review of tests), obtaining and/or reviewing separately obtained history, documenting clinical information in the electronic or other health record, independently interpreting results and communicating results to the patient/family/caregiver, or care coordinator.    Signature:  MARICHUY Leonardo  [facility]  [address] [facility] [id number]    Date of encounter: 12/7/22

## 2022-12-09 LAB
OHS CV EVENT MONITOR DAY: 0
OHS CV HOLTER LENGTH DECIMAL HOURS: 48
OHS CV HOLTER LENGTH HOURS: 48
OHS CV HOLTER LENGTH MINUTES: 0
OHS CV HOLTER SINUS AVERAGE HR: 72
OHS CV HOLTER SINUS MAX HR: 87
OHS CV HOLTER SINUS MIN HR: 67

## 2023-01-03 ENCOUNTER — PATIENT MESSAGE (OUTPATIENT)
Dept: CARDIOLOGY | Facility: CLINIC | Age: 63
End: 2023-01-03
Payer: MEDICARE

## 2023-01-03 DIAGNOSIS — I10 PRIMARY HYPERTENSION: Primary | ICD-10-CM

## 2023-01-03 RX ORDER — LOSARTAN POTASSIUM 25 MG/1
25 TABLET ORAL DAILY
Qty: 90 TABLET | Refills: 2 | Status: SHIPPED | OUTPATIENT
Start: 2023-01-03 | End: 2023-04-26 | Stop reason: SDUPTHER

## 2023-01-10 ENCOUNTER — HOSPITAL ENCOUNTER (OUTPATIENT)
Dept: RADIOLOGY | Facility: HOSPITAL | Age: 63
Discharge: HOME | End: 2023-01-10
Attending: NURSE PRACTITIONER
Payer: MEDICARE

## 2023-01-10 DIAGNOSIS — Z12.31 ENCOUNTER FOR SCREENING MAMMOGRAM FOR MALIGNANT NEOPLASM OF BREAST: ICD-10-CM

## 2023-01-10 PROCEDURE — 77067 SCR MAMMO BI INCL CAD: CPT | Mod: TC

## 2023-01-10 PROCEDURE — 77063 BREAST TOMOSYNTHESIS BI: CPT | Mod: 26,,, | Performed by: RADIOLOGY

## 2023-01-10 PROCEDURE — 77067 SCR MAMMO BI INCL CAD: CPT | Mod: 26,,, | Performed by: RADIOLOGY

## 2023-01-10 PROCEDURE — 77067 MAMMO DIGITAL SCREENING BILAT WITH TOMO: ICD-10-PCS | Mod: 26,,, | Performed by: RADIOLOGY

## 2023-01-10 PROCEDURE — 77063 MAMMO DIGITAL SCREENING BILAT WITH TOMO: ICD-10-PCS | Mod: 26,,, | Performed by: RADIOLOGY

## 2023-01-23 PROBLEM — Z00.00 WELLNESS EXAMINATION: Status: RESOLVED | Noted: 2022-07-13 | Resolved: 2023-01-23

## 2023-02-01 ENCOUNTER — TELEPHONE (OUTPATIENT)
Dept: INTERNAL MEDICINE | Facility: CLINIC | Age: 63
End: 2023-02-01

## 2023-02-22 ENCOUNTER — OFFICE VISIT (OUTPATIENT)
Dept: CARDIOLOGY | Facility: CLINIC | Age: 63
End: 2023-02-22
Payer: MEDICARE

## 2023-02-22 VITALS
HEIGHT: 68 IN | OXYGEN SATURATION: 99 % | HEART RATE: 70 BPM | TEMPERATURE: 99 F | BODY MASS INDEX: 42.7 KG/M2 | WEIGHT: 281.75 LBS | RESPIRATION RATE: 20 BRPM | DIASTOLIC BLOOD PRESSURE: 74 MMHG | SYSTOLIC BLOOD PRESSURE: 121 MMHG

## 2023-02-22 DIAGNOSIS — I10 PRIMARY HYPERTENSION: ICD-10-CM

## 2023-02-22 DIAGNOSIS — E78.5 HYPERLIPIDEMIA LDL GOAL <70: ICD-10-CM

## 2023-02-22 DIAGNOSIS — R00.2 PALPITATIONS: ICD-10-CM

## 2023-02-22 DIAGNOSIS — I25.10 MILD CAD: ICD-10-CM

## 2023-02-22 DIAGNOSIS — Z72.0 TOBACCO USER: Primary | ICD-10-CM

## 2023-02-22 DIAGNOSIS — I50.32 CHRONIC HEART FAILURE WITH PRESERVED EJECTION FRACTION: ICD-10-CM

## 2023-02-22 DIAGNOSIS — E11.9 TYPE 2 DIABETES MELLITUS WITHOUT COMPLICATION, WITHOUT LONG-TERM CURRENT USE OF INSULIN: ICD-10-CM

## 2023-02-22 PROCEDURE — 99214 OFFICE O/P EST MOD 30 MIN: CPT | Mod: PBBFAC | Performed by: STUDENT IN AN ORGANIZED HEALTH CARE EDUCATION/TRAINING PROGRAM

## 2023-02-22 NOTE — PROGRESS NOTES
[facility]   Cardiology Clinic - Follow Up     Date of Visit: 2/22/2023  Reason for Visit/Chief Complaint:   Chief Complaint    F/U 3 month visit          I have explained to Ms. Fernanda Singh that I am not a cardiologist. She understands that I am an internal medicine physician seeing patients in the cardiology clinic. Ms. Fernanda Singh has expressed understanding of this fact and is willing to proceed with this visit.     The patient was discussed with the cardiologist at the time of the appointment.     History of Present Illness:      Fernanda Singh is a 62 y.o. female with a PMH significant for mild CAD, hypertension, GERD, CKD, chronic tobacco use, obesity, breast mass (2016), uterine CA (5/2017), and hysterectomy who presents to cardiology clinic for follow up. She presents today without significant complaints. She reports occasional dizziness when her blood sugar is low but is otherwise doing well. She is currently smoking a pack a day which is increased from her last visit.     CP:  The patient has no chest discomfort.      SOB:  The patient denies shortness of breath No FERNANDES    EDEMA:  The patient denies edema.        ORTHOPNEA:  The patient denies orthopnea.  No PND.      SYNCOPE:  The patient denies near syncope.  No syncope.       PALPITATIONS:  The patient has no palpitations.    LEVEL OF EXERTION:  The patient does house work and does not have symptoms with this level of exertion.  The patient's level of exertion is minimal.    Past Medical History:        Past Medical History:   Diagnosis Date    Cancer 2018    CHF (congestive heart failure) 2012    Diabetes mellitus 2019    Disorder of kidney and ureter 2013    GERD (gastroesophageal reflux disease) 2013    Hypertension 2011    Sleep apnea 2013       Surgical History:        Past Surgical History:   Procedure Laterality Date    ANGIOGRAM, CORONARY, WITH LEFT HEART CATHETERIZATION N/A 9/28/2022     Procedure: Angiogram, Coronary, with Left Heart Cath;  Surgeon: Milton Connolly MD;  Location: [facility] CATH LAB;  Service: Cardiology;  Laterality: N/A;    BILATERAL TUBAL LIGATION      Biopsy Gastrointestinal  11/13/2019    BIOPSY OF THYROID  07/2019    [facility]      [id number]    CERVICAL CONIZATION   W/ LASER  05/01/2018    COLONOSCOPY  05/10/2019    COLONOSCOPY  09/24/2014    DILATION AND CURETTAGE OF UTERUS  05/01/2018    ESOPHAGOGASTRODUODENOSCOPY  09/24/2014    ESOPHAGOGASTRODUODENOSCOPY  11/13/2019    Left breast small mass removal      Myomectomy Hysteroscopic  05/01/2018    POLYPECTOMY  05/10/2019    POLYPECTOMY  09/24/2014    TUBAL LIGATION         Family History:        Family History   Problem Relation Age of Onset    Hypertension Mother     Heart failure Mother     Kidney failure Mother     Heart attack Father     Hypertension Father     Diabetes Father     Leukemia Brother     Cirrhosis Brother     Hypertension Brother     Esophageal cancer Brother        Social History:        Social History     Tobacco Use    Smoking status: Every Day     Years: 15.00     Types: Cigarettes    Smokeless tobacco: Never    Tobacco comments:     Currently on Wellbutrin 3-5 cigarettes daily   Substance Use Topics    Alcohol use: Not Currently     Alcohol/week: 1.0 standard drink     Types: 1 Glasses of wine per week     Comment: monthly    Drug use: Not Currently       Allergies:         Review of patient's allergies indicates:   Allergen Reactions    Lisinopril Swelling     Other reaction(s): Angioedema of lips       Medications:        Current Outpatient Medications   Medication Sig Dispense Refill    amLODIPine (NORVASC) 10 MG tablet Take 1 tablet (10 mg total) by mouth once daily. 90 tablet 3    aspirin (ECOTRIN) 81 MG EC tablet Take 1 tablet (81 mg total) by mouth once daily. 90 tablet 3    baclofen (LIORESAL) 10 MG tablet Take 1 tablet (10 mg total) by mouth 3 (three) times daily as needed (muscle  spasm). 30 tablet 1    carvediloL (COREG) 25 MG tablet Take 1 tablet (25 mg total) by mouth 2 (two) times a day. 180 tablet 3    dapagliflozin (FARXIGA) 5 mg Tab tablet Take 1 tablet (5 mg total) by mouth once daily. 90 tablet 1    furosemide (LASIX) 40 MG tablet Take 1 tablet (40 mg total) by mouth once daily. 90 tablet 3    gabapentin (NEURONTIN) 600 MG tablet Take 1 tablet (600 mg total) by mouth 3 (three) times daily. 270 tablet 1    hydrALAZINE (APRESOLINE) 50 MG tablet Take 1 tablet (50 mg total) by mouth 2 (two) times daily. 180 tablet 3    losartan (COZAAR) 25 MG tablet Take 1 tablet (25 mg total) by mouth once daily. 90 tablet 2    metFORMIN (GLUCOPHAGE) 500 MG tablet Take 1 tablet (500 mg total) by mouth 2 (two) times daily with meals. 180 tablet 1    nitroGLYCERIN (NITROSTAT) 0.4 MG SL tablet Place 1 tablet (0.4 mg total) under the tongue every 5 (five) minutes as needed for Chest pain. 25 tablet 3    rosuvastatin (CRESTOR) 10 MG tablet Take 1 tablet (10 mg total) by mouth every evening. 90 tablet 3    buPROPion (WELLBUTRIN XL) 150 MG TB24 tablet Take 1 tablet (150 mg total) by mouth 2 (two) times a day. (Patient not taking: Reported on 2/22/2023) 60 tablet 1     No current facility-administered medications for this visit.       I have reviewed and updated the patient's medications, allergies, past medical history, surgical history, social history and family history as needed.    Review of Systems:      Review of Systems   Constitutional:  Negative for chills, diaphoresis and fever.   HENT:  Negative for hearing loss and nosebleeds.    Eyes:  Negative for blurred vision.   Respiratory:  Negative for shortness of breath.    Cardiovascular:  Negative for chest pain, palpitations, orthopnea, claudication and PND.   Gastrointestinal:  Negative for nausea and vomiting.   Genitourinary:  Negative for dysuria.   Musculoskeletal:  Negative for myalgias.   Skin:  Negative for rash.   Neurological:  Positive for  "dizziness. Negative for headaches.     Objective:        Vitals:    02/22/23 1502   BP: 121/74   Pulse: 70   Resp: 20   Temp: 98.5 °F (36.9 °C)     Wt Readings from Last 3 Encounters:   02/22/23 127.8 kg (281 lb 12 oz)   12/07/22 132 kg (291 lb)   10/24/22 132.1 kg (291 lb 3.2 oz)     Temp Readings from Last 3 Encounters:   02/22/23 98.5 °F (36.9 °C) (Oral)   12/07/22 97.9 °F (36.6 °C) (Oral)   10/24/22 98.1 °F (36.7 °C) (Oral)     BP Readings from Last 3 Encounters:   02/22/23 121/74   12/07/22 132/82   10/24/22 134/84     Pulse Readings from Last 3 Encounters:   02/22/23 70   12/07/22 60   10/24/22 65       Vitals:    02/22/23 1502   BP: 121/74   BP Location: Right arm   Patient Position: Sitting   BP Method: Large (Automatic)   Pulse: 70   Resp: 20   Temp: 98.5 °F (36.9 °C)   TempSrc: Oral   SpO2: 99%   Weight: 127.8 kg (281 lb 12 oz)   Height: 5' 8" (1.727 m)     Body mass index is 42.84 kg/m².    Physical Exam  Constitutional:       Appearance: She is well-developed.   HENT:      Head: Normocephalic and atraumatic.   Eyes:      Conjunctiva/sclera: Conjunctivae normal.   Neck:      Vascular: No carotid bruit or JVD.   Cardiovascular:      Rate and Rhythm: Normal rate and regular rhythm.      Chest Wall: PMI is not displaced.      Pulses: Normal pulses and intact distal pulses.      Heart sounds: No midsystolic click. No murmur heard.    No friction rub. No gallop.   Pulmonary:      Effort: Pulmonary effort is normal.      Breath sounds: Normal breath sounds.   Abdominal:      General: Abdomen is flat. Bowel sounds are normal.   Musculoskeletal:      Right lower leg: No edema.      Left lower leg: No edema.   Skin:     General: Skin is warm and dry.   Neurological:      Mental Status: She is alert. Mental status is at baseline.   Psychiatric:         Mood and Affect: Mood normal.         Behavior: Behavior normal. Behavior is cooperative.         Judgment: Judgment normal.        Labs:      I have reviewed the " following labs below:      CBC:  Lab Results   Component Value Date    WBC 7.7 12/05/2022    HGB 11.6 (L) 12/05/2022    HCT 37.6 12/05/2022     12/05/2022    MCV 81.6 12/05/2022    RDW 16.2 12/05/2022     BMP:  Lab Results   Component Value Date     12/05/2022    K 4.1 12/05/2022    CO2 21 (L) 12/05/2022    BUN 13.9 12/05/2022    CALCIUM 9.8 12/05/2022    MG 1.60 09/29/2022    PHOS 4.7 09/29/2022     LFTs:  Lab Results   Component Value Date    ALBUMIN 3.7 12/05/2022    BILITOT 0.4 12/05/2022    AST 11 12/05/2022    ALKPHOS 118 12/05/2022    ALT 20 12/05/2022     FLP:  Cholesterol Total   Date Value Ref Range Status   04/13/2022 115 <=200      HDL Cholesterol   Date Value Ref Range Status   04/13/2022 45 35 - 60      LDL Cholesterol   Date Value Ref Range Status   04/13/2022 56.00 50.00 - 140.00      Triglyceride   Date Value Ref Range Status   04/13/2022 72 37 - 140      DM:  Lab Results   Component Value Date    HGBA1C 6.3 12/05/2022    HGBA1C 7.7 (H) 09/27/2022    HGBA1C 6.4 04/13/2022    CREATININE 1.16 (H) 12/05/2022     Thyroid:  Lab Results   Component Value Date    TSH 0.7531 04/13/2022     Anemia:No results found for: IRON, TIBC, FERRITIN, [id number], FOLATE  Coags:  Lab Results   Component Value Date    INR 0.94 09/26/2022    PROTIME 12.4 09/26/2022      Cardiac:  Lab Results   Component Value Date    TROPONINI <0.010 10/13/2022    TROPONINI <0.010 09/26/2022    TROPONINI <0.010 09/26/2022    TROPONINI <0.010 09/26/2022    TROPONINI <0.015 12/11/2019    BNP <10.0 10/13/2022    BNP 20.7 09/26/2022       Cardiac Studies/Imaging:        I have reviewed the following studies below:        48 Hour Holter  Underlying rhythm:   Sinus  Arrythmia:  No significant arrhythmia noted     Pauses:  No significant pause noted  Symptoms:  No diary returned    Events:  No patient marked events         Echocardiogram  Results for orders placed during the hospital encounter of 09/26/22  Echo  Interpretation  Summary  · The left ventricle is normal in size with concentric hypertrophy and normal systolic function. The estimated ejection fraction is 60%.  · Normal right ventricular size with normal right ventricular systolic function.      Stress Test  Results for orders placed during the hospital encounter of 09/26/22  Nuclear Stress - Cardiology Interpreted  Interpretation Summary    Abnormal myocardial perfusion scan.    There is a moderate intensity, small sized, reversible perfusion abnormality that is consistent with ischemia in the distal anteroseptal and apical wall(s) in the typical distribution of the LAD territory.    There are no other significant perfusion abnormalities.    The gated perfusion images showed an ejection fraction of 59% at rest. The gated perfusion images showed an ejection fraction of 50% post stress.    There is normal wall motion at rest and post stress.    LV cavity size is normal at rest and normal at stress.    The EKG portion of this study is positive for ischemia.    The patient reported no chest pain during the stress test.       Coronary Angiogram  Results for orders placed during the hospital encounter of 09/26/22  Cardiac catheterization  Conclusion    The pre-procedure left ventricular end diastolic pressure was 5.    The estimated blood loss was none.    The coronary arteries were normal..  FINDINGS  LVEDP:  5 mmHg  The patient has No CAD  Blood loss:  less than 15 cc.  RECOMMENDATIONS  Medical management  Weight loss.  Blood pressure control.  Statin therapy.  Activity -- avoid straining with affected limbs for one week  Exercise -- as tolerated  Precautions post D/C -- come to ER for hematoma, unusual pain, erythema, or unusual drainage at access site  Precautions post D/C -- if develop bleeding or hematoma at access site, hold pressure at access site, and come to ER  POST-CATH GUIDELINE FOR INPATIENT DISCHARGE  No hematoma or swelling at access site  No unusual tenderness at  access site  Adequate distal pulse or capillary refill in limb(s) accessed  No unusual difficulty with ambulation after bed rest is over      48-hour Holter monitor February 2021:  The patient was in sinus rhythm with an average heart rate of 77 bpm. Heart rates greater than 120 bpm were noted less than 1% of the time. No episodes of bradycardia was noted. Fine less than 1% of beats were due to PVCs. Short burst of V run lasting for 4 beats at 200 bpm.  Occasional PACs. Episode of SVT lasting for 8 beats at 148 bpm.  No diary was returned.    Echo 2/20  Mild concentric left ventricular hypertrophy.Left ventricular ejection fraction is measured at approximately 50 to 55 %.    Holter (48hr)-11/2016 : Patient was in normal sinus rhythm throughout. Supraventricular ectopic beats consisted of very rare premature beats. Ventricular ectopic beats consisted of very rare unifocal beats.    Left heart cath on September 19, 2016:  Mild CAD  Hypertensive disease  Normal LVEF 60%  Recommendations: Medical management and aggressive risk factor management    ECHO (2015)  EF >55%      Assessment & Plan:      62 y.o. female with the following medical problems:    Mild CAD per LHC in 2016      - S/p LHC on 9.28.22 which revealed normal coronary arteries       - Continue aspirin, coreg, and crestor      - Counseled on heart healthy diet, exercise, and smoking cessation    HFpEF      - EF 60% per Echo 9.26.22      - Continue Lasix      - counseled on BP control and diet and exercise     HTN (hypertension)       - BP at goal - 121/74      - Continue Amlodipine, Coreg, hydralazine, Losartan, and Lasix      - Counseled on low salt diet and exercise    Palpitations      - 48 hour Holter monitor February 2021 - revealed she was in sinus rhythm with an average heart rate of 77 bpm. See report under HPI.       - Continues to endorse palpitations -worse since previous visit       - Repeat Holter December 2022 was WNL     HLD      - LDL at goal  - 56      - Continue Crestor 10mg daily       - Counseled on diet and exercise    DM      - A1C at goal - 6.3      - Management per PCP    Tobacco use      - Counseled on smoking cessation      - She currently smokes about 1 pack per day - states she is trying to quit     Return to clinic in 6 months.      No future appointments.      Zelda Rodriguez DO  Internal Medicine Physician   Department of Medicine   [facility]    02/22/2023 3:14 PM

## 2023-04-10 ENCOUNTER — PATIENT MESSAGE (OUTPATIENT)
Dept: INTERNAL MEDICINE | Facility: CLINIC | Age: 63
End: 2023-04-10
Payer: MEDICARE

## 2023-04-13 ENCOUNTER — LAB VISIT (OUTPATIENT)
Dept: LAB | Facility: HOSPITAL | Age: 63
End: 2023-04-13
Attending: NURSE PRACTITIONER
Payer: MEDICARE

## 2023-04-13 DIAGNOSIS — E11.9 TYPE 2 DIABETES MELLITUS WITHOUT COMPLICATION, WITHOUT LONG-TERM CURRENT USE OF INSULIN: ICD-10-CM

## 2023-04-13 LAB
ANION GAP SERPL CALC-SCNC: 7 MEQ/L
BUN SERPL-MCNC: 12 MG/DL (ref 9.8–20.1)
CALCIUM SERPL-MCNC: 9.8 MG/DL (ref 8.4–10.2)
CHLORIDE SERPL-SCNC: 108 MMOL/L (ref 98–107)
CO2 SERPL-SCNC: 26 MMOL/L (ref 23–31)
CREAT SERPL-MCNC: 0.93 MG/DL (ref 0.55–1.02)
CREAT/UREA NIT SERPL: 13
GFR SERPLBLD CREATININE-BSD FMLA CKD-EPI: >60 MLS/MIN/1.73/M2
GLUCOSE SERPL-MCNC: 126 MG/DL (ref 82–115)
POTASSIUM SERPL-SCNC: 4.1 MMOL/L (ref 3.5–5.1)
SODIUM SERPL-SCNC: 141 MMOL/L (ref 136–145)

## 2023-04-13 PROCEDURE — 80048 BASIC METABOLIC PNL TOTAL CA: CPT

## 2023-04-13 PROCEDURE — 36415 COLL VENOUS BLD VENIPUNCTURE: CPT

## 2023-04-26 ENCOUNTER — OFFICE VISIT (OUTPATIENT)
Dept: INTERNAL MEDICINE | Facility: CLINIC | Age: 63
End: 2023-04-26
Payer: MEDICARE

## 2023-04-26 VITALS
HEIGHT: 68 IN | TEMPERATURE: 98 F | SYSTOLIC BLOOD PRESSURE: 132 MMHG | HEART RATE: 65 BPM | RESPIRATION RATE: 20 BRPM | BODY MASS INDEX: 43.25 KG/M2 | WEIGHT: 285.38 LBS | DIASTOLIC BLOOD PRESSURE: 84 MMHG

## 2023-04-26 DIAGNOSIS — E89.40 ASYMPTOMATIC POSTSURGICAL MENOPAUSE: ICD-10-CM

## 2023-04-26 DIAGNOSIS — E11.9 TYPE 2 DIABETES MELLITUS WITHOUT COMPLICATION, WITHOUT LONG-TERM CURRENT USE OF INSULIN: Primary | ICD-10-CM

## 2023-04-26 DIAGNOSIS — Z78.0 ASYMPTOMATIC POSTMENOPAUSAL STATE: ICD-10-CM

## 2023-04-26 DIAGNOSIS — Z13.5 SCREENING FOR DIABETIC RETINOPATHY: ICD-10-CM

## 2023-04-26 DIAGNOSIS — L84 FOOT CALLUS: ICD-10-CM

## 2023-04-26 DIAGNOSIS — I10 PRIMARY HYPERTENSION: ICD-10-CM

## 2023-04-26 DIAGNOSIS — M51.36 DEGENERATIVE DISC DISEASE, LUMBAR: ICD-10-CM

## 2023-04-26 DIAGNOSIS — Z00.00 WELLNESS EXAMINATION: ICD-10-CM

## 2023-04-26 LAB
LEFT EYE DM RETINOPATHY: NEGATIVE
RIGHT EYE DM RETINOPATHY: NEGATIVE

## 2023-04-26 PROCEDURE — 99214 OFFICE O/P EST MOD 30 MIN: CPT | Mod: S$PBB,,, | Performed by: NURSE PRACTITIONER

## 2023-04-26 PROCEDURE — 3008F BODY MASS INDEX DOCD: CPT | Mod: CPTII,,, | Performed by: NURSE PRACTITIONER

## 2023-04-26 PROCEDURE — 4010F ACE/ARB THERAPY RXD/TAKEN: CPT | Mod: CPTII,,, | Performed by: NURSE PRACTITIONER

## 2023-04-26 PROCEDURE — 1159F MED LIST DOCD IN RCRD: CPT | Mod: CPTII,,, | Performed by: NURSE PRACTITIONER

## 2023-04-26 PROCEDURE — 3075F PR MOST RECENT SYSTOLIC BLOOD PRESS GE 130-139MM HG: ICD-10-PCS | Mod: CPTII,,, | Performed by: NURSE PRACTITIONER

## 2023-04-26 PROCEDURE — 3079F PR MOST RECENT DIASTOLIC BLOOD PRESSURE 80-89 MM HG: ICD-10-PCS | Mod: CPTII,,, | Performed by: NURSE PRACTITIONER

## 2023-04-26 PROCEDURE — 3075F SYST BP GE 130 - 139MM HG: CPT | Mod: CPTII,,, | Performed by: NURSE PRACTITIONER

## 2023-04-26 PROCEDURE — 1159F PR MEDICATION LIST DOCUMENTED IN MEDICAL RECORD: ICD-10-PCS | Mod: CPTII,,, | Performed by: NURSE PRACTITIONER

## 2023-04-26 PROCEDURE — 1160F PR REVIEW ALL MEDS BY PRESCRIBER/CLIN PHARMACIST DOCUMENTED: ICD-10-PCS | Mod: CPTII,,, | Performed by: NURSE PRACTITIONER

## 2023-04-26 PROCEDURE — 99215 OFFICE O/P EST HI 40 MIN: CPT | Mod: PBBFAC | Performed by: NURSE PRACTITIONER

## 2023-04-26 PROCEDURE — 99214 PR OFFICE/OUTPT VISIT, EST, LEVL IV, 30-39 MIN: ICD-10-PCS | Mod: S$PBB,,, | Performed by: NURSE PRACTITIONER

## 2023-04-26 PROCEDURE — 4010F PR ACE/ARB THEARPY RXD/TAKEN: ICD-10-PCS | Mod: CPTII,,, | Performed by: NURSE PRACTITIONER

## 2023-04-26 PROCEDURE — 3008F PR BODY MASS INDEX (BMI) DOCUMENTED: ICD-10-PCS | Mod: CPTII,,, | Performed by: NURSE PRACTITIONER

## 2023-04-26 PROCEDURE — 1160F RVW MEDS BY RX/DR IN RCRD: CPT | Mod: CPTII,,, | Performed by: NURSE PRACTITIONER

## 2023-04-26 PROCEDURE — 3079F DIAST BP 80-89 MM HG: CPT | Mod: CPTII,,, | Performed by: NURSE PRACTITIONER

## 2023-04-26 RX ORDER — LOSARTAN POTASSIUM 25 MG/1
25 TABLET ORAL DAILY
Qty: 90 TABLET | Refills: 1
Start: 2023-04-26 | End: 2023-08-23 | Stop reason: SDUPTHER

## 2023-04-26 RX ORDER — METFORMIN HYDROCHLORIDE 500 MG/1
500 TABLET ORAL 2 TIMES DAILY WITH MEALS
Qty: 180 TABLET | Refills: 1
Start: 2023-04-26 | End: 2023-07-14

## 2023-04-26 RX ORDER — DAPAGLIFLOZIN 5 MG/1
5 TABLET, FILM COATED ORAL DAILY
Qty: 90 TABLET | Refills: 1 | Status: SHIPPED | OUTPATIENT
Start: 2023-04-26 | End: 2023-12-08 | Stop reason: SDUPTHER

## 2023-04-26 NOTE — PROGRESS NOTES
MARICHUY Leonardo   [facility]  [address] [facility] [address]      PATIENT NAME: Fernanda Singh  : 1960  DATE: 23  MRN: [id number]        Reason for Visit / Chief Complaint: Back Pain and Diabetes       History of Present Illness / Problem Focused Workflow     Fernanda Singh presents to the clinic with Back Pain and Diabetes     Initial Visit (3/30/21): 60 y.o. AA female presenting to [facility] to establish primary care.   Previous PCP: Rachel Goff 2020   PmHx: mild CAD, hypertension, GERD, CKD, chronic tobacco use, obesity, breast mass (), uterine CA (2017), H.Pylori (), colon polyps  SHx: left breast lumpectomy (), thyroid bx (2019), tubal, total hysterectomy (), EGD , colonoscopy 2019--hyperplastic polyp   FHx: Esophageal cancer (brother), cirrhosis (brother), Renal failure, heart dz, HF, HTN, HLD   Complaints today: Establish care     Ms. Michael is a pleasant 59 yo female presenting to re-establish primary care. Has not followed up with a PCP since . States previous PCP moved. She is following [facility] clinic here for CAD and CHF (per report). Also following Heme/Onc and GYN clinics for h/o uterine ca diagnosed in . She had a left breast mass removed in . [facility] path was benign. She is UTD on colon cancer screening. H/o colon polyp. Recommended repeat colonoscopy 2024. She has a h/o diabetes diagnosed ~1-2 years ago. Was given Glipizide by last PCP before she moved. No recent A1c on file. H/o goiter with thyroid nodules. [facility] had a thyroid bx 2019 that was negative per report. She needs a refill on Glipizide. No other problems stated.    Health Maintenance:   Colon Ca Screening-colonoscopy 2019--hyperplastic polyp  Breast Ca Screening-Mammo 2020, benign   Bone Density 2020 WNL    (21): Pt presenting to review labs. Since last visit, she was seen in [facility]  "4/14/21. Underwent fundus photo which was negative for DR.    Lab review:      A1c 6.8   LDL 48   TSH WNL   RBCs on UA. Noted intermittently since 2019.     Thyroid US 4/29/21 revealed a multinodular thyroid with at least one thyroid nodule on left side (1.9 cm) meeting the criteria for FNA.     Pt c/o right hand pain and numbness to first three fingers. Concerned about CTS. States remodeling home and some of the tools have been difficult to use. C/o stiffness and inability to close hand into a fist on occasion upon awakening.   Denies fever, chills, cough, chest pain, SOB, abd pain, dysuria, gross hematuria, leg pain, or any other concerns.    (10/15/21): Pt presenting for routine f/u.   Lab review:   HgA1c 5.7%      Renal indices stable   FLP WNL   Hgb 11.9-stable and improved   RBCs on UA 6-10   She underwent CT A/P in June 2/2 [facility]. No overt path on CT other than renal cyst of the lower right kidney. Urine cytology negative. States GYN explained that RBCS in UA likely due to vaginal irritation/inflammation 2/2 radiation tx. No gross hematuria.     C/o lower back pain x 1 mth. Long-standing hx of back pain but worse over past month. No overt injuries/accidents or anything "out of the norm." States pain worse at night, occasionally awakening her from her sleep. Exacerbated by standing > 10 min or strenuous activity over the course of the day. Denies falls, lower ext weakness, B/B incontinence. Relieved with rest and Naprosyn PRN--takes at night. States years since last imaging.     Bp slightly elevated. Asymptomatic. Taking medications as prescribed. F/u with [facility] today.    (5/11/22): Pt presenting for routine f/u. She had labs completed in 4/2022 but missed a f/u to review. She was referred to ENT last April for a multinodular thyroid with one meeting criteria for FNA but didn't make appt. Also states she needs to call to re-schedule appts in GYN, oncologic GYN in [place], and [facility] clinic. She had a " "negative screening mammogram (BI-RAD 2) 1/5/2022. Today, she continues with lower back pain as mentioned at last visit. She was referred to P.T. but ultimately declined appt at the time. Long-standing hx of back pain but worse over past month. No overt injuries/accidents or anything "out of the norm." States pain worse at night, occasionally awakening her from her sleep. Exacerbated by standing > 10 min or strenuous activity over the course of the day. Denies falls, lower ext weakness, B/B incontinence. Relieved with rest and Naprosyn PRN--takes at night. She's also used Flexeril in the past with some relief    (7/14/22): Pt presenting for routine f/u. Bp elevated at last visit; at goal today. Taking medications as prescribed. Previously referred to PT for chronic lower back pain ongoing at least 10 years. Now following [facility] in [place], [place]. Initial eval 5/31/22. Plans were for PT 3x/week x 6 weeks (with certification period ending 7/11/22). Pt states she has only been able to afford PT once a week. States "somewhat" helpful. No saddle anesthesia, B/B incontinence, or falls. Requesting refill on Baclofen. She's seen GYN and ENT since last appt. Stable and doing well. No other concerns.    10/24/22: Patient is presenting for f/u following a brief hospitalization in September and ED visit earlier this month. Patient had a recent hospital admission on September 26, 2022 due to complaints of chest pain and shortness of breath. She was admitted to r/o ACS. She underwent an inpatient Lexiscan stress test which was abnormal. She subsequently underwent a coronary angiogram on September 28, 2022 which revealed normal coronary arteries. She also completed an echocardiogram while inpatient which revealed an ejection fraction of 60%. She presented back to the emergency room on October 13, 2022 with complaints of dizziness and chest pain. She states she was given nitroglycerin via EMS in route to the emergency room which " "relieved her chest pain. She's been seen in [facility] on 10/18/22. Of note, patient's A1c increased to 7.7% (9/27/22) from 6.4% at last visit (at goal with Glipizide), and her glucose has been running in the 200s-300s. She's been started on Metformin 500 mg po BID, in addition to, Glipizide 10 mg po daily. She is also taking Losartan. Previously unable to tolerate ACE-I. Tolerating ARB okay. Admits glucose still "up and down since starting Metformin." States feels Glipizide needs to be changed. At this time, she's asymptomatic. No other concerns.     12/7/2022: Ms. Singh is presenting for routine f/u DM. At last visit, Glipizide was discontinued and patient was started on Farxiga 5 mg po daily, in addition to, Metformin. She is tolerating medication well. Admits urinary frequency but denies dysuria, fever, chills, abd/flank pain. Denies mycotic symptoms. Labs reviewed as below.    She is c/o recurrent lower back pain.    12/05/22 11:04  Sodium: 140  Potassium: 4.1  Chloride: 110 (H)  CO2: 21 (L)  BUN: 13.9  Creatinine: 1.16 (H)  eGFR: 54  Glucose: 146 (H)  Calcium: 9.8  Alkaline Phosphatase: 118  PROTEIN TOTAL: 7.7 (H)  Albumin: 3.7  Albumin/Globulin Ratio: 0.9 (L)  BILIRUBIN TOTAL: 0.4  AST: 11  ALT: 20  Globulin, Total: 4.0 (H)  Hemoglobin A1C External: 6.3  Estimated Avg Glucose: 134.1    4/26/23: Ms. Singh is presenting for routine DM f/u. States Farxiga working well. Average CBGs decreased by approximately 20 pts. Averaging 120s. She is tolerating well. C/w Metformin. She denies overt s/s of hypoglycemia.    She is due for eye exam today.    Previously referred to Dr. Coates in [place] for chronic back pain but relates never received an appt.    Diabetes  She presents for her follow-up diabetic visit. She has type 2 diabetes mellitus. There are no hypoglycemic associated symptoms. There are no diabetic associated symptoms. Pertinent negatives for diabetes include no chest pain. There are no hypoglycemic " complications. Symptoms are stable. Risk factors for coronary artery disease include diabetes mellitus, dyslipidemia, obesity, hypertension and post-menopausal. Current diabetic treatment includes oral agent (dual therapy). She is compliant with treatment all of the time. Her home blood glucose trend is decreasing steadily. Her breakfast blood glucose range is generally 110-130 mg/dl. An ACE inhibitor/angiotensin II receptor blocker is being taken.     Review of Systems     Review of Systems   Respiratory:  Negative for apnea, cough, choking, chest tightness, shortness of breath, wheezing and stridor.    Cardiovascular:  Negative for chest pain, palpitations and leg swelling.   Musculoskeletal:  Positive for back pain.   All other systems reviewed and are negative.    Medical / Social / Family History     Past Medical History:   Diagnosis Date    Cancer 2018    CHF (congestive heart failure) 2012    Diabetes mellitus 2019    Disorder of kidney and ureter 2013    GERD (gastroesophageal reflux disease) 2013    Hypertension 2011    Sleep apnea 2013       Past Surgical History:   Procedure Laterality Date    ANGIOGRAM, CORONARY, WITH LEFT HEART CATHETERIZATION N/A 9/28/2022    Procedure: Angiogram, Coronary, with Left Heart Cath;  Surgeon: Milton Connolly MD;  Location: [facility] CATH LAB;  Service: Cardiology;  Laterality: N/A;    BILATERAL TUBAL LIGATION      Biopsy Gastrointestinal  11/13/2019    BIOPSY OF THYROID  07/2019    Catheter Inserrtion [facility]      [id number]    CERVICAL CONIZATION   W/ LASER  05/01/2018    COLONOSCOPY  05/10/2019    COLONOSCOPY  09/24/2014    DILATION AND CURETTAGE OF UTERUS  05/01/2018    ESOPHAGOGASTRODUODENOSCOPY  09/24/2014    ESOPHAGOGASTRODUODENOSCOPY  11/13/2019    Left breast small mass removal      Myomectomy Hysteroscopic  05/01/2018    POLYPECTOMY  05/10/2019    POLYPECTOMY  09/24/2014    TUBAL LIGATION         Social History  Ms.  reports that she has been smoking cigarettes. She has  "been exposed to tobacco smoke. She has never used smokeless tobacco. She reports that she does not currently use alcohol after a past usage of about 1.0 standard drink per week. She reports that she does not currently use drugs.    Family History  Ms.'s family history includes Cirrhosis in her brother; Diabetes in her father; Esophageal cancer in her brother; Heart attack in her father; Heart failure in her mother; Hypertension in her brother, father, and mother; Kidney failure in her mother; Leukemia in her brother.    Medications and Allergies     Medications  Current Outpatient Medications   Medication Instructions    amLODIPine (NORVASC) 10 mg, Oral, Daily    aspirin (ECOTRIN) 81 mg, Oral, Daily    baclofen (LIORESAL) 10 mg, Oral, 3 times daily PRN    carvediloL (COREG) 25 mg, Oral, 2 times daily    dapagliflozin (FARXIGA) 5 mg, Oral, Daily    furosemide (LASIX) 40 mg, Oral, Daily    gabapentin (NEURONTIN) 600 mg, Oral, 3 times daily    hydrALAZINE (APRESOLINE) 50 mg, Oral, 2 times daily    losartan (COZAAR) 25 mg, Oral, Daily    metFORMIN (GLUCOPHAGE) 500 mg, Oral, 2 times daily with meals    nitroGLYCERIN (NITROSTAT) 0.4 mg, Sublingual, Every 5 min PRN    rosuvastatin (CRESTOR) 10 mg, Oral, Nightly       Allergies  Review of patient's allergies indicates:   Allergen Reactions    Lisinopril Swelling     Other reaction(s): Angioedema of lips       Physical Examination   Visit Vitals  /84 (BP Location: Left arm, Patient Position: Sitting, BP Method: Large (Automatic))   Pulse 65   Temp 97.9 °F (36.6 °C) (Oral)   Resp 20   Ht 5' 8" (1.727 m)   Wt 129.5 kg (285 lb 6.4 oz)   BMI 43.39 kg/m²     Physical Exam  Constitutional:       Appearance: Normal appearance. She is obese.   HENT:      Head: Normocephalic and atraumatic.   Cardiovascular:      Rate and Rhythm: Normal rate and regular rhythm.      Pulses:           Dorsalis pedis pulses are 1+ on the right side and 1+ on the left side.      Heart sounds: " Normal heart sounds.   Pulmonary:      Effort: Pulmonary effort is normal.      Breath sounds: Normal breath sounds.   Musculoskeletal:         General: Normal range of motion.      Right lower leg: No edema.      Left lower leg: No edema.        Feet:    Feet:      Right foot:      Protective Sensation: 9 sites tested.  9 sites sensed.      Skin integrity: Callus present.      Left foot:      Protective Sensation: 9 sites tested.  9 sites sensed.   Skin:     General: Skin is warm and dry.   Neurological:      General: No focal deficit present.      Mental Status: She is alert and oriented to person, place, and time.   Psychiatric:         Mood and Affect: Mood normal.         Behavior: Behavior normal.         Thought Content: Thought content normal.         Judgment: Judgment normal.         Results     Chemistry:  Lab Results   Component Value Date     04/13/2023    K 4.1 04/13/2023    CHLORIDE 108 (H) 04/13/2023    BUN 12.0 04/13/2023    CREATININE 0.93 04/13/2023    EGFRNORACEVR >60 04/13/2023    GLUCOSE 126 (H) 04/13/2023    CALCIUM 9.8 04/13/2023    ALKPHOS 118 12/05/2022    LABPROT 7.7 (H) 12/05/2022    ALBUMIN 3.7 12/05/2022    BILIDIR 0.2 04/13/2022    IBILI 0.20 04/13/2022    AST 11 12/05/2022    ALT 20 12/05/2022    MG 1.60 09/29/2022    PHOS 4.7 09/29/2022        Lab Results   Component Value Date    HGBA1C 6.3 12/05/2022        Hematology:  Lab Results   Component Value Date    WBC 7.7 12/05/2022    HGB 11.6 (L) 12/05/2022    HCT 37.6 12/05/2022     12/05/2022       Lipid Panel:  Lab Results   Component Value Date    CHOL 115 04/13/2022    HDL 45 04/13/2022    LDL 56.00 04/13/2022    TRIG 72 04/13/2022    TOTALCHOLEST 3 04/13/2022        Urine:  Lab Results   Component Value Date    COLORUA Yellow 10/13/2022    APPEARANCEUA Clear 10/13/2022    SGUA 1.015 10/13/2022    PHUA 5.5 10/13/2022    PROTEINUA Negative 10/13/2022    GLUCOSEUA Negative 10/13/2022    KETONESUA Negative 10/13/2022     BLOODUA Negative 10/13/2022    NITRITESUA Negative 10/13/2022    LEUKOCYTESUR Negative 10/13/2022    RBCUA <5 10/13/2022    WBCUA <5 10/13/2022    BACTERIA None Seen 10/13/2022    SQEPUA  (A) 10/13/2021    HYALINECASTS None 04/13/2022    CREATRANDUR 145.4 04/13/2022          Assessment        ICD-10-CM ICD-9-CM   1. Type 2 diabetes mellitus without complication, without long-term current use of insulin  E11.9 250.00   2. Wellness examination  Z00.00 V70.0   3. Screening for diabetic retinopathy  Z13.5 V80.2   4. Asymptomatic postsurgical menopause  E89.40 256.2   5. Asymptomatic postmenopausal state  Z78.0 V49.81   6. Primary hypertension  I10 401.9   7. Degenerative disc disease, lumbar  M51.36 722.52   8. Foot callus  L84 700        Plan (including Health Maintenance)     Problem List Items Addressed This Visit          Neuro    Degenerative disc disease, lumbar    Current Assessment & Plan     Staff to re-send referral to Dr. Coates today              Derm    Foot callus    Current Assessment & Plan     Defers WC referral for now. Will monitor  Diabetic foot care discussed              Cardiac/Vascular    Primary hypertension    Overview       Currently on Hydralazine 50mg BID, Carvedilol 25mg BID, Amlodipine 10mg every day, Losartan 25 mg po daily           Relevant Medications    losartan (COZAAR) 25 MG tablet       Endocrine    Diabetes - Primary    Overview     Current medications: Farxiga 5 mg po daily, Metformin 500 mg po BID  A1c level: 6.3% (12/2022), 7.7% 9/27/22, previously 6.4%, goal <7  CBG trends: < 150  Educated on diabetic diet: Low-fat, Low-carb, Low-cholesterol. Avoid sugary/dark drinks/sodas and white foods (i.e., bread, pasta, potatoes, rice)  Aerobic exercise: 20-30 min/day x 5 days/week  Diabetic Eye Exam: fundus 5/2022, no DR  Diabetic Foot Exam: 4/26/23, calluses region to right ft, states asymptomatic and present x years. Monitor closely  Microalbumin-U: slightly elevated  Kidney  Protection: ARB  Statin Therapy: Yes, LDL 50s               Current Assessment & Plan     Continue Metformin and Farxiga  F/u 3 months w. labs           Relevant Medications    dapagliflozin (FARXIGA) 5 mg Tab tablet    metFORMIN (GLUCOPHAGE) 500 MG tablet    Other Relevant Orders    TSH    CBC Auto Differential    Lipid Panel    Urinalysis, Reflex to Urine Culture    Microalbumin/Creatinine Ratio, Urine    Comprehensive Metabolic Panel    Hemoglobin A1C       Other    Wellness examination    Overview     Health Maintenance:  Colon Ca Screening-colonoscopy 5/2019--hyperplastic polyp  Breast Ca Screening-Mammo 1/5/22, benign; Mammogram BI-RADS: 2 Benign, 1/11/23  Bone Density 12/2020 WNL  S/p total hysterectomy 2/2 uterine cancer              Other Visit Diagnoses       Screening for diabetic retinopathy        Relevant Orders    Diabetic Eye Screening Photo    Asymptomatic postsurgical menopause        Asymptomatic postmenopausal state        Relevant Orders    DXA Bone Density Axial Skeleton 1 or more sites              Health Maintenance Due   Topic Date Due    Cervical Cancer Screening  Never done    COVID-19 Vaccine (3 - Booster for Moderna series) 06/04/2021    Lipid Panel  04/13/2023    Diabetes Urine Screening  04/13/2023    Foot Exam  05/11/2023    Eye Exam  05/11/2023       Future Appointments   Date Time Provider Department Center   7/26/2023  8:45 AM MARICHUY Leonardo [facility] [place]   8/31/2023  1:00 PM Milton Connolly [facility]         Follow up in about 3 months (around 7/26/2023).    Signature:  MARICHUY Leonardo  [facility]  [address] [facility] [address]    Date of encounter: 4/26/23

## 2023-05-17 ENCOUNTER — PATIENT MESSAGE (OUTPATIENT)
Dept: INTERNAL MEDICINE | Facility: CLINIC | Age: 63
End: 2023-05-17
Payer: MEDICARE

## 2023-05-18 ENCOUNTER — TELEPHONE (OUTPATIENT)
Dept: INTERNAL MEDICINE | Facility: CLINIC | Age: 63
End: 2023-05-18
Payer: MEDICARE

## 2023-05-18 ENCOUNTER — PATIENT MESSAGE (OUTPATIENT)
Dept: INTERNAL MEDICINE | Facility: CLINIC | Age: 63
End: 2023-05-18
Payer: MEDICARE

## 2023-05-18 NOTE — TELEPHONE ENCOUNTER
Yuni with Dr Sixto Coates [facility] office states in order to go ahead with the referral the patient will need a MRI of CT of Lumbar that is within a year.

## 2023-05-18 NOTE — TELEPHONE ENCOUNTER
Schedule a virtual visit 5/29/23 for us to discuss recs and move forward with how to order requesting imaging.

## 2023-05-22 ENCOUNTER — TELEPHONE (OUTPATIENT)
Dept: INTERNAL MEDICINE | Facility: CLINIC | Age: 63
End: 2023-05-22
Payer: MEDICARE

## 2023-05-22 ENCOUNTER — OFFICE VISIT (OUTPATIENT)
Dept: INTERNAL MEDICINE | Facility: CLINIC | Age: 63
End: 2023-05-22
Payer: MEDICARE

## 2023-05-22 VITALS
HEART RATE: 65 BPM | BODY MASS INDEX: 43.19 KG/M2 | TEMPERATURE: 98 F | WEIGHT: 285 LBS | SYSTOLIC BLOOD PRESSURE: 138 MMHG | RESPIRATION RATE: 20 BRPM | HEIGHT: 68 IN | DIASTOLIC BLOOD PRESSURE: 82 MMHG

## 2023-05-22 DIAGNOSIS — M54.9 DORSALGIA, UNSPECIFIED: Primary | ICD-10-CM

## 2023-05-22 DIAGNOSIS — M54.42 CHRONIC BILATERAL LOW BACK PAIN WITH LEFT-SIDED SCIATICA: ICD-10-CM

## 2023-05-22 DIAGNOSIS — G89.29 CHRONIC BILATERAL LOW BACK PAIN WITH LEFT-SIDED SCIATICA: ICD-10-CM

## 2023-05-22 PROCEDURE — 1159F PR MEDICATION LIST DOCUMENTED IN MEDICAL RECORD: ICD-10-PCS | Mod: CPTII,,, | Performed by: NURSE PRACTITIONER

## 2023-05-22 PROCEDURE — 4010F ACE/ARB THERAPY RXD/TAKEN: CPT | Mod: CPTII,,, | Performed by: NURSE PRACTITIONER

## 2023-05-22 PROCEDURE — 1160F PR REVIEW ALL MEDS BY PRESCRIBER/CLIN PHARMACIST DOCUMENTED: ICD-10-PCS | Mod: CPTII,,, | Performed by: NURSE PRACTITIONER

## 2023-05-22 PROCEDURE — 99214 PR OFFICE/OUTPT VISIT, EST, LEVL IV, 30-39 MIN: ICD-10-PCS | Mod: S$PBB,,, | Performed by: NURSE PRACTITIONER

## 2023-05-22 PROCEDURE — 3008F BODY MASS INDEX DOCD: CPT | Mod: CPTII,,, | Performed by: NURSE PRACTITIONER

## 2023-05-22 PROCEDURE — 3008F PR BODY MASS INDEX (BMI) DOCUMENTED: ICD-10-PCS | Mod: CPTII,,, | Performed by: NURSE PRACTITIONER

## 2023-05-22 PROCEDURE — 99214 OFFICE O/P EST MOD 30 MIN: CPT | Mod: S$PBB,,, | Performed by: NURSE PRACTITIONER

## 2023-05-22 PROCEDURE — 1160F RVW MEDS BY RX/DR IN RCRD: CPT | Mod: CPTII,,, | Performed by: NURSE PRACTITIONER

## 2023-05-22 PROCEDURE — 99215 OFFICE O/P EST HI 40 MIN: CPT | Mod: PBBFAC | Performed by: NURSE PRACTITIONER

## 2023-05-22 PROCEDURE — 3075F SYST BP GE 130 - 139MM HG: CPT | Mod: CPTII,,, | Performed by: NURSE PRACTITIONER

## 2023-05-22 PROCEDURE — 3079F PR MOST RECENT DIASTOLIC BLOOD PRESSURE 80-89 MM HG: ICD-10-PCS | Mod: CPTII,,, | Performed by: NURSE PRACTITIONER

## 2023-05-22 PROCEDURE — 3079F DIAST BP 80-89 MM HG: CPT | Mod: CPTII,,, | Performed by: NURSE PRACTITIONER

## 2023-05-22 PROCEDURE — 1159F MED LIST DOCD IN RCRD: CPT | Mod: CPTII,,, | Performed by: NURSE PRACTITIONER

## 2023-05-22 PROCEDURE — 4010F PR ACE/ARB THEARPY RXD/TAKEN: ICD-10-PCS | Mod: CPTII,,, | Performed by: NURSE PRACTITIONER

## 2023-05-22 PROCEDURE — 3075F PR MOST RECENT SYSTOLIC BLOOD PRESS GE 130-139MM HG: ICD-10-PCS | Mod: CPTII,,, | Performed by: NURSE PRACTITIONER

## 2023-05-22 RX ORDER — LIDOCAINE 50 MG/G
1 PATCH TOPICAL DAILY PRN
Qty: 10 PATCH | Refills: 2 | Status: SHIPPED | OUTPATIENT
Start: 2023-05-22

## 2023-05-22 NOTE — ASSESSMENT & PLAN NOTE
MRI L Spine  Refilled Lidocaine patches for PRN use  Staff have confirmed that Dr. Coates' office did receive the referral but an MRI is requesting first. Pt states has to go to [facility] for open MRI due to size and claustrophobia. Staff to arrange  ED precautions  No saddle anesthesia at present  Defers repeat P.T. for now due to pain

## 2023-05-22 NOTE — PROGRESS NOTES
MARICHUY Leonardo   [facility]  [address] [facility] [id number]      PATIENT NAME: Fernanda Singh  : 1960  DATE: 23  MRN: [id number]        Reason for Visit / Chief Complaint: Back Pain       History of Present Illness / Problem Focused Workflow     Fernanda Singh presents to the clinic with Back Pain     Initial Visit (3/30/21): 60 y.o. AA female presenting to [facility] [place] to establish primary care.   Previous PCP: Rachel Goff 2020   PmHx: mild CAD, hypertension, GERD, CKD, chronic tobacco use, obesity, breast mass (), uterine CA (2017), H.Pylori (), colon polyps  SHx: left breast lumpectomy (), thyroid bx (2019), tubal, total hysterectomy (), EGD , colonoscopy 2019--hyperplastic polyp   FHx: Esophageal cancer (brother), cirrhosis (brother), Renal failure, heart dz, HF, HTN, HLD   Complaints today: Establish care      Ms. Michael is a pleasant 61 yo female presenting to re-establish primary care. Has not followed up with a PCP since . States previous PCP moved. She is following [facility] clinic here for CAD and CHF (per report). Also following Heme/Onc and GYN clinics for h/o uterine ca diagnosed in . She had a left breast mass removed in . [facility] path was benign. She is UTD on colon cancer screening. H/o colon polyp. Recommended repeat colonoscopy 2024. She has a h/o diabetes diagnosed ~1-2 years ago. Was given Glipizide by last PCP before she moved. No recent A1c on file. H/o goiter with thyroid nodules. [facility] had a thyroid bx 2019 that was negative per report. She needs a refill on Glipizide. No other problems stated.     Health Maintenance:   Colon Ca Screening-colonoscopy 2019--hyperplastic polyp  Breast Ca Screening-Mammo 2020, benign   Bone Density 2020 WNL     (21): Pt presenting to review labs. Since last visit, she was seen in [facility] 21. Underwent fundus  "photo which was negative for DR.     Lab review:      A1c 6.8   LDL 48   TSH WNL   RBCs on UA. Noted intermittently since 2019.      Thyroid US 4/29/21 revealed a multinodular thyroid with at least one thyroid nodule on left side (1.9 cm) meeting the criteria for FNA.      Pt c/o right hand pain and numbness to first three fingers. Concerned about CTS. States remodeling home and some of the tools have been difficult to use. C/o stiffness and inability to close hand into a fist on occasion upon awakening.   Denies fever, chills, cough, chest pain, SOB, abd pain, dysuria, gross hematuria, leg pain, or any other concerns.     (10/15/21): Pt presenting for routine f/u.   Lab review:   HgA1c 5.7%      Renal indices stable   FLP WNL   Hgb 11.9-stable and improved   RBCs on UA 6-10   [place] [facility] [place] overt path on CT other than renal cyst of the lower right kidney. Urine cytology negative. States GYN explained that RBCS in UA likely due to vaginal irritation/inflammation 2/2 radiation tx. No gross hematuria.      C/o lower back pain x 1 mth. Long-standing hx of back pain but worse over past month. No overt injuries/accidents or anything "out of the norm." States pain worse at night, occasionally awakening her from her sleep. Exacerbated by standing > 10 min or strenuous activity over the course of the day. Denies falls, lower ext weakness, B/B incontinence. Relieved with rest and Naprosyn PRN--takes at night. States years since last imaging.      Bp slightly elevated. Asymptomatic. Taking medications as prescribed. F/u with [facility] today.     (5/11/22): Pt presenting for routine f/u. She had labs completed in 4/2022 but missed a f/u to review. She was referred to ENT last April for a multinodular thyroid with one meeting criteria for FNA but didn't make appt. Also states she needs to call to re-schedule appts in GYN, oncologic GYN in [place], and [facility] clinic. She had a negative screening " "mammogram (BI-RAD 2) 1/5/2022. Today, she continues with lower back pain as mentioned at last visit. She was referred to P.T. but ultimately declined appt at the time. Long-standing hx of back pain but worse over past month. No overt injuries/accidents or anything "out of the norm." States pain worse at night, occasionally awakening her from her sleep. Exacerbated by standing > 10 min or strenuous activity over the course of the day. Denies falls, lower ext weakness, B/B incontinence. Relieved with rest and Naprosyn PRN--takes at night. She's also used Flexeril in the past with some relief     (7/14/22): Pt presenting for routine f/u. Bp elevated at last visit; at goal today. Taking medications as prescribed. Previously referred to PT for chronic lower back pain ongoing at least 10 years. Now following [facility] in [place], [place]. Initial eval 5/31/22. Plans were for PT 3x/week x 6 weeks (with certification period ending 7/11/22). Pt states she has only been able to afford PT once a week. States "somewhat" helpful. No saddle anesthesia, B/B incontinence, or falls. Requesting refill on Baclofen. She's seen GYN and ENT since last appt. Stable and doing well. No other concerns.     10/24/22: Patient is presenting for f/u following a brief hospitalization in September and ED visit earlier this month. Patient had a recent hospital admission on September 26, 2022 due to complaints of chest pain and shortness of breath. She was admitted to r/o ACS. She underwent an inpatient Lexiscan stress test which was abnormal. She subsequently underwent a coronary angiogram on September 28, 2022 which revealed normal coronary arteries. She also completed an echocardiogram while inpatient which revealed an ejection fraction of 60%. She presented back to the emergency room on October 13, 2022 with complaints of dizziness and chest pain. She states she was given nitroglycerin via EMS in route to the emergency room which relieved her " "chest pain. She's been seen in [facility] on 10/18/22. Of note, patient's A1c increased to 7.7% (9/27/22) from 6.4% at last visit (at goal with Glipizide), and her glucose has been running in the 200s-300s. She's been started on Metformin 500 mg po BID, in addition to, Glipizide 10 mg po daily. She is also taking Losartan. Previously unable to tolerate ACE-I. Tolerating ARB okay. Admits glucose still "up and down since starting Metformin." States feels Glipizide needs to be changed. At this time, she's asymptomatic. No other concerns.      12/7/2022: Ms. Singh is presenting for routine f/u DM. At last visit, Glipizide was discontinued and patient was started on Farxiga 5 mg po daily, in addition to, Metformin. She is tolerating medication well. Admits urinary frequency but denies dysuria, fever, chills, abd/flank pain. Denies mycotic symptoms. Labs reviewed as below.     She is c/o recurrent lower back pain.     12/05/22 11:04  Sodium: 140  Potassium: 4.1  Chloride: 110 (H)  CO2: 21 (L)  BUN: 13.9  Creatinine: 1.16 (H)  eGFR: 54  Glucose: 146 (H)  Calcium: 9.8  Alkaline Phosphatase: 118  PROTEIN TOTAL: 7.7 (H)  Albumin: 3.7  Albumin/Globulin Ratio: 0.9 (L)  BILIRUBIN TOTAL: 0.4  AST: 11  ALT: 20  Globulin, Total: 4.0 (H)  Hemoglobin A1C External: 6.3  Estimated Avg Glucose: 134.1     4/26/23: Ms. Singh is presenting for routine DM f/u. States Farxiga working well. Average CBGs decreased by approximately 20 pts. Averaging 120s. She is tolerating well. C/w Metformin. She denies overt s/s of hypoglycemia.     She is due for eye exam today.     Previously referred to Dr. Coates in [place] for chronic back pain but relates never received an appt.     Diabetes  She presents for her follow-up diabetic visit. She has type 2 diabetes mellitus. There are no hypoglycemic associated symptoms. There are no diabetic associated symptoms. Pertinent negatives for diabetes include no chest pain. There are no hypoglycemic " "complications. Symptoms are stable. Risk factors for coronary artery disease include diabetes mellitus, dyslipidemia, obesity, hypertension and post-menopausal. Current diabetic treatment includes oral agent (dual therapy). She is compliant with treatment all of the time. Her home blood glucose trend is decreasing steadily. Her breakfast blood glucose range is generally 110-130 mg/dl. An ACE inhibitor/angiotensin II receptor blocker is being taken.     5/22/23: Patient presenting with an acute on chronic exacerbation of lower back pain radiating down both legs, L > R, x 1-2 weeks.   Previously referred to PT for chronic lower back pain ongoing at least 10 years. Now following [facility] in [place], [place]. Initial eval 5/31/22. Plans were for PT 3x/week x 6 weeks (with certification period ending 7/11/22). Pt states she was only been able to afford PT once a week. States was "somewhat" helpful.    Also previously referred to Dr. Coates in [place] for her chronic back pain a few visits ago but never received an appt.     She states Baclofen just makes her sleep. Gabapentin makes her too "groggy" all day. Lidocaine patches dull the pain. Has tried marijuana for pain mgmt with some relief.      Back Pain  This is a chronic problem. The current episode started more than 1 year ago. The problem occurs daily. The problem has been gradually worsening since onset. The pain is present in the lumbar spine. The quality of the pain is described as shooting and burning. The pain radiates to the left thigh, left knee and left foot. The pain is at a severity of 10/10. The pain is severe. The symptoms are aggravated by bending, sitting, standing and twisting. Associated symptoms include leg pain, numbness, paresthesias, tingling and weakness. Pertinent negatives include no abdominal pain, bladder incontinence, bowel incontinence, chest pain, dysuria, fever, headaches, paresis, pelvic pain, perianal numbness or weight loss. Risk " factors include history of cancer, sedentary lifestyle, obesity and menopause. She has tried muscle relaxant (lidocaine patches, gabapentin) for the symptoms. The treatment provided mild relief.     Review of Systems     Review of Systems   Constitutional: Negative.  Negative for fever, unexpected weight change and weight loss.   HENT: Negative.     Eyes: Negative.    Respiratory: Negative.  Negative for shortness of breath.    Cardiovascular: Negative.  Negative for chest pain.   Gastrointestinal: Negative.  Negative for abdominal pain and bowel incontinence.   Endocrine: Negative.    Genitourinary: Negative.  Negative for bladder incontinence, dysuria and pelvic pain.   Musculoskeletal:  Positive for back pain.   Skin: Negative.    Allergic/Immunologic: Negative.    Neurological:  Positive for tingling, weakness, numbness and paresthesias. Negative for headaches.   Hematological: Negative.    Psychiatric/Behavioral: Negative.       Medical / Social / Family History     Past Medical History:   Diagnosis Date    Cancer 2018    CHF (congestive heart failure) 2012    Diabetes mellitus 2019    Disorder of kidney and ureter 2013    GERD (gastroesophageal reflux disease) 2013    Hypertension 2011    Sleep apnea 2013       Past Surgical History:   Procedure Laterality Date    ANGIOGRAM, CORONARY, WITH LEFT HEART CATHETERIZATION N/A 9/28/2022    Procedure: Angiogram, Coronary, with Left Heart Cath;  Surgeon: Milton Connolly MD;  Location: [facility] CATH LAB;  Service: Cardiology;  Laterality: N/A;    BILATERAL TUBAL LIGATION      Biopsy Gastrointestinal  11/13/2019    BIOPSY OF THYROID  07[facility]2019    [facility]      [id number]    CERVICAL CONIZATION   W/ LASER  05/01/2018    COLONOSCOPY  05/10/2019    COLONOSCOPY  09/24/2014    DILATION AND CURETTAGE OF UTERUS  05/01/2018    ESOPHAGOGASTRODUODENOSCOPY  09/24/2014    ESOPHAGOGASTRODUODENOSCOPY  11/13/2019    Left breast small mass removal      Myomectomy  "Hysteroscopic  05/01/2018    POLYPECTOMY  05/10/2019    POLYPECTOMY  09/24/2014    TUBAL LIGATION         Social History  Ms.  reports that she has been smoking cigarettes. She has a 7.50 pack-year smoking history. She has been exposed to tobacco smoke. She has never used smokeless tobacco. She reports that she does not currently use alcohol after a past usage of about 1.0 standard drink per week. She reports that she does not currently use drugs.    Family History  Ms.'s family history includes Cirrhosis in her brother; Diabetes in her father; Esophageal cancer in her brother; Heart attack in her father; Heart failure in her mother; Hypertension in her brother, father, and mother; Kidney failure in her mother; Leukemia in her brother.    Medications and Allergies     Medications  Current Outpatient Medications   Medication Instructions    amLODIPine (NORVASC) 10 mg, Oral, Daily    aspirin (ECOTRIN) 81 mg, Oral, Daily    baclofen (LIORESAL) 10 mg, Oral, 3 times daily PRN    carvediloL (COREG) 25 mg, Oral, 2 times daily    dapagliflozin (FARXIGA) 5 mg, Oral, Daily    furosemide (LASIX) 40 mg, Oral, Daily    gabapentin (NEURONTIN) 600 mg, Oral, 3 times daily    hydrALAZINE (APRESOLINE) 50 mg, Oral, 2 times daily    LIDOcaine (LIDODERM) 5 % 1 patch, Transdermal, Daily PRN, Remove & Discard patch within 12 hours or as directed by MD    losartan (COZAAR) 25 mg, Oral, Daily    metFORMIN (GLUCOPHAGE) 500 mg, Oral, 2 times daily with meals    nitroGLYCERIN (NITROSTAT) 0.4 mg, Sublingual, Every 5 min PRN    rosuvastatin (CRESTOR) 10 mg, Oral, Nightly       Allergies  Review of patient's allergies indicates:   Allergen Reactions    Lisinopril Swelling     Other reaction(s): Angioedema of lips       Physical Examination   Visit Vitals  /82 (BP Location: Left arm, Patient Position: Sitting, BP Method: Large (Manual))   Pulse 65   Temp 98.1 °F (36.7 °C) (Oral)   Resp 20   Ht 5' 8" (1.727 m)   Wt 129.3 kg (285 lb)   BMI " 43.33 kg/m²     Physical Exam  Constitutional:       Appearance: Normal appearance. She is obese.   HENT:      Head: Normocephalic and atraumatic.   Cardiovascular:      Rate and Rhythm: Normal rate and regular rhythm.      Pulses: Normal pulses.   Pulmonary:      Effort: Pulmonary effort is normal.   Musculoskeletal:      Lumbar back: Spasms and tenderness present. Decreased range of motion.        Back:       Right lower leg: No edema.      Left lower leg: No edema.      Comments: Unable perform straight leg raise. Using a cane to ambulate   Skin:     General: Skin is warm and dry.   Neurological:      General: No focal deficit present.      Mental Status: She is alert and oriented to person, place, and time.   Psychiatric:         Mood and Affect: Mood normal.         Behavior: Behavior normal.         Thought Content: Thought content normal.         Judgment: Judgment normal.         Results     Chemistry:  Lab Results   Component Value Date     04/13/2023    K 4.1 04/13/2023    CHLORIDE 108 (H) 04/13/2023    BUN 12.0 04/13/2023    CREATININE 0.93 04/13/2023    EGFRNORACEVR >60 04/13/2023    GLUCOSE 126 (H) 04/13/2023    CALCIUM 9.8 04/13/2023    ALKPHOS 118 12/05/2022    LABPROT 7.7 (H) 12/05/2022    ALBUMIN 3.7 12/05/2022    BILIDIR 0.2 04/13/2022    IBILI 0.20 04/13/2022    AST 11 12/05/2022    ALT 20 12/05/2022    MG 1.60 09/29/2022    PHOS 4.7 09/29/2022        Lab Results   Component Value Date    HGBA1C 6.3 12/05/2022        Hematology:  Lab Results   Component Value Date    WBC 7.7 12/05/2022    HGB 11.6 (L) 12/05/2022    HCT 37.6 12/05/2022     12/05/2022       Lipid Panel:  Lab Results   Component Value Date    CHOL 115 04/13/2022    HDL 45 04/13/2022    LDL 56.00 04/13/2022    TRIG 72 04/13/2022    TOTALCHOLEST 3 04/13/2022        Urine:  Lab Results   Component Value Date    COLORUA Yellow 10/13/2022    APPEARANCEUA Clear 10/13/2022    SGUA 1.015 10/13/2022    PHUA 5.5 10/13/2022     PROTEINUA Negative 10/13/2022    GLUCOSEUA Negative 10/13/2022    KETONESUA Negative 10/13/2022    BLOODUA Negative 10/13/2022    NITRITESUA Negative 10/13/2022    LEUKOCYTESUR Negative 10/13/2022    RBCUA <5 10/13/2022    WBCUA <5 10/13/2022    BACTERIA None Seen 10/13/2022    SQEPUA  (A) 10/13/2021    HYALINECASTS None 04/13/2022    CREATRANDUR 145.4 04/13/2022          Assessment        ICD-10-CM ICD-9-CM   1. Dorsalgia, unspecified  M54.9 724.5   2. Chronic bilateral low back pain with left-sided sciatica  M54.42 724.2    G89.29 724.3     338.29        Plan (including Health Maintenance)     Problem List Items Addressed This Visit          Orthopedic    Chronic bilateral low back pain with left-sided sciatica    Current Assessment & Plan     MRI L Spine  Refilled Lidocaine patches for PRN use  Staff have confirmed that Dr. Coates' office did receive the referral but an MRI is requesting first. Pt states [facility] due to size and claustrophobia. Staff to arrange  ED precautions  No saddle anesthesia at present  Defers repeat P.T. for now due to pain           Relevant Medications    LIDOcaine (LIDODERM) 5 %    Other Relevant Orders    MRI Lumbar Spine Without Contrast     Other Visit Diagnoses       Dorsalgia, unspecified    -  Primary    Relevant Orders    MRI Lumbar Spine Without Contrast              Health Maintenance Due   Topic Date Due    COVID-19 Vaccine (3 - Booster for Moderna series) 06/04/2021    Lipid Panel  04/13/2023    Eye Exam  05/11/2023    Colorectal Cancer Screening  05/21/2023    Diabetes Urine Screening  04/13/2023       Future Appointments   Date Time Provider Department Center   7/26/2023  8:45 AM MARICHUY Leonardo [facility]   8/31/2023  1:00 PM Milton Connolly MD [facility]        No follow-ups on file.    Signature:  MARICHUY Leonardo  [facility]  [address]  [place] [address]    Date of encounter: 5/22/23

## 2023-06-19 ENCOUNTER — PATIENT MESSAGE (OUTPATIENT)
Dept: ADMINISTRATIVE | Facility: HOSPITAL | Age: 63
End: 2023-06-19
Payer: MEDICARE

## 2023-06-29 ENCOUNTER — PATIENT MESSAGE (OUTPATIENT)
Dept: INTERNAL MEDICINE | Facility: CLINIC | Age: 63
End: 2023-06-29
Payer: MEDICARE

## 2023-06-29 DIAGNOSIS — Z13.5 SCREENING FOR DIABETIC RETINOPATHY: Primary | ICD-10-CM

## 2023-06-29 DIAGNOSIS — H53.8 BLURRED VISION, BILATERAL: ICD-10-CM

## 2023-06-29 DIAGNOSIS — R11.0 NAUSEA: Primary | ICD-10-CM

## 2023-06-30 RX ORDER — DICYCLOMINE HYDROCHLORIDE 20 MG/1
20 TABLET ORAL EVERY 6 HOURS PRN
Qty: 30 TABLET | Refills: 0 | Status: SHIPPED | OUTPATIENT
Start: 2023-06-30 | End: 2023-07-30

## 2023-06-30 RX ORDER — ONDANSETRON 4 MG/1
4 TABLET, FILM COATED ORAL EVERY 12 HOURS PRN
Qty: 20 TABLET | Refills: 0 | Status: SHIPPED | OUTPATIENT
Start: 2023-06-30

## 2023-07-06 DIAGNOSIS — E11.9 TYPE 2 DIABETES MELLITUS WITHOUT COMPLICATION, WITHOUT LONG-TERM CURRENT USE OF INSULIN: ICD-10-CM

## 2023-07-10 ENCOUNTER — PATIENT MESSAGE (OUTPATIENT)
Dept: INTERNAL MEDICINE | Facility: CLINIC | Age: 63
End: 2023-07-10
Payer: MEDICARE

## 2023-07-13 ENCOUNTER — PATIENT MESSAGE (OUTPATIENT)
Dept: INTERNAL MEDICINE | Facility: CLINIC | Age: 63
End: 2023-07-13
Payer: MEDICARE

## 2023-07-14 RX ORDER — METFORMIN HYDROCHLORIDE 500 MG/1
500 TABLET ORAL 2 TIMES DAILY WITH MEALS
Qty: 180 TABLET | Refills: 0 | Status: SHIPPED | OUTPATIENT
Start: 2023-07-14 | End: 2023-11-27 | Stop reason: SDUPTHER

## 2023-07-17 ENCOUNTER — TELEPHONE (OUTPATIENT)
Dept: INTERNAL MEDICINE | Facility: CLINIC | Age: 63
End: 2023-07-17
Payer: MEDICARE

## 2023-07-17 DIAGNOSIS — F41.9 ANXIETY: Primary | ICD-10-CM

## 2023-07-18 RX ORDER — LORAZEPAM 0.5 MG/1
0.5 TABLET ORAL ONCE AS NEEDED
Qty: 1 TABLET | Refills: 0 | Status: SHIPPED | OUTPATIENT
Start: 2023-07-18 | End: 2023-07-26 | Stop reason: ALTCHOICE

## 2023-07-19 ENCOUNTER — TELEPHONE (OUTPATIENT)
Dept: INTERNAL MEDICINE | Facility: CLINIC | Age: 63
End: 2023-07-19
Payer: MEDICARE

## 2023-07-19 DIAGNOSIS — M47.816 LUMBAR SPONDYLOSIS: Primary | ICD-10-CM

## 2023-07-20 ENCOUNTER — LAB VISIT (OUTPATIENT)
Dept: LAB | Facility: HOSPITAL | Age: 63
End: 2023-07-20
Attending: NURSE PRACTITIONER
Payer: MEDICARE

## 2023-07-20 ENCOUNTER — DOCUMENTATION ONLY (OUTPATIENT)
Dept: INTERNAL MEDICINE | Facility: CLINIC | Age: 63
End: 2023-07-20

## 2023-07-20 DIAGNOSIS — E11.9 TYPE 2 DIABETES MELLITUS WITHOUT COMPLICATION, WITHOUT LONG-TERM CURRENT USE OF INSULIN: ICD-10-CM

## 2023-07-20 LAB
ALBUMIN SERPL-MCNC: 3.9 G/DL (ref 3.4–4.8)
ALBUMIN/GLOB SERPL: 1 RATIO (ref 1.1–2)
ALP SERPL-CCNC: 114 UNIT/L (ref 40–150)
ALT SERPL-CCNC: 16 UNIT/L (ref 0–55)
AST SERPL-CCNC: 11 UNIT/L (ref 5–34)
BASOPHILS # BLD AUTO: 0.04 X10(3)/MCL
BASOPHILS NFR BLD AUTO: 0.5 %
BILIRUBIN DIRECT+TOT PNL SERPL-MCNC: 0.4 MG/DL
BUN SERPL-MCNC: 14.6 MG/DL (ref 9.8–20.1)
CALCIUM SERPL-MCNC: 9.5 MG/DL (ref 8.4–10.2)
CHLORIDE SERPL-SCNC: 107 MMOL/L (ref 98–107)
CHOLEST SERPL-MCNC: 104 MG/DL
CHOLEST/HDLC SERPL: 2 {RATIO} (ref 0–5)
CO2 SERPL-SCNC: 25 MMOL/L (ref 23–31)
CREAT SERPL-MCNC: 1.02 MG/DL (ref 0.55–1.02)
EOSINOPHIL # BLD AUTO: 0.16 X10(3)/MCL (ref 0–0.9)
EOSINOPHIL NFR BLD AUTO: 2.2 %
ERYTHROCYTE [DISTWIDTH] IN BLOOD BY AUTOMATED COUNT: 16.5 % (ref 11.5–17)
EST. AVERAGE GLUCOSE BLD GHB EST-MCNC: 128.4 MG/DL
GFR SERPLBLD CREATININE-BSD FMLA CKD-EPI: >60 MLS/MIN/1.73/M2
GLOBULIN SER-MCNC: 4.1 GM/DL (ref 2.4–3.5)
GLUCOSE SERPL-MCNC: 143 MG/DL (ref 82–115)
HBA1C MFR BLD: 6.1 %
HCT VFR BLD AUTO: 41.4 % (ref 37–47)
HDLC SERPL-MCNC: 44 MG/DL (ref 35–60)
HGB BLD-MCNC: 12.7 G/DL (ref 12–16)
IMM GRANULOCYTES # BLD AUTO: 0.03 X10(3)/MCL (ref 0–0.04)
IMM GRANULOCYTES NFR BLD AUTO: 0.4 %
LDLC SERPL CALC-MCNC: 45 MG/DL (ref 50–140)
LYMPHOCYTES # BLD AUTO: 2.84 X10(3)/MCL (ref 0.6–4.6)
LYMPHOCYTES NFR BLD AUTO: 38.5 %
MCH RBC QN AUTO: 24.6 PG (ref 27–31)
MCHC RBC AUTO-ENTMCNC: 30.7 G/DL (ref 33–36)
MCV RBC AUTO: 80.2 FL (ref 80–94)
MONOCYTES # BLD AUTO: 0.49 X10(3)/MCL (ref 0.1–1.3)
MONOCYTES NFR BLD AUTO: 6.6 %
NEUTROPHILS # BLD AUTO: 3.82 X10(3)/MCL (ref 2.1–9.2)
NEUTROPHILS NFR BLD AUTO: 51.8 %
NRBC BLD AUTO-RTO: 0 %
PLATELET # BLD AUTO: 257 X10(3)/MCL (ref 130–400)
PMV BLD AUTO: 10.7 FL (ref 7.4–10.4)
POTASSIUM SERPL-SCNC: 3.8 MMOL/L (ref 3.5–5.1)
PROT SERPL-MCNC: 8 GM/DL (ref 5.8–7.6)
RBC # BLD AUTO: 5.16 X10(6)/MCL (ref 4.2–5.4)
SODIUM SERPL-SCNC: 141 MMOL/L (ref 136–145)
TRIGL SERPL-MCNC: 73 MG/DL (ref 37–140)
TSH SERPL-ACNC: 1 UIU/ML (ref 0.35–4.94)
VLDLC SERPL CALC-MCNC: 15 MG/DL
WBC # SPEC AUTO: 7.38 X10(3)/MCL (ref 4.5–11.5)

## 2023-07-20 PROCEDURE — 83036 HEMOGLOBIN GLYCOSYLATED A1C: CPT

## 2023-07-20 PROCEDURE — 80061 LIPID PANEL: CPT

## 2023-07-20 PROCEDURE — 85025 COMPLETE CBC W/AUTO DIFF WBC: CPT

## 2023-07-20 PROCEDURE — 84443 ASSAY THYROID STIM HORMONE: CPT

## 2023-07-20 PROCEDURE — 80053 COMPREHEN METABOLIC PANEL: CPT

## 2023-07-20 PROCEDURE — 36415 COLL VENOUS BLD VENIPUNCTURE: CPT

## 2023-07-25 ENCOUNTER — PATIENT MESSAGE (OUTPATIENT)
Dept: ADMINISTRATIVE | Facility: HOSPITAL | Age: 63
End: 2023-07-25
Payer: MEDICARE

## 2023-07-26 ENCOUNTER — OFFICE VISIT (OUTPATIENT)
Dept: INTERNAL MEDICINE | Facility: CLINIC | Age: 63
End: 2023-07-26
Payer: MEDICARE

## 2023-07-26 VITALS
BODY MASS INDEX: 43.47 KG/M2 | WEIGHT: 286.81 LBS | SYSTOLIC BLOOD PRESSURE: 148 MMHG | TEMPERATURE: 98 F | RESPIRATION RATE: 20 BRPM | HEART RATE: 59 BPM | DIASTOLIC BLOOD PRESSURE: 80 MMHG | HEIGHT: 68 IN

## 2023-07-26 DIAGNOSIS — I10 PRIMARY HYPERTENSION: ICD-10-CM

## 2023-07-26 DIAGNOSIS — M51.36 DEGENERATIVE DISC DISEASE, LUMBAR: ICD-10-CM

## 2023-07-26 DIAGNOSIS — Z00.00 WELLNESS EXAMINATION: Primary | ICD-10-CM

## 2023-07-26 DIAGNOSIS — E78.5 HYPERLIPIDEMIA LDL GOAL <70: ICD-10-CM

## 2023-07-26 DIAGNOSIS — E11.9 TYPE 2 DIABETES MELLITUS WITHOUT COMPLICATION, WITHOUT LONG-TERM CURRENT USE OF INSULIN: ICD-10-CM

## 2023-07-26 PROBLEM — G89.29 CHRONIC BILATERAL LOW BACK PAIN WITH LEFT-SIDED SCIATICA: Status: RESOLVED | Noted: 2023-05-22 | Resolved: 2023-07-26

## 2023-07-26 PROBLEM — R94.4 DECREASED GFR: Status: RESOLVED | Noted: 2022-12-07 | Resolved: 2023-07-26

## 2023-07-26 PROBLEM — C54.1 MALIGNANT NEOPLASM OF ENDOMETRIUM: Status: RESOLVED | Noted: 2022-07-13 | Resolved: 2023-07-26

## 2023-07-26 PROBLEM — C54.1 CARCINOSARCOMA OF ENDOMETRIUM: Status: RESOLVED | Noted: 2018-07-05 | Resolved: 2023-07-26

## 2023-07-26 PROBLEM — M54.42 CHRONIC BILATERAL LOW BACK PAIN WITH LEFT-SIDED SCIATICA: Status: RESOLVED | Noted: 2023-05-22 | Resolved: 2023-07-26

## 2023-07-26 PROCEDURE — 3061F PR NEG MICROALBUMINURIA RESULT DOCUMENTED/REVIEW: ICD-10-PCS | Mod: CPTII,,, | Performed by: NURSE PRACTITIONER

## 2023-07-26 PROCEDURE — 4010F PR ACE/ARB THEARPY RXD/TAKEN: ICD-10-PCS | Mod: CPTII,,, | Performed by: NURSE PRACTITIONER

## 2023-07-26 PROCEDURE — 99215 OFFICE O/P EST HI 40 MIN: CPT | Mod: PBBFAC | Performed by: NURSE PRACTITIONER

## 2023-07-26 PROCEDURE — 3044F HG A1C LEVEL LT 7.0%: CPT | Mod: CPTII,,, | Performed by: NURSE PRACTITIONER

## 2023-07-26 PROCEDURE — 99214 OFFICE O/P EST MOD 30 MIN: CPT | Mod: S$PBB,,, | Performed by: NURSE PRACTITIONER

## 2023-07-26 PROCEDURE — 1159F MED LIST DOCD IN RCRD: CPT | Mod: CPTII,,, | Performed by: NURSE PRACTITIONER

## 2023-07-26 PROCEDURE — 3008F BODY MASS INDEX DOCD: CPT | Mod: CPTII,,, | Performed by: NURSE PRACTITIONER

## 2023-07-26 PROCEDURE — 1160F RVW MEDS BY RX/DR IN RCRD: CPT | Mod: CPTII,,, | Performed by: NURSE PRACTITIONER

## 2023-07-26 PROCEDURE — 99214 PR OFFICE/OUTPT VISIT, EST, LEVL IV, 30-39 MIN: ICD-10-PCS | Mod: S$PBB,,, | Performed by: NURSE PRACTITIONER

## 2023-07-26 PROCEDURE — 3079F DIAST BP 80-89 MM HG: CPT | Mod: CPTII,,, | Performed by: NURSE PRACTITIONER

## 2023-07-26 PROCEDURE — 3066F PR DOCUMENTATION OF TREATMENT FOR NEPHROPATHY: ICD-10-PCS | Mod: CPTII,,, | Performed by: NURSE PRACTITIONER

## 2023-07-26 PROCEDURE — 3077F PR MOST RECENT SYSTOLIC BLOOD PRESSURE >= 140 MM HG: ICD-10-PCS | Mod: CPTII,,, | Performed by: NURSE PRACTITIONER

## 2023-07-26 PROCEDURE — 3061F NEG MICROALBUMINURIA REV: CPT | Mod: CPTII,,, | Performed by: NURSE PRACTITIONER

## 2023-07-26 PROCEDURE — 4010F ACE/ARB THERAPY RXD/TAKEN: CPT | Mod: CPTII,,, | Performed by: NURSE PRACTITIONER

## 2023-07-26 PROCEDURE — 3066F NEPHROPATHY DOC TX: CPT | Mod: CPTII,,, | Performed by: NURSE PRACTITIONER

## 2023-07-26 PROCEDURE — 1159F PR MEDICATION LIST DOCUMENTED IN MEDICAL RECORD: ICD-10-PCS | Mod: CPTII,,, | Performed by: NURSE PRACTITIONER

## 2023-07-26 PROCEDURE — 1160F PR REVIEW ALL MEDS BY PRESCRIBER/CLIN PHARMACIST DOCUMENTED: ICD-10-PCS | Mod: CPTII,,, | Performed by: NURSE PRACTITIONER

## 2023-07-26 PROCEDURE — 3008F PR BODY MASS INDEX (BMI) DOCUMENTED: ICD-10-PCS | Mod: CPTII,,, | Performed by: NURSE PRACTITIONER

## 2023-07-26 PROCEDURE — 3077F SYST BP >= 140 MM HG: CPT | Mod: CPTII,,, | Performed by: NURSE PRACTITIONER

## 2023-07-26 PROCEDURE — 3079F PR MOST RECENT DIASTOLIC BLOOD PRESSURE 80-89 MM HG: ICD-10-PCS | Mod: CPTII,,, | Performed by: NURSE PRACTITIONER

## 2023-07-26 PROCEDURE — 3044F PR MOST RECENT HEMOGLOBIN A1C LEVEL <7.0%: ICD-10-PCS | Mod: CPTII,,, | Performed by: NURSE PRACTITIONER

## 2023-07-26 RX ORDER — DICLOFENAC SODIUM 75 MG/1
75 TABLET, DELAYED RELEASE ORAL 2 TIMES DAILY PRN
COMMUNITY
Start: 2023-07-21 | End: 2023-10-30 | Stop reason: SDUPTHER

## 2023-07-26 RX ORDER — HYDRALAZINE HYDROCHLORIDE 50 MG/1
50 TABLET, FILM COATED ORAL 2 TIMES DAILY
Qty: 180 TABLET | Refills: 3 | Status: SHIPPED | OUTPATIENT
Start: 2023-07-26 | End: 2024-07-25

## 2023-07-26 NOTE — ASSESSMENT & PLAN NOTE
SBP above goal. Asymptomatic  States out of hydralazine. Rx refilled  Continue Amlodipine, Coreg, and Losartan  Educated on aerobic exercise (3-5 days/week) and a low-fat, low-sodium diet  Avoid excess ETOH consumption. Smoking cessation if applicable  ED precautions (s/s of CVA, etc)

## 2023-07-26 NOTE — PROGRESS NOTES
MARICHUY Leonardo   [facility]  [address][1] [id number]      PATIENT NAME: Fernanda Singh  : 1960  DATE: 23  MRN: [id number]        Reason for Visit / Chief Complaint: Follow-up (Lab review)       History of Present Illness / Problem Focused Workflow     Fernanda Singh presents to the clinic with Follow-up (Lab review)     Initial Visit (3/30/21): 60 y.o. AA female presenting to [facility] to establish primary care.   Previous PCP: Rachel Goff 2020   PmHx: mild CAD, hypertension, GERD, CKD, chronic tobacco use, obesity, breast mass (), uterine CA (2017), H.Pylori (), colon polyps  SHx: left breast lumpectomy (), thyroid bx (2019), tubal, total hysterectomy (), EGD , colonoscopy 2019--hyperplastic polyp   FHx: Esophageal cancer (brother), cirrhosis (brother), Renal failure, heart dz, HF, HTN, HLD   Complaints today: Establish care      Ms. Michael is a pleasant 61 yo female presenting to re-establish primary care. Has not followed up with a PCP since . States previous PCP moved. She is following [facility] here for CAD and CHF (per report). Also following Heme/Onc and GYN clinics for h/o uterine ca diagnosed in . She had a left breast mass removed in . [facility] path was benign. She is UTD on colon cancer screening. H/o colon polyp. Recommended repeat colonoscopy 2024. She has a h/o diabetes diagnosed ~1-2 years ago. Was given Glipizide by last PCP before she moved. No recent A1c on file. H/o goiter with thyroid nodules. [facility] had a thyroid bx 2019 that was negative per report. She needs a refill on Glipizide. No other problems stated.     Health Maintenance:   Colon Ca Screening-colonoscopy 2019--hyperplastic polyp  Breast Ca Screening-Mammo 2020, benign   Bone Density 2020 WNL     (21): Pt presenting to review labs. Since last visit, she was seen in [facility]  "4/14/21. Underwent fundus photo which was negative for DR.     Lab review:      A1c 6.8   LDL 48   TSH WNL   RBCs on UA. Noted intermittently since 2019.      Thyroid US 4/29/21 revealed a multinodular thyroid with at least one thyroid nodule on left side (1.9 cm) meeting the criteria for FNA.      Pt c/o right hand pain and numbness to first three fingers. Concerned about CTS. States remodeling home and some of the tools have been difficult to use. C/o stiffness and inability to close hand into a fist on occasion upon awakening.   Denies fever, chills, cough, chest pain, SOB, abd pain, dysuria, gross hematuria, leg pain, or any other concerns.     (10/15/21): Pt presenting for routine f/u.   Lab review:   HgA1c 5.7%      Renal indices stable   FLP WNL   Hgb 11.9-stable and improved   RBCs on UA 6-10   She underwent CT A/P in June 2/2 [facility]. No overt path on CT other than renal cyst of the lower right kidney. Urine cytology negative. States GYN explained that RBCS in UA likely due to vaginal irritation/inflammation 2/2 radiation tx. No gross hematuria.      C/o lower back pain x 1 mth. Long-standing hx of back pain but worse over past month. No overt injuries/accidents or anything "out of the norm." States pain worse at night, occasionally awakening her from her sleep. Exacerbated by standing > 10 min or strenuous activity over the course of the day. Denies falls, lower ext weakness, B/B incontinence. Relieved with rest and Naprosyn PRN--takes at night. States years since last imaging.      Bp slightly elevated. Asymptomatic. Taking medications as prescribed. F/u with [facility] today.     (5/11/22): Pt presenting for routine f/u. She had labs completed in 4/2022 but missed a f/u to review. She was referred to ENT last April for a multinodular thyroid with one meeting criteria for FNA but didn't make appt. Also states she needs to call to re-schedule appts in GYN, oncologic GYN in [place], and [facility] clinic. She " "had a negative screening mammogram (BI-RAD 2) 1/5/2022. Today, she continues with lower back pain as mentioned at last visit. She was referred to P.T. but ultimately declined appt at the time. Long-standing hx of back pain but worse over past month. No overt injuries/accidents or anything "out of the norm." States pain worse at night, occasionally awakening her from her sleep. Exacerbated by standing > 10 min or strenuous activity over the course of the day. Denies falls, lower ext weakness, B/B incontinence. Relieved with rest and Naprosyn PRN--takes at night. She's also used Flexeril in the past with some relief     (7/14/22): Pt presenting for routine f/u. Bp elevated at last visit; at goal today. Taking medications as prescribed. Previously referred to PT for chronic lower back pain ongoing at least 10 years. Now following [facility] in [place], [place]. Initial eval 5/31/22. Plans were for PT 3x/week x 6 weeks (with certification period ending 7/11/22). Pt states she has only been able to afford PT once a week. States "somewhat" helpful. No saddle anesthesia, B/B incontinence, or falls. Requesting refill on Baclofen. She's seen GYN and ENT since last appt. Stable and doing well. No other concerns.     10/24/22: Patient is presenting for f/u following a brief hospitalization in September and ED visit earlier this month. Patient had a recent hospital admission on September 26, 2022 due to complaints of chest pain and shortness of breath. She was admitted to r/o ACS. She underwent an inpatient Lexiscan stress test which was abnormal. She subsequently underwent a coronary angiogram on September 28, 2022 which revealed normal coronary arteries. She also completed an echocardiogram while inpatient which revealed an ejection fraction of 60%. She presented back to the emergency room on October 13, 2022 with complaints of dizziness and chest pain. She states she was given nitroglycerin via EMS in route to the emergency " "room which relieved her chest pain. She's been seen in [facility] on 10/18/22. Of note, patient's A1c increased to 7.7% (9/27/22) from 6.4% at last visit (at goal with Glipizide), and her glucose has been running in the 200s-300s. She's been started on Metformin 500 mg po BID, in addition to, Glipizide 10 mg po daily. She is also taking Losartan. Previously unable to tolerate ACE-I. Tolerating ARB okay. Admits glucose still "up and down since starting Metformin." States feels Glipizide needs to be changed. At this time, she's asymptomatic. No other concerns.      12/7/2022: Ms. Singh is presenting for routine f/u DM. At last visit, Glipizide was discontinued and patient was started on Farxiga 5 mg po daily, in addition to, Metformin. She is tolerating medication well. Admits urinary frequency but denies dysuria, fever, chills, abd/flank pain. Denies mycotic symptoms. Labs reviewed as below.     She is c/o recurrent lower back pain.     12/05/22 11:04  Sodium: 140  Potassium: 4.1  Chloride: 110 (H)  CO2: 21 (L)  BUN: 13.9  Creatinine: 1.16 (H)  eGFR: 54  Glucose: 146 (H)  Calcium: 9.8  Alkaline Phosphatase: 118  PROTEIN TOTAL: 7.7 (H)  Albumin: 3.7  Albumin/Globulin Ratio: 0.9 (L)  BILIRUBIN TOTAL: 0.4  AST: 11  ALT: 20  Globulin, Total: 4.0 (H)  Hemoglobin A1C External: 6.3  Estimated Avg Glucose: 134.1     4/26/23: Ms. Singh is presenting for routine DM f/u. States Farxiga working well. Average CBGs decreased by approximately 20 pts. Averaging 120s. She is tolerating well. C/w Metformin. She denies overt s/s of hypoglycemia.     She is due for eye exam today.     Previously referred to Dr. Coates in [place] for chronic back pain but relates never received an appt.     Diabetes  She presents for her follow-up diabetic visit. She has type 2 diabetes mellitus. There are no hypoglycemic associated symptoms. There are no diabetic associated symptoms. Pertinent negatives for diabetes include no chest pain. There are " "no hypoglycemic complications. Symptoms are stable. Risk factors for coronary artery disease include diabetes mellitus, dyslipidemia, obesity, hypertension and post-menopausal. Current diabetic treatment includes oral agent (dual therapy). She is compliant with treatment all of the time. Her home blood glucose trend is decreasing steadily. Her breakfast blood glucose range is generally 110-130 mg/dl. An ACE inhibitor/angiotensin II receptor blocker is being taken.     5/22/23: Patient presenting with an acute on chronic exacerbation of lower back pain radiating down both legs, L > R, x 1-2 weeks.   Previously referred to PT for chronic lower back pain ongoing at least 10 years. Now following [facility] in [place], [place]. Initial eval 5/31/22. Plans were for PT 3x/week x 6 weeks (with certification period ending 7/11/22). Pt states she was only been able to afford PT once a week. States was "somewhat" helpful.    Also previously referred to Dr. Coates in [place] for her chronic back pain a few visits ago but never received an appt.     She states Baclofen just makes her sleep. Gabapentin makes her too "groggy" all day. Lidocaine patches dull the pain. Has tried marijuana for pain mgmt with some relief.    7/26/23: Ms. Singh is presenting for diabetic f/u. PmHx: T2DM, mild CAD, hypertension, GERD, CKD, chronic tobacco use, obesity, breast mass (2016), uterine CA (5/2017), H.Pylori (2019), colon polyps, chronic back pain. She completed labs prior to visit. HgA1c 6.1%, FBG 140s, LDL 45, + glucosuria (taking Farxiga). Renal indices stable. No urinary concerns). She is tolerating Farxiga 5 mg po daily and Metformin without any ADR.     She underwent an MRI of L spine 7/19/23 which revealed similar arthritic changes as compared to last exam in 2017. She was referred to [facility] but referral was denied. However, she relates that Dr. Smith's office in [place] reached out to her regarding a past referral. Once she told them " she was getting her MRI done, they were able to schedule her immediately after. She was seen on Friday, July 21st. States underwent additional X-rays there and was told more of her pain is likely coming from an inflamed SI joint and hip. She was started on Diclofenac and plans are for pain injections and returning to P.T. in the future. No B/B incontinence or falls.     No other concerns.     Back Pain  This is a chronic problem. The current episode started more than 1 year ago. The problem occurs daily. The problem has been gradually worsening since onset. The pain is present in the lumbar spine. The quality of the pain is described as shooting and burning. The pain radiates to the left thigh, left knee and left foot. The pain is at a severity of 10/10. The pain is severe. The symptoms are aggravated by bending, sitting, standing and twisting. Associated symptoms include leg pain, paresthesias and tingling. Pertinent negatives include no abdominal pain, bladder incontinence, bowel incontinence, chest pain, dysuria, fever, headaches, numbness, paresis, pelvic pain, perianal numbness, weakness or weight loss. Risk factors include history of cancer, sedentary lifestyle, obesity and menopause. She has tried muscle relaxant (lidocaine patches, gabapentin) for the symptoms. The treatment provided mild relief.   Follow-up  Pertinent negatives include no abdominal pain, chest pain, fever, headaches, numbness or weakness.     Review of Systems     Review of Systems   Constitutional: Negative.  Negative for fever, unexpected weight change and weight loss.   HENT: Negative.     Eyes: Negative.    Respiratory: Negative.  Negative for shortness of breath.    Cardiovascular: Negative.  Negative for chest pain.   Gastrointestinal: Negative.  Negative for abdominal pain and bowel incontinence.   Endocrine: Negative.    Genitourinary: Negative.  Negative for bladder incontinence, dysuria and pelvic pain.   Musculoskeletal:   Positive for back pain.   Skin: Negative.    Allergic/Immunologic: Negative.    Neurological:  Positive for tingling and paresthesias. Negative for dizziness, tremors, seizures, syncope, facial asymmetry, speech difficulty, weakness, light-headedness, numbness and headaches.   Hematological: Negative.    Psychiatric/Behavioral: Negative.       Medical / Social / Family History     Past Medical History:   Diagnosis Date    Cancer 2018    CHF (congestive heart failure) 2012    Diabetes mellitus 2019    Disorder of kidney and ureter 2013    GERD (gastroesophageal reflux disease) 2013    Hypertension 2011    Sleep apnea 2013       Past Surgical History:   Procedure Laterality Date    ANGIOGRAM, CORONARY, WITH LEFT HEART CATHETERIZATION N/A 9/28/2022    Procedure: Angiogram, Coronary, with Left Heart Cath;  Surgeon: Milton Connolly MD;  Location: [facility] CATH LAB;  Service: Cardiology;  Laterality: N/A;    BILATERAL TUBAL LIGATION      Biopsy Gastrointestinal  11/13/2019    BIOP[facility] OF [facility]  07[facility]2019    [id number]    CERVICAL CONIZATION   W/ LASER  05/01/2018    COLONOSCOPY  05/10/2019    COLONOSCOPY  09/24/2014    DILATION AND CURETTAGE OF UTERUS  05/01/2018    ESOPHAGOGASTRODUODENOSCOPY  09/24/2014    ESOPHAGOGASTRODUODENOSCOPY  11/13/2019    Left breast small mass removal      Myomectomy Hysteroscopic  05/01/2018    POLYPECTOMY  05/10/2019    POLYPECTOMY  09/24/2014    TUBAL LIGATION         Social History  Ms.  reports that she has been smoking cigarettes. She has a 7.50 pack-year smoking history. She has been exposed to tobacco smoke. She has never used smokeless tobacco. She reports that she does not currently use alcohol after a past usage of about 1.0 standard drink per week. She reports that she does not currently use drugs.    Family History  Ms.'s family history includes Cirrhosis in her brother; Diabetes in her father; Esophageal cancer in her brother; Heart attack in her father;  "Heart failure in her mother; Hypertension in her brother, father, and mother; Kidney failure in her mother; Leukemia in her brother.    Medications and Allergies     Medications  Current Outpatient Medications   Medication Instructions    amLODIPine (NORVASC) 10 mg, Oral, Daily    aspirin (ECOTRIN) 81 mg, Oral, Daily    baclofen (LIORESAL) 10 mg, Oral, 3 times daily PRN    carvediloL (COREG) 25 mg, Oral, 2 times daily    dapagliflozin propanediol (FARXIGA) 5 mg, Oral, Daily    diclofenac (VOLTAREN) 75 mg, Oral, 2 times daily PRN    dicyclomine (BENTYL) 20 mg, Oral, Every 6 hours PRN    furosemide (LASIX) 40 mg, Oral, Daily    gabapentin (NEURONTIN) 600 mg, Oral, 3 times daily    hydrALAZINE (APRESOLINE) 50 mg, Oral, 2 times daily    LIDOcaine (LIDODERM) 5 % 1 patch, Transdermal, Daily PRN, Remove & Discard patch within 12 hours or as directed by MD    losartan (COZAAR) 25 mg, Oral, Daily    metFORMIN (GLUCOPHAGE) 500 mg, Oral, 2 times daily with meals    nitroGLYCERIN (NITROSTAT) 0.4 mg, Sublingual, Every 5 min PRN    ondansetron (ZOFRAN) 4 mg, Oral, Every 12 hours PRN    rosuvastatin (CRESTOR) 10 mg, Oral, Nightly       Allergies  Review of patient's allergies indicates:   Allergen Reactions    Lisinopril Swelling     Other reaction(s): Angioedema of lips       Physical Examination   Visit Vitals  BP (!) 148/80 (BP Location: Left arm, Patient Position: Sitting, BP Method: Large (Manual))   Pulse (!) 59   Temp 97.5 °F (36.4 °C) (Oral)   Resp 20   Ht 5' 8" (1.727 m)   Wt 130.1 kg (286 lb 12.8 oz)   BMI 43.61 kg/m²       Physical Exam  Constitutional:       Appearance: Normal appearance. She is obese.   HENT:      Head: Normocephalic and atraumatic.      Right Ear: External ear normal.      Left Ear: External ear normal.   Eyes:      Extraocular Movements: Extraocular movements intact.   Cardiovascular:      Rate and Rhythm: Normal rate and regular rhythm.      Pulses: Normal pulses.   Pulmonary:      Effort: " Pulmonary effort is normal.   Musculoskeletal:      Cervical back: Normal range of motion.      Right lower leg: No edema.      Left lower leg: No edema.   Skin:     General: Skin is warm and dry.   Neurological:      General: No focal deficit present.      Mental Status: She is alert and oriented to person, place, and time.   Psychiatric:         Mood and Affect: Mood normal.         Behavior: Behavior normal.         Thought Content: Thought content normal.         Judgment: Judgment normal.         Results     Chemistry:  Lab Results   Component Value Date     07/20/2023    K 3.8 07/20/2023    CHLORIDE 107 07/20/2023    BUN 14.6 07/20/2023    CREATININE 1.02 07/20/2023    EGFRNORACEVR >60 07/20/2023    GLUCOSE 143 (H) 07/20/2023    CALCIUM 9.5 07/20/2023    ALKPHOS 114 07/20/2023    LABPROT 8.0 (H) 07/20/2023    ALBUMIN 3.9 07/20/2023    BILIDIR 0.2 04/13/2022    IBILI 0.20 04/13/2022    AST 11 07/20/2023    ALT 16 07/20/2023    MG 1.60 09/29/2022    PHOS 4.7 09/29/2022        Lab Results   Component Value Date    HGBA1C 6.1 07/20/2023        Hematology:  Lab Results   Component Value Date    WBC 7.38 07/20/2023    HGB 12.7 07/20/2023    HCT 41.4 07/20/2023     07/20/2023       Lipid Panel:  Lab Results   Component Value Date    CHOL 104 07/20/2023    HDL 44 07/20/2023    LDL 45.00 (L) 07/20/2023    TRIG 73 07/20/2023    TOTALCHOLEST 2 07/20/2023        Urine:  Lab Results   Component Value Date    COLORUA Light-Yellow 07/20/2023    APPEARANCEUA Clear 07/20/2023    SGUA 1.028 07/20/2023    PHUA 6.5 07/20/2023    PROTEINUA Negative 07/20/2023    GLUCOSEUA 4+ (A) 07/20/2023    KETONESUA Negative 07/20/2023    BLOODUA Negative 07/20/2023    NITRITESUA Negative 07/20/2023    LEUKOCYTESUR Negative 07/20/2023    RBCUA 0-5 07/20/2023    WBCUA 0-5 07/20/2023    BACTERIA None Seen 07/20/2023    SQEPUA Few (A) 07/20/2023    HYALINECASTS None Seen 07/20/2023    CREATRANDUR 90.7 07/20/2023          Assessment         ICD-10-CM ICD-9-CM   1. Wellness examination  Z00.00 V70.0   2. Primary hypertension  I10 401.9   3. Type 2 diabetes mellitus without complication, without long-term current use of insulin  E11.9 250.00   4. Degenerative disc disease, lumbar  M51.36 722.52   5. Hyperlipidemia LDL goal <70  E78.5 272.4          Plan (including Health Maintenance)     Problem List Items Addressed This Visit          Neuro    Degenerative disc disease, lumbar    Current Assessment & Plan     F/u with Dr. Coates in [place]            Cardiac/Vascular    Primary hypertension    Overview       Currently on Hydralazine 50mg BID, Carvedilol 25mg BID, Amlodipine 10mg every day, Losartan 25 mg po daily         Current Assessment & Plan     SBP above goal. Asymptomatic  States out of hydralazine. Rx refilled  Continue Amlodipine, Coreg, and Losartan  Educated on aerobic exercise (3-5 days/week) and a low-fat, low-sodium diet  Avoid excess ETOH consumption. Smoking cessation if applicable  ED precautions (s/s of CVA, etc)             Relevant Medications    hydrALAZINE (APRESOLINE) 50 MG tablet    Hyperlipidemia LDL goal <70    Current Assessment & Plan     LDL, 45  Continue Crestor 10 mg po daily  Educated on a low-fat, low-cholesterol diet and aerobic exercise (20-30 mins/day x 5 days a week). Encouraged healthy fruits and veggie intake. Increase water intake. Avoid excess ETOH intake and smoking              Endocrine    Diabetes    Overview     Current medications: Farxiga 5 mg po daily, Metformin 500 mg po BID  A1c level: 6.1% (7/2023), 6.3% (12/2022), 7.7% 9/27/22, previously 6.4%, goal <7  CBG trends: < 150  Educated on diabetic diet: Low-fat, Low-carb, Low-cholesterol. Avoid sugary/dark drinks/sodas and white foods (i.e., bread, pasta, potatoes, rice)  Aerobic exercise: 20-30 min/day x 5 days/week  Diabetic Eye Exam: fundus 4/26/23, no DR  Diabetic Foot Exam: 4/26/23, calluses region to right ft, states asymptomatic and  present x years. Monitor closely  Microalbumin-U: WNL 7/20/23  Kidney Protection: ARB  Statin Therapy: Yes, LDL 40s             Relevant Orders    Hemoglobin A1C    Comprehensive Metabolic Panel    Urinalysis, Reflex to Urine Culture       Other    Wellness examination - Primary    Overview     Health Maintenance:  Colon Ca Screening-colonoscopy 5/2019--hyperplastic polyp  Breast Ca Screening-Mammo 1/5/22, benign; Mammogram BI-RADS: 2 Benign, 1/11/23  Bone Density 12/2020 WNL  S/p total hysterectomy 2/2 uterine cancer                  Health Maintenance Due   Topic Date Due    COVID-19 Vaccine (3 - Moderna series) 06/04/2021    Colorectal Cancer Screening  05/21/2023       Future Appointments   Date Time Provider Department Center   8/21/2023  1:00 PM PROVIDERS, [facility]Y [facility]   8/31/2023  1:00 PM Milton Connolly MD [facility]   1/25/2024  8:45 AM [facility] Leon[facility]      For any new or worsening symptoms that are urgent, or for any s/s of MI, CVA, or any other emergent concerns, please visit the ER for further eval. Otherwise, call clinic with questions or concerns.    I spent a total of 35 minutes on the day of the visit.  This includes face to face time and non-face to face time preparing to see the patient (eg, review of tests), obtaining and/or reviewing separately obtained history, documenting clinical information in the electronic or other health record, independently interpreting results and communicating results to the patient/family/caregiver, or care coordinator.      Follow up in about 6 months (around 1/26/2024).    Signature:  MARICHUY Leonardo  [facility]  [address] [facility] [id number]    Date of encounter: 7/26/23

## 2023-07-26 NOTE — ASSESSMENT & PLAN NOTE
LDL, 45  Continue Crestor 10 mg po daily  Educated on a low-fat, low-cholesterol diet and aerobic exercise (20-30 mins/day x 5 days a week). Encouraged healthy fruits and veggie intake. Increase water intake. Avoid excess ETOH intake and smoking

## 2023-07-31 PROBLEM — Z00.00 WELLNESS EXAMINATION: Status: RESOLVED | Noted: 2022-07-13 | Resolved: 2023-07-31

## 2023-08-08 ENCOUNTER — DOCUMENTATION ONLY (OUTPATIENT)
Dept: INTERNAL MEDICINE | Facility: CLINIC | Age: 63
End: 2023-08-08
Payer: MEDICARE

## 2023-08-16 LAB
INR PPP: 0.9
PROTHROMBIN TIME: 12.4 SECONDS (ref 11.4–14)

## 2023-08-21 NOTE — PROGRESS NOTES
CHIEF COMPLAINT:   Chief Complaint   Patient presents with    Follow-up     6 mos f/u denies cardiac targets                                                  HPI:  Fernanda Singh 63 y.o. female  with a PMH significant for mild CAD, hypertension, GERD, CKD, chronic tobacco use, obesity, breast mass (), uterine CA (2017), and hysterectomy who presents to cardiology clinic for follow up and ongoing care. At last appointment she denied any significant complaints.  However she did report reports occasional dizziness when her blood sugar but without was otherwise doing well.    Today the patient denies any cardiac complaints of chest pain, shortness of breath, lightheadedness, dizziness, PND, orthopnea, claudication, syncope.  She states that she is actually feeling very well and had is feeling better than she has in a while.  She states that she is able to complete her ADLs without any issues or ischemic symptoms.  She states that she is able to complete her job duties without any issues as well.  She states that she has not been as active recently due to dealing with chronic lower back pain, however she just started physical therapy again and is looking forward to getting back into a normal exercise routine.  She continues to smoke up to half a pack per day, depending on the day but states that she is trying to quit on her own.                                                                                                                                                                                                                                                                                                                                                                                                                                                                                    CARDIAC TESTIN Hour Holter  Underlying rhythm:   Sinus  Arrythmia:  No significant arrhythmia noted     Pauses:  No  significant pause noted  Symptoms:  No diary returned    Events:  No patient marked events           Echocardiogram  Results for orders placed during the hospital encounter of 09/26/22  Echo  Interpretation Summary  · The left ventricle is normal in size with concentric hypertrophy and normal systolic function. The estimated ejection fraction is 60%.  · Normal right ventricular size with normal right ventricular systolic function.        Stress Test  Results for orders placed during the hospital encounter of 09/26/22  Nuclear Stress - Cardiology Interpreted  Interpretation Summary    Abnormal myocardial perfusion scan.    There is a moderate intensity, small sized, reversible perfusion abnormality that is consistent with ischemia in the distal anteroseptal and apical wall(s) in the typical distribution of the LAD territory.    There are no other significant perfusion abnormalities.    The gated perfusion images showed an ejection fraction of 59% at rest. The gated perfusion images showed an ejection fraction of 50% post stress.    There is normal wall motion at rest and post stress.    LV cavity size is normal at rest and normal at stress.    The EKG portion of this study is positive for ischemia.    The patient reported no chest pain during the stress test.        Coronary Angiogram  Results for orders placed during the hospital encounter of 09/26/22  Cardiac catheterization  Conclusion    The pre-procedure left ventricular end diastolic pressure was 5.    The estimated blood loss was none.    The coronary arteries were normal..  FINDINGS  LVEDP:  5 mmHg  The patient has No CAD  Blood loss:  less than 15 cc.  RECOMMENDATIONS  Medical management  Weight loss.  Blood pressure control.  Statin therapy.  Activity -- avoid straining with affected limbs for one week  Exercise -- as tolerated  Precautions post D/C -- come to ER for hematoma, unusual pain, erythema, or unusual drainage at access site  Precautions post D/C  -- if develop bleeding or hematoma at access site, hold pressure at access site, and come to ER  POST-CATH GUIDELINE FOR INPATIENT DISCHARGE  No hematoma or swelling at access site  No unusual tenderness at access site  Adequate distal pulse or capillary refill in limb(s) accessed  No unusual difficulty with ambulation after bed rest is over        48-hour Holter monitor February 2021:  The patient was in sinus rhythm with an average heart rate of 77 bpm. Heart rates greater than 120 bpm were noted less than 1% of the time. No episodes of bradycardia was noted. Fine less than 1% of beats were due to PVCs. Short burst of V run lasting for 4 beats at 200 bpm.  Occasional PACs. Episode of SVT lasting for 8 beats at 148 bpm.  No diary was returned.    Echo 2/20  Mild concentric left ventricular hypertrophy.Left ventricular ejection fraction is measured at approximately 50 to 55 %.    Holter (48hr)-11/2016 : Patient was in normal sinus rhythm throughout. Supraventricular ectopic beats consisted of very rare premature beats. Ventricular ectopic beats consisted of very rare unifocal beats.    Left heart cath on September 19, 2016:  Mild CAD  Hypertensive disease  Normal LVEF 60%  Recommendations: Medical management and aggressive risk factor management    ECHO (2015)  EF >55%  Patient Active Problem List   Diagnosis    Anxiety    CHF (congestive heart failure)    Degenerative disc disease, lumbar    Endometrial cancer    Gastroesophageal reflux disease    Primary hypertension    Morbid obesity    Peripheral neuropathy due to and not concurrent with chemotherapy    Diabetes    Tobacco user    Multinodular thyroid    Uterine fibroid    Palpitations    Hyperlipidemia LDL goal <70    Mild CAD    Unstable angina    Chronic heart failure with preserved ejection fraction    Foot callus     Past Surgical History:   Procedure Laterality Date    ANGIOGRAM, CORONARY, WITH LEFT HEART CATHETERIZATION N/A 9/28/2022    Procedure:  Angiogram, Coronary, with Left Heart Cath;  Surgeon: Milton Connolly MD;  Location: [facility] CATH LAB;  Service: Cardiology;  Laterality: N/A;    BILATERAL TUBAL LIGATION      Biopsy Gastrointestinal  11/13/2019    BIOPSY OF THYROID  07/2019    Catheter Inserrtion [facility]      [id number]    CERVICAL CONIZATION   W/ LASER  05/01/2018    COLONOSCOPY  05/10/2019    COLONOSCOPY  09/24/2014    DILATION AND CURETTAGE OF UTERUS  05/01/2018    ESOPHAGOGASTRODUODENOSCOPY  09/24/2014    ESOPHAGOGASTRODUODENOSCOPY  11/13/2019    Left breast small mass removal      Myomectomy Hysteroscopic  05/01/2018    POLYPECTOMY  05/10/2019    POLYPECTOMY  09/24/2014    TUBAL LIGATION       Social History     Socioeconomic History    Marital status:      Spouse name: Jude    Number of children: 3    Highest education level: Associate degree: occupational, technical, or vocational program   Occupational History    Occupation: unemployed   Tobacco Use    Smoking status: Every Day     Current packs/day: 0.50     Average packs/day: 0.5 packs/day for 15.0 years (7.5 ttl pk-yrs)     Types: Cigarettes     Passive exposure: Past    Smokeless tobacco: Never   Substance and Sexual Activity    Alcohol use: Not Currently     Alcohol/week: 1.0 standard drink of alcohol     Types: 1 Glasses of wine per week     Comment: monthly    Drug use: Not Currently    Sexual activity: Yes     Partners: Male     Social Determinants of Health     Financial Resource Strain: Low Risk  (12/7/2022)    Overall Financial Resource Strain (CARDIA)     Difficulty of Paying Living Expenses: Not hard at all   Recent Concern: Financial Resource Strain - Medium Risk (9/28/2022)    Overall Financial Resource Strain (CARDIA)     Difficulty of Paying Living Expenses: Somewhat hard   Food Insecurity: No Food Insecurity (12/7/2022)    Hunger Vital Sign     Worried About Running Out of Food in the Last Year: Never true     Ran Out of Food in the Last Year: Never true    Transportation Needs: No Transportation Needs (12/7/2022)    PRAPARE - Transportation     Lack of Transportation (Medical): No     Lack of Transportation (Non-Medical): No   Physical Activity: Inactive (12/7/2022)    Exercise Vital Sign     Days of Exercise per Week: 0 days     Minutes of Exercise per Session: 0 min   Stress: No Stress Concern Present (12/7/2022)    [place] [employer] - Occupational Stress Questionnaire     Feeling of Stress : Not at all   Social Connections: Moderately Isolated (12/7/2022)    Social Connection and Isolation Panel [NHANES]     Frequency of Communication with Friends and Family: More than three times a week     Frequency of Social Gatherings with Friends and Family: Once a week     Attends [employer] Services: More than 4 times per year     Active Member of Clubs or Organizations: No     Attends Club or Organization Meetings: Never     Marital Status:    Housing Stability: High Risk (12/7/2022)    Housing Stability Vital Sign     Unable to Pay for Housing in the Last Year: Yes     Number of Places Lived in the Last Year: 1     Unstable Housing in the Last Year: No        Family History   Problem Relation Age of Onset    Hypertension Mother     Heart failure Mother     Kidney failure Mother     Heart attack Father     Hypertension Father     Diabetes Father     Leukemia Brother     Cirrhosis Brother     Hypertension Brother     Esophageal cancer Brother      Review of patient's allergies indicates:   Allergen Reactions    Lisinopril Swelling     Other reaction(s): Angioedema of lips         ROS:  Review of Systems   Constitutional: Negative.    HENT: Negative.     Eyes: Negative.    Respiratory: Negative.  Negative for shortness of breath.    Cardiovascular: Negative.  Negative for chest pain, palpitations, orthopnea, claudication, leg swelling and PND.   Gastrointestinal: Negative.    Genitourinary: Negative.    Musculoskeletal:  Positive for back pain.  "  Skin: Negative.    Neurological: Negative.  Negative for dizziness and weakness.   Endo/Heme/Allergies: Negative.    Psychiatric/Behavioral: Negative.                                                                                                                                                                                  Negative except as stated in the history of present illness. See HPI for details.    PHYSICAL EXAM:  Visit Vitals  BP (!) 145/81 (BP Location: Right arm, Patient Position: Sitting, BP Method: Medium (Automatic))   Pulse 66   Temp 98.6 °F (37 °C)   Resp 18   Ht 5' 8" (1.727 m)   Wt 129.5 kg (285 lb 7.9 oz)   SpO2 98%   BMI 43.41 kg/m²       Physical Exam  HENT:      Head: Normocephalic.      Nose: Nose normal.   Eyes:      Extraocular Movements: Extraocular movements intact.   Cardiovascular:      Rate and Rhythm: Normal rate and regular rhythm.      Pulses: Normal pulses.      Heart sounds: No murmur heard.  Pulmonary:      Effort: Pulmonary effort is normal. No respiratory distress.   Abdominal:      General: There is no distension.   Musculoskeletal:         General: Normal range of motion.      Cervical back: Normal range of motion.   Skin:     General: Skin is warm.   Neurological:      General: No focal deficit present.      Mental Status: She is alert.   Psychiatric:         Mood and Affect: Mood normal.         Current Outpatient Medications   Medication Instructions    amLODIPine (NORVASC) 10 mg, Oral, Daily    aspirin (ECOTRIN) 81 mg, Oral, Daily    baclofen (LIORESAL) 10 mg, Oral, 3 times daily PRN    carvediloL (COREG) 25 mg, Oral, 2 times daily    dapagliflozin propanediol (FARXIGA) 5 mg, Oral, Daily    diclofenac (VOLTAREN) 75 mg, Oral, 2 times daily PRN    furosemide (LASIX) 40 mg, Oral, Daily    gabapentin (NEURONTIN) 600 mg, Oral, 3 times daily    hydrALAZINE (APRESOLINE) 50 mg, Oral, 2 times daily    LIDOcaine (LIDODERM) 5 % 1 patch, Transdermal, Daily PRN, Remove & Discard " patch within 12 hours or as directed by MD    losartan (COZAAR) 25 mg, Oral, Daily    metFORMIN (GLUCOPHAGE) 500 mg, Oral, 2 times daily with meals    nitroGLYCERIN (NITROSTAT) 0.4 mg, Sublingual, Every 5 min PRN    ondansetron (ZOFRAN) 4 mg, Oral, Every 12 hours PRN    rosuvastatin (CRESTOR) 10 mg, Oral, Nightly        All medications, laboratory studies, cardiac diagnostic imaging reviewed.     Lab Results   Component Value Date    LDL 45.00 (L) 07/20/2023    LDL 56.00 04/13/2022    TRIG 73 07/20/2023    TRIG 72 04/13/2022    CREATININE 1.02 07/20/2023    MG 1.60 09/29/2022    K 3.8 07/20/2023        ASSESSMENT/PLAN:  Mild CAD per [facility] in 2016      - Denies any cardiac complaints today- no chest pain, shortness of breath, or palpitations      - S/p C on 9.28.22 which revealed normal coronary arteries       - Continue aspirin, coreg, and crestor      - Counseled on heart healthy diet, exercise, and smoking cessation    HFpEF      - Denies any shortness of breath or lower extremity edema      - EF 60% per Echo 9.26.22      - Continue Lasix      - Counseled on BP control and diet and exercise     HTN (hypertension)       - BP at goal 135/72 today      - Continue Amlodipine, Coreg, hydralazine, Losartan, and Lasix      - Counseled on low salt diet and exercise as tolerated     Palpitations      - Denies any recent palpitations or related symptoms          - 48 hour Holter monitor February 2021 - revealed she was in sinus rhythm with an average heart rate of 77 bpm. See report under HPI.       - Repeat Holter December 2022 was WNL     HLD      - LDL at goal - 45 per labs 7.20.23      - Continue Crestor 10mg daily       - Counseled on low cholesterol diet and exercise as tolerated     DM      - A1C at goal - 6.1      - Management per PCP    Tobacco use      - Counseled on smoking cessation      - She currently smokes up to 1/2 PPD      Follow up in cardiology clinic in 6 months or sooner if needed   Follow up with PCP  as directed

## 2023-08-23 ENCOUNTER — OFFICE VISIT (OUTPATIENT)
Dept: CARDIOLOGY | Facility: CLINIC | Age: 63
End: 2023-08-23
Payer: MEDICARE

## 2023-08-23 VITALS
BODY MASS INDEX: 43.27 KG/M2 | OXYGEN SATURATION: 98 % | HEART RATE: 66 BPM | RESPIRATION RATE: 18 BRPM | DIASTOLIC BLOOD PRESSURE: 72 MMHG | SYSTOLIC BLOOD PRESSURE: 135 MMHG | WEIGHT: 285.5 LBS | TEMPERATURE: 99 F | HEIGHT: 68 IN

## 2023-08-23 DIAGNOSIS — I25.10 MILD CAD: ICD-10-CM

## 2023-08-23 DIAGNOSIS — R07.89 OTHER CHEST PAIN: ICD-10-CM

## 2023-08-23 DIAGNOSIS — I10 PRIMARY HYPERTENSION: ICD-10-CM

## 2023-08-23 DIAGNOSIS — I50.9 CONGESTIVE HEART FAILURE, UNSPECIFIED HF CHRONICITY, UNSPECIFIED HEART FAILURE TYPE: Primary | ICD-10-CM

## 2023-08-23 DIAGNOSIS — I50.32 CHRONIC HEART FAILURE WITH PRESERVED EJECTION FRACTION: ICD-10-CM

## 2023-08-23 DIAGNOSIS — R00.2 PALPITATIONS: ICD-10-CM

## 2023-08-23 DIAGNOSIS — I20.0 UNSTABLE ANGINA: ICD-10-CM

## 2023-08-23 DIAGNOSIS — E78.5 HYPERLIPIDEMIA LDL GOAL <70: ICD-10-CM

## 2023-08-23 PROCEDURE — 99215 OFFICE O/P EST HI 40 MIN: CPT | Mod: PBBFAC

## 2023-08-23 RX ORDER — LOSARTAN POTASSIUM 25 MG/1
25 TABLET ORAL DAILY
Qty: 90 TABLET | Refills: 1
Start: 2023-08-23 | End: 2023-10-23 | Stop reason: SDUPTHER

## 2023-08-23 RX ORDER — FUROSEMIDE 40 MG/1
40 TABLET ORAL DAILY
Qty: 90 TABLET | Refills: 3 | Status: SHIPPED | OUTPATIENT
Start: 2023-08-23 | End: 2024-08-22

## 2023-08-23 RX ORDER — ROSUVASTATIN CALCIUM 10 MG/1
10 TABLET, COATED ORAL NIGHTLY
Qty: 90 TABLET | Refills: 3 | Status: SHIPPED | OUTPATIENT
Start: 2023-08-23 | End: 2024-08-22

## 2023-08-23 RX ORDER — NITROGLYCERIN 0.4 MG/1
0.4 TABLET SUBLINGUAL EVERY 5 MIN PRN
Qty: 25 TABLET | Refills: 3 | Status: SHIPPED | OUTPATIENT
Start: 2023-08-23 | End: 2024-08-22

## 2023-08-23 RX ORDER — AMLODIPINE BESYLATE 10 MG/1
10 TABLET ORAL DAILY
Qty: 90 TABLET | Refills: 3 | Status: SHIPPED | OUTPATIENT
Start: 2023-08-23 | End: 2024-08-22

## 2023-08-23 RX ORDER — CARVEDILOL 25 MG/1
25 TABLET ORAL 2 TIMES DAILY
Qty: 180 TABLET | Refills: 3 | Status: SHIPPED | OUTPATIENT
Start: 2023-08-23 | End: 2024-08-22

## 2023-08-25 DIAGNOSIS — E78.5 HYPERLIPIDEMIA LDL GOAL <70: ICD-10-CM

## 2023-08-25 DIAGNOSIS — I10 PRIMARY HYPERTENSION: ICD-10-CM

## 2023-08-25 DIAGNOSIS — I25.10 MILD CAD: ICD-10-CM

## 2023-08-25 RX ORDER — LOSARTAN POTASSIUM 25 MG/1
25 TABLET ORAL
Qty: 90 TABLET | Refills: 1 | OUTPATIENT
Start: 2023-08-25

## 2023-08-25 RX ORDER — CARVEDILOL 25 MG/1
25 TABLET ORAL 2 TIMES DAILY
Qty: 180 TABLET | Refills: 1 | OUTPATIENT
Start: 2023-08-25

## 2023-08-25 RX ORDER — FUROSEMIDE 40 MG/1
40 TABLET ORAL
Qty: 90 TABLET | Refills: 1 | OUTPATIENT
Start: 2023-08-25

## 2023-08-25 RX ORDER — ROSUVASTATIN CALCIUM 10 MG/1
10 TABLET, COATED ORAL NIGHTLY
Qty: 90 TABLET | Refills: 1 | OUTPATIENT
Start: 2023-08-25

## 2023-08-25 RX ORDER — AMLODIPINE BESYLATE 10 MG/1
10 TABLET ORAL
Qty: 90 TABLET | Refills: 1 | OUTPATIENT
Start: 2023-08-25

## 2023-10-16 ENCOUNTER — OFFICE VISIT (OUTPATIENT)
Dept: OPHTHALMOLOGY | Facility: CLINIC | Age: 63
End: 2023-10-16
Payer: MEDICARE

## 2023-10-16 VITALS — HEIGHT: 68 IN | WEIGHT: 285.5 LBS | BODY MASS INDEX: 43.27 KG/M2

## 2023-10-16 DIAGNOSIS — H53.453 OTHER LOCALIZED VISUAL FIELD DEFECT, BILATERAL: ICD-10-CM

## 2023-10-16 DIAGNOSIS — H53.8 BLURRED VISION, BILATERAL: ICD-10-CM

## 2023-10-16 DIAGNOSIS — E11.9 TYPE 2 DIABETES MELLITUS WITHOUT RETINOPATHY: ICD-10-CM

## 2023-10-16 DIAGNOSIS — G43.109 MIGRAINE WITH AURA AND WITHOUT STATUS MIGRAINOSUS, NOT INTRACTABLE: Primary | ICD-10-CM

## 2023-10-16 PROCEDURE — 99213 OFFICE O/P EST LOW 20 MIN: CPT | Mod: PBBFAC,PN

## 2023-10-16 PROCEDURE — 92134 CPTRZ OPH DX IMG PST SGM RTA: CPT | Mod: PBBFAC,PN | Performed by: OPHTHALMOLOGY

## 2023-10-16 PROCEDURE — 92133 CPTRZD OPH DX IMG PST SGM ON: CPT | Mod: PBBFAC,PN | Performed by: OPHTHALMOLOGY

## 2023-10-16 PROCEDURE — 92134 CPTRZ OPH DX IMG PST SGM RTA: CPT | Mod: PBBFAC,PN

## 2023-10-22 ENCOUNTER — PATIENT MESSAGE (OUTPATIENT)
Dept: CARDIOLOGY | Facility: CLINIC | Age: 63
End: 2023-10-22
Payer: MEDICARE

## 2023-10-23 DIAGNOSIS — I10 PRIMARY HYPERTENSION: ICD-10-CM

## 2023-10-23 RX ORDER — LOSARTAN POTASSIUM 25 MG/1
25 TABLET ORAL DAILY
Qty: 90 TABLET | Refills: 3 | Status: SHIPPED | OUTPATIENT
Start: 2023-10-23 | End: 2024-10-22

## 2023-10-23 NOTE — TELEPHONE ENCOUNTER
Pt requesting losartan prescription. It was not eprescribed at last visit on 8/23/23. NCV 2/23/24.

## 2023-10-29 ENCOUNTER — PATIENT MESSAGE (OUTPATIENT)
Dept: INTERNAL MEDICINE | Facility: CLINIC | Age: 63
End: 2023-10-29
Payer: MEDICARE

## 2023-10-31 RX ORDER — DICLOFENAC SODIUM 75 MG/1
75 TABLET, DELAYED RELEASE ORAL 2 TIMES DAILY PRN
Qty: 60 TABLET | Refills: 0 | Status: SHIPPED | OUTPATIENT
Start: 2023-10-31 | End: 2023-12-08 | Stop reason: SDUPTHER

## 2023-11-16 ENCOUNTER — PATIENT OUTREACH (OUTPATIENT)
Dept: INTERNAL MEDICINE | Facility: CLINIC | Age: 63
End: 2023-11-16
Payer: MEDICARE

## 2023-11-16 ENCOUNTER — PATIENT MESSAGE (OUTPATIENT)
Dept: ADMINISTRATIVE | Facility: HOSPITAL | Age: 63
End: 2023-11-16
Payer: MEDICARE

## 2023-11-16 ENCOUNTER — PATIENT OUTREACH (OUTPATIENT)
Dept: ADMINISTRATIVE | Facility: HOSPITAL | Age: 63
End: 2023-11-16
Payer: MEDICARE

## 2023-11-16 DIAGNOSIS — Z12.31 ENCOUNTER FOR SCREENING MAMMOGRAM FOR MALIGNANT NEOPLASM OF BREAST: Primary | ICD-10-CM

## 2023-11-16 NOTE — PROGRESS NOTES
[employer] response received from pt. Pt due/requesting order/referral for mammogram due in January 2024. Order placed per Written Order Guidelines. Pending review by pcp. Pt notified via portal message.

## 2023-11-16 NOTE — PROGRESS NOTES

## 2023-11-20 ENCOUNTER — HOSPITAL ENCOUNTER (OUTPATIENT)
Dept: RADIOLOGY | Facility: HOSPITAL | Age: 63
Discharge: HOME | End: 2023-11-20
Attending: NURSE PRACTITIONER
Payer: MEDICARE

## 2023-11-20 DIAGNOSIS — Z78.0 ASYMPTOMATIC POSTMENOPAUSAL STATE: ICD-10-CM

## 2023-11-20 PROCEDURE — 77080 DXA BONE DENSITY AXIAL: CPT | Mod: TC

## 2023-11-21 ENCOUNTER — TELEPHONE (OUTPATIENT)
Dept: INTERNAL MEDICINE | Facility: CLINIC | Age: 63
End: 2023-11-21
Payer: MEDICARE

## 2023-11-21 NOTE — TELEPHONE ENCOUNTER
----- Message from MARICHUY Leonardo sent at 11/20/2023  5:01 PM CST -----  Please inform patient that bone density scan was normal.

## 2023-11-21 NOTE — TELEPHONE ENCOUNTER
Pt notified of normal bone density results and verbalized understanding. Instructed pt to keep any scheduled follow up appointments and call back with any questions or concerns.

## 2023-11-27 ENCOUNTER — PATIENT MESSAGE (OUTPATIENT)
Dept: INTERNAL MEDICINE | Facility: CLINIC | Age: 63
End: 2023-11-27
Payer: MEDICARE

## 2023-11-27 DIAGNOSIS — E11.9 TYPE 2 DIABETES MELLITUS WITHOUT COMPLICATION, WITHOUT LONG-TERM CURRENT USE OF INSULIN: ICD-10-CM

## 2023-11-27 RX ORDER — METFORMIN HYDROCHLORIDE 500 MG/1
500 TABLET ORAL 2 TIMES DAILY WITH MEALS
Qty: 180 TABLET | Refills: 0 | Status: SHIPPED | OUTPATIENT
Start: 2023-11-27 | End: 2024-04-02

## 2023-12-05 DIAGNOSIS — E11.9 TYPE 2 DIABETES MELLITUS WITHOUT COMPLICATION, WITHOUT LONG-TERM CURRENT USE OF INSULIN: ICD-10-CM

## 2023-12-08 RX ORDER — DICLOFENAC SODIUM 75 MG/1
75 TABLET, DELAYED RELEASE ORAL 2 TIMES DAILY PRN
Qty: 60 TABLET | Refills: 0 | Status: SHIPPED | OUTPATIENT
Start: 2023-12-08

## 2023-12-08 RX ORDER — DAPAGLIFLOZIN 5 MG/1
5 TABLET, FILM COATED ORAL
Qty: 90 TABLET | Refills: 1 | Status: SHIPPED | OUTPATIENT
Start: 2023-12-08

## 2023-12-11 ENCOUNTER — PATIENT MESSAGE (OUTPATIENT)
Dept: INTERNAL MEDICINE | Facility: CLINIC | Age: 63
End: 2023-12-11
Payer: MEDICARE

## 2023-12-11 DIAGNOSIS — E11.9 TYPE 2 DIABETES MELLITUS WITHOUT COMPLICATION, WITHOUT LONG-TERM CURRENT USE OF INSULIN: ICD-10-CM

## 2023-12-11 RX ORDER — DAPAGLIFLOZIN 5 MG/1
5 TABLET, FILM COATED ORAL
Qty: 90 TABLET | Refills: 1 | OUTPATIENT
Start: 2023-12-11

## 2023-12-11 RX ORDER — METFORMIN HYDROCHLORIDE 500 MG/1
500 TABLET ORAL 2 TIMES DAILY WITH MEALS
Qty: 180 TABLET | Refills: 0 | OUTPATIENT
Start: 2023-12-11 | End: 2024-03-10

## 2024-01-12 ENCOUNTER — HOSPITAL ENCOUNTER (OUTPATIENT)
Dept: RADIOLOGY | Facility: HOSPITAL | Age: 64
Discharge: HOME | End: 2024-01-12
Attending: NURSE PRACTITIONER
Payer: MEDICARE

## 2024-01-12 DIAGNOSIS — Z12.31 ENCOUNTER FOR SCREENING MAMMOGRAM FOR MALIGNANT NEOPLASM OF BREAST: ICD-10-CM

## 2024-01-12 PROCEDURE — 77067 SCR MAMMO BI INCL CAD: CPT | Mod: TC

## 2024-01-12 PROCEDURE — 77063 BREAST TOMOSYNTHESIS BI: CPT | Mod: 26,,, | Performed by: RADIOLOGY

## 2024-01-12 PROCEDURE — 77067 SCR MAMMO BI INCL CAD: CPT | Mod: 26,,, | Performed by: RADIOLOGY

## 2024-01-23 ENCOUNTER — LAB VISIT (OUTPATIENT)
Dept: LAB | Facility: HOSPITAL | Age: 64
End: 2024-01-23
Attending: NURSE PRACTITIONER
Payer: MEDICARE

## 2024-01-23 DIAGNOSIS — E11.9 TYPE 2 DIABETES MELLITUS WITHOUT COMPLICATION, WITHOUT LONG-TERM CURRENT USE OF INSULIN: ICD-10-CM

## 2024-01-23 LAB
ALBUMIN SERPL-MCNC: 3.7 G/DL (ref 3.4–4.8)
ALBUMIN/GLOB SERPL: 0.9 RATIO (ref 1.1–2)
ALP SERPL-CCNC: 111 UNIT/L (ref 40–150)
ALT SERPL-CCNC: 14 UNIT/L (ref 0–55)
AST SERPL-CCNC: 11 UNIT/L (ref 5–34)
BILIRUB SERPL-MCNC: 0.3 MG/DL
BUN SERPL-MCNC: 13 MG/DL (ref 9.8–20.1)
CALCIUM SERPL-MCNC: 9.3 MG/DL (ref 8.4–10.2)
CHLORIDE SERPL-SCNC: 108 MMOL/L (ref 98–107)
CO2 SERPL-SCNC: 22 MMOL/L (ref 23–31)
CREAT SERPL-MCNC: 0.93 MG/DL (ref 0.55–1.02)
EST. AVERAGE GLUCOSE BLD GHB EST-MCNC: 119.8 MG/DL
GFR SERPLBLD CREATININE-BSD FMLA CKD-EPI: >60 MLS/MIN/1.73/M2
GLOBULIN SER-MCNC: 3.9 GM/DL (ref 2.4–3.5)
GLUCOSE SERPL-MCNC: 142 MG/DL (ref 82–115)
HBA1C MFR BLD: 5.8 %
POTASSIUM SERPL-SCNC: 4.4 MMOL/L (ref 3.5–5.1)
PROT SERPL-MCNC: 7.6 GM/DL (ref 5.8–7.6)
SODIUM SERPL-SCNC: 142 MMOL/L (ref 136–145)

## 2024-01-23 PROCEDURE — 83036 HEMOGLOBIN GLYCOSYLATED A1C: CPT

## 2024-01-23 PROCEDURE — 36415 COLL VENOUS BLD VENIPUNCTURE: CPT

## 2024-01-23 PROCEDURE — 80053 COMPREHEN METABOLIC PANEL: CPT

## 2024-02-16 NOTE — PROGRESS NOTES
"CHIEF COMPLAINT:   No chief complaint on file.                                                 HPI:  Fernanda Singh 63 y.o. female  with a PMH significant for mild CAD, hypertension, GERD, CKD, chronic tobacco use, obesity, breast mass (2016), uterine CA (2017), and hysterectomy who presents to cardiology clinic for follow up and ongoing care at last office visit patient denied any significant cardiac complaints and stated that she was feeling well overall.  Her main complaint at that time was chronic low back pain.    Today the patient states that she is feeling "wonderful.  She states that she has had 2 episodes of chest pain a few months ago that were resolved with nitroglycerin.  Otherwise, she denies any further episodes of chest pain, SOB, FERNANDES, palpitations, PND, orthopnea, lightheadedness, dizziness, syncope, peripheral edema, cla she tries udication symptoms.  She states that she feels much better than previously.  She was able to complete her ADLs and job duties without any issues or ischemic symptoms.  She tries to maintain a good level of activity her daily life.  She states that she is trying to incorporate more exercise in her daily life.  She continues to smoke a half pack of cigarettes per day but is trying to decrease on her own.    Of note, patient complains of abdominal pain.  She states that she believes she has a hernia that has been growing over the last several months.  She states that she feels it causing her "GI discomfort" in general abdominal aches and pains.  Denies any severe pain.  I advised her to either reach out to PCP or go to urgent care/ED for further workup and evaluation.                                                                                                                                                                                                                                                                             CARDIAC TESTIN Hour Holter " 11.29.22  Underlying rhythm:   Sinus  Arrythmia:  No significant arrhythmia noted     Pauses:  No significant pause noted  Symptoms:  No diary returned    Events:  No patient marked events           Echocardiogram  Results for orders placed during the hospital encounter of 09/26/22  Echo  Interpretation Summary  · The left ventricle is normal in size with concentric hypertrophy and normal systolic function. The estimated ejection fraction is 60%.  · Normal right ventricular size with normal right ventricular systolic function.        Stress Test  Results for orders placed during the hospital encounter of 09/26/22  Nuclear Stress - Cardiology Interpreted  Interpretation Summary    Abnormal myocardial perfusion scan.    There is a moderate intensity, small sized, reversible perfusion abnormality that is consistent with ischemia in the distal anteroseptal and apical wall(s) in the typical distribution of the LAD territory.    There are no other significant perfusion abnormalities.    The gated perfusion images showed an ejection fraction of 59% at rest. The gated perfusion images showed an ejection fraction of 50% post stress.    There is normal wall motion at rest and post stress.    LV cavity size is normal at rest and normal at stress.    The EKG portion of this study is positive for ischemia.    The patient reported no chest pain during the stress test.        Coronary Angiogram  Results for orders placed during the hospital encounter of 09/26/22  Cardiac catheterization  Conclusion    The pre-procedure left ventricular end diastolic pressure was 5.    The estimated blood loss was none.    The coronary arteries were normal..  FINDINGS  LVEDP:  5 mmHg  The patient has No CAD  Blood loss:  less than 15 cc.  RECOMMENDATIONS  Medical management  Weight loss.  Blood pressure control.  Statin therapy.  Activity -- avoid straining with affected limbs for one week  Exercise -- as tolerated  Precautions post D/C -- come to  ER for hematoma, unusual pain, erythema, or unusual drainage at access site  Precautions post D/C -- if develop bleeding or hematoma at access site, hold pressure at access site, and come to ER  POST-CATH GUIDELINE FOR INPATIENT DISCHARGE  No hematoma or swelling at access site  No unusual tenderness at access site  Adequate distal pulse or capillary refill in limb(s) accessed  No unusual difficulty with ambulation after bed rest is over        48-hour Holter monitor February 2021:  The patient was in sinus rhythm with an average heart rate of 77 bpm. Heart rates greater than 120 bpm were noted less than 1% of the time. No episodes of bradycardia was noted. Fine less than 1% of beats were due to PVCs. Short burst of V run lasting for 4 beats at 200 bpm.  Occasional PACs. Episode of SVT lasting for 8 beats at 148 bpm.  No diary was returned.    Echo 2/20  Mild concentric left ventricular hypertrophy.Left ventricular ejection fraction is measured at approximately 50 to 55 %.    Holter (48hr)-11/2016 : Patient was in normal sinus rhythm throughout. Supraventricular ectopic beats consisted of very rare premature beats. Ventricular ectopic beats consisted of very rare unifocal beats.    Left heart cath on September 19, 2016:  Mild CAD  Hypertensive disease  Normal LVEF 60%  Recommendations: Medical management and aggressive risk factor management    ECHO (2015)  EF >55%  Patient Active Problem List   Diagnosis    Anxiety    CHF (congestive heart failure)    Degenerative disc disease, lumbar    Endometrial cancer    Gastroesophageal reflux disease    Primary hypertension    Morbid obesity    Peripheral neuropathy due to and not concurrent with chemotherapy    Diabetes    Tobacco user    Multinodular thyroid    Uterine fibroid    Palpitations    Hyperlipidemia LDL goal <70    Mild CAD    Unstable angina    Chronic heart failure with preserved ejection fraction    Foot callus     Past Surgical History:   Procedure  Laterality Date    ANGIOGRAM, CORONARY, WITH LEFT HEART CATHETERIZATION N/A 9/28/2022    Procedure: Angiogram, Coronary, with Left Heart Cath;  Surgeon: Milton Connolly MD;  Location: [facility] CATH LAB;  Service: Cardiology;  Laterality: N/A;    BILATERAL TUBAL LIGATION      Biopsy Gastrointestinal  11/13/2019    BIOPSY OF THYROID  07/2019    Catheter Inserrtion Mediport      [id number]    CERVICAL CONIZATION   W/ LASER  05/01/2018    COLONOSCOPY  05/10/2019    COLONOSCOPY  09/24/2014    DILATION AND CURETTAGE OF UTERUS  05/01/2018    ESOPHAGOGASTRODUODENOSCOPY  09/24/2014    ESOPHAGOGASTRODUODENOSCOPY  11/13/2019    Left breast small mass removal      Myomectomy Hysteroscopic  05/01/2018    POLYPECTOMY  05/10/2019    POLYPECTOMY  09/24/2014    TUBAL LIGATION       Social History     Socioeconomic History    Marital status:      Spouse name: Jude    Number of children: 3    Highest education level: Associate degree: occupational, technical, or vocational program   Occupational History    Occupation: unemployed   Tobacco Use    Smoking status: Every Day     Current packs/day: 0.50     Average packs/day: 0.5 packs/day for 29.6 years (14.8 ttl pk-yrs)     Types: Cigarettes     Start date: 7/12/1994     Passive exposure: Past    Smokeless tobacco: Never   Substance and Sexual Activity    Alcohol use: Not Currently     Alcohol/week: 1.0 standard drink of alcohol     Types: 1 Glasses of wine per week     Comment: monthly    Drug use: Not Currently    Sexual activity: Yes     Partners: Male     Social Determinants of Health     Financial Resource Strain: Low Risk  (12/7/2022)    Overall Financial Resource Strain (CARDIA)     Difficulty of Paying Living Expenses: Not hard at all   Recent Concern: Financial Resource Strain - Medium Risk (9/28/2022)    Overall Financial Resource Strain (CARDIA)     Difficulty of Paying Living Expenses: Somewhat hard   Food Insecurity: No Food Insecurity (12/7/2022)    Hunger Vital Sign      Worried About Running Out of Food in the Last Year: Never true     Ran Out of Food in the Last Year: Never true   Transportation Needs: No Transportation Needs (12/7/2022)    PRAPARE - Transportation     Lack of Transportation (Medical): No     Lack of Transportation (Non-Medical): No   Physical Activity: Inactive (12/7/2022)    Exercise Vital Sign     Days of Exercise per Week: 0 days     Minutes of Exercise per Session: 0 min   Stress: No Stress Concern Present (12/7/2022)    Citizen of [place] [employer] - Occupational Stress Questionnaire     Feeling of Stress : Not at all   Social Connections: Moderately Isolated (12/7/2022)    Social Connection and Isolation Panel [NHANES]     Frequency of Communication with Friends and Family: More than three times a week     Frequency of Social Gatherings with Friends and Family: Once a week     Attends Confucianism Services: More than 4 times per year     Active Member of Clubs or Organizations: No     Attends Club or Organization Meetings: Never     Marital Status:    Housing Stability: High Risk (12/7/2022)    Housing Stability Vital Sign     Unable to Pay for Housing in the Last Year: Yes     Number of Places Lived in the Last Year: 1     Unstable Housing in the Last Year: No        Family History   Problem Relation Age of Onset    Hypertension Mother     Heart failure Mother     Kidney failure Mother     Heart attack Father     Hypertension Father     Diabetes Father     Leukemia Brother     Cirrhosis Brother     Hypertension Brother     Esophageal cancer Brother      Review of patient's allergies indicates:   Allergen Reactions    Lisinopril Swelling     Other reaction(s): Angioedema of lips         ROS:  Review of Systems   Constitutional: Negative.    HENT: Negative.     Eyes: Negative.    Respiratory: Negative.  Negative for shortness of breath.    Cardiovascular: Negative.  Negative for chest pain, palpitations, orthopnea, claudication, leg swelling  and PND.   Gastrointestinal: Negative.    Genitourinary: Negative.    Musculoskeletal:  Positive for back pain.   Skin: Negative.    Neurological: Negative.  Negative for dizziness and weakness.   Endo/Heme/Allergies: Negative.    Psychiatric/Behavioral: Negative.                                                                                                                                                                                  Negative except as stated in the history of present illness. See HPI for details.    PHYSICAL EXAM:  There were no vitals taken for this visit.      Physical Exam  HENT:      Head: Normocephalic.      Nose: Nose normal.   Eyes:      Extraocular Movements: Extraocular movements intact.   Cardiovascular:      Rate and Rhythm: Normal rate and regular rhythm.      Pulses: Normal pulses.      Heart sounds: No murmur heard.  Pulmonary:      Effort: Pulmonary effort is normal. No respiratory distress.   Abdominal:      General: There is no distension.   Musculoskeletal:         General: Normal range of motion.      Cervical back: Normal range of motion.      Right lower leg: No edema.      Left lower leg: No edema.   Skin:     General: Skin is warm.   Neurological:      General: No focal deficit present.      Mental Status: She is alert.   Psychiatric:         Mood and Affect: Mood normal.         Current Outpatient Medications   Medication Instructions    amLODIPine (NORVASC) 10 mg, Oral, Daily    aspirin (ECOTRIN) 81 mg, Oral, Daily    baclofen (LIORESAL) 10 mg, Oral, 3 times daily PRN    carvediloL (COREG) 25 mg, Oral, 2 times daily    diclofenac (VOLTAREN) 75 mg, Oral, 2 times daily PRN    FARXIGA 5 mg, Oral    furosemide (LASIX) 40 mg, Oral, Daily    gabapentin (NEURONTIN) 600 mg, Oral, 3 times daily    hydrALAZINE (APRESOLINE) 50 mg, Oral, 2 times daily    LIDOcaine (LIDODERM) 5 % 1 patch, Transdermal, Daily PRN, Remove & Discard patch within 12 hours or as directed by MD    losartan  (COZAAR) 25 mg, Oral, Daily    metFORMIN (GLUCOPHAGE) 500 mg, Oral, 2 times daily with meals    nitroGLYCERIN (NITROSTAT) 0.4 mg, Sublingual, Every 5 min PRN    ondansetron (ZOFRAN) 4 mg, Oral, Every 12 hours PRN    rosuvastatin (CRESTOR) 10 mg, Oral, Nightly        All medications, laboratory studies, cardiac diagnostic imaging reviewed.     Lab Results   Component Value Date    LDL 45.00 (L) 07/20/2023    LDL 56.00 04/13/2022    TRIG 73 07/20/2023    TRIG 72 04/13/2022    CREATININE 0.93 01/23/2024    MG 1.60 09/29/2022    K 4.4 01/23/2024        ASSESSMENT/PLAN:  Mild CAD per [facility] in 2016      - Denies any cardiac complaints today- stable from last office visit- no chest pain, shortness of breath, or palpitations      - S/p C on 9.28.22 which revealed normal coronary arteries       - Continue aspirin, coreg, and crestor      - Counseled on heart healthy diet, exercise, and smoking cessation    HFpEF      - Denies any heart failure symptoms such as shortness of breath or lower extremity edema or other congestive symptoms      - EF 60% per Echo 9.26.22      - Continue Lasix      - Counseled on BP control and diet and exercise     HTN (hypertension)       - BP at goal 137/74      - Continue Amlodipine, Coreg, hydralazine, Losartan, and Lasix      - Counseled on low salt diet and exercise as tolerated     Palpitations      - Asymptomatic-Denies any recent palpitations, lightheadedness, dizziness, fluttering, or tachycardic symptoms      - 48 hour Holter monitor February 2021 - revealed she was in sinus rhythm with an average heart rate of 77 bpm. See report under HPI.       - Repeat Holter December 2022 was WNL     HLD      - LDL at goal - 45 per labs 7.20.23      - Continue Crestor 10mg daily       - Repeat FLP/CMP prior next office visit      - Counseled on low cholesterol diet and exercise as tolerated     DM      - A1C at goal - 6.1      - Management per PCP    Tobacco use      - Counseled on smoking cessation       - She currently smokes up to 1/2 PPD        Complete FLP/CMP in approximately July 2024   Follow up in cardiology clinic in 6 months or sooner if needed  Follow up with PCP as directed

## 2024-02-23 ENCOUNTER — OFFICE VISIT (OUTPATIENT)
Dept: CARDIOLOGY | Facility: CLINIC | Age: 64
End: 2024-02-23
Payer: MEDICARE

## 2024-02-23 VITALS
SYSTOLIC BLOOD PRESSURE: 137 MMHG | HEART RATE: 74 BPM | HEIGHT: 68 IN | DIASTOLIC BLOOD PRESSURE: 74 MMHG | TEMPERATURE: 99 F | OXYGEN SATURATION: 100 % | WEIGHT: 271.75 LBS | BODY MASS INDEX: 41.18 KG/M2 | RESPIRATION RATE: 18 BRPM

## 2024-02-23 DIAGNOSIS — I50.9 CONGESTIVE HEART FAILURE, UNSPECIFIED HF CHRONICITY, UNSPECIFIED HEART FAILURE TYPE: ICD-10-CM

## 2024-02-23 DIAGNOSIS — I50.32 CHRONIC HEART FAILURE WITH PRESERVED EJECTION FRACTION: ICD-10-CM

## 2024-02-23 DIAGNOSIS — I10 PRIMARY HYPERTENSION: ICD-10-CM

## 2024-02-23 DIAGNOSIS — E78.5 HYPERLIPIDEMIA LDL GOAL <70: ICD-10-CM

## 2024-02-23 DIAGNOSIS — Z72.0 TOBACCO USER: ICD-10-CM

## 2024-02-23 DIAGNOSIS — I25.10 MILD CAD: ICD-10-CM

## 2024-02-23 DIAGNOSIS — I20.0 UNSTABLE ANGINA: Primary | ICD-10-CM

## 2024-02-23 DIAGNOSIS — R00.2 PALPITATIONS: ICD-10-CM

## 2024-02-23 PROCEDURE — 99215 OFFICE O/P EST HI 40 MIN: CPT | Mod: PBBFAC

## 2024-02-23 PROCEDURE — 3075F SYST BP GE 130 - 139MM HG: CPT | Mod: CPTII,,,

## 2024-02-23 PROCEDURE — 3072F LOW RISK FOR RETINOPATHY: CPT | Mod: CPTII,,,

## 2024-02-23 PROCEDURE — 4010F ACE/ARB THERAPY RXD/TAKEN: CPT | Mod: CPTII,,,

## 2024-02-23 PROCEDURE — 3078F DIAST BP <80 MM HG: CPT | Mod: CPTII,,,

## 2024-02-23 PROCEDURE — 1160F RVW MEDS BY RX/DR IN RCRD: CPT | Mod: CPTII,,,

## 2024-02-23 PROCEDURE — 1159F MED LIST DOCD IN RCRD: CPT | Mod: CPTII,,,

## 2024-02-23 PROCEDURE — 3008F BODY MASS INDEX DOCD: CPT | Mod: CPTII,,,

## 2024-02-23 PROCEDURE — 3044F HG A1C LEVEL LT 7.0%: CPT | Mod: CPTII,,,

## 2024-02-23 PROCEDURE — 99214 OFFICE O/P EST MOD 30 MIN: CPT | Mod: S$PBB,,,

## 2024-02-23 NOTE — PATIENT INSTRUCTIONS
Complete FLP/CMP in approximately July 2024   Follow up in cardiology clinic in 6 months or sooner if needed  Follow up with PCP as directed

## 2024-04-01 DIAGNOSIS — E11.9 TYPE 2 DIABETES MELLITUS WITHOUT COMPLICATION, WITHOUT LONG-TERM CURRENT USE OF INSULIN: ICD-10-CM

## 2024-04-02 RX ORDER — METFORMIN HYDROCHLORIDE 500 MG/1
500 TABLET ORAL 2 TIMES DAILY WITH MEALS
Qty: 180 TABLET | Refills: 1 | Status: SHIPPED | OUTPATIENT
Start: 2024-04-02 | End: 2024-09-29

## 2024-05-22 ENCOUNTER — OFFICE VISIT (OUTPATIENT)
Dept: INTERNAL MEDICINE | Facility: CLINIC | Age: 64
End: 2024-05-22
Payer: MEDICARE

## 2024-05-22 ENCOUNTER — LAB VISIT (OUTPATIENT)
Dept: LAB | Facility: HOSPITAL | Age: 64
End: 2024-05-22
Attending: NURSE PRACTITIONER
Payer: MEDICARE

## 2024-05-22 VITALS
SYSTOLIC BLOOD PRESSURE: 119 MMHG | WEIGHT: 277.81 LBS | DIASTOLIC BLOOD PRESSURE: 73 MMHG | BODY MASS INDEX: 42.1 KG/M2 | RESPIRATION RATE: 20 BRPM | HEIGHT: 68 IN | TEMPERATURE: 99 F | HEART RATE: 60 BPM

## 2024-05-22 DIAGNOSIS — I25.10 MILD CAD: ICD-10-CM

## 2024-05-22 DIAGNOSIS — E78.5 HYPERLIPIDEMIA LDL GOAL <70: ICD-10-CM

## 2024-05-22 DIAGNOSIS — E04.2 MULTINODULAR THYROID: ICD-10-CM

## 2024-05-22 DIAGNOSIS — R19.09 CENTRAL ABDOMINAL MASS: ICD-10-CM

## 2024-05-22 DIAGNOSIS — E11.9 TYPE 2 DIABETES MELLITUS WITHOUT COMPLICATION, WITHOUT LONG-TERM CURRENT USE OF INSULIN: ICD-10-CM

## 2024-05-22 DIAGNOSIS — E66.01 MORBID OBESITY: ICD-10-CM

## 2024-05-22 DIAGNOSIS — I10 PRIMARY HYPERTENSION: Primary | ICD-10-CM

## 2024-05-22 LAB
ALBUMIN SERPL-MCNC: 3.8 G/DL (ref 3.4–4.8)
ALBUMIN/GLOB SERPL: 0.9 RATIO (ref 1.1–2)
ALP SERPL-CCNC: 110 UNIT/L (ref 40–150)
ALT SERPL-CCNC: 14 UNIT/L (ref 0–55)
ANION GAP SERPL CALC-SCNC: 9 MEQ/L
AST SERPL-CCNC: 12 UNIT/L (ref 5–34)
BILIRUB SERPL-MCNC: 0.4 MG/DL
BUN SERPL-MCNC: 15.9 MG/DL (ref 9.8–20.1)
CALCIUM SERPL-MCNC: 10.1 MG/DL (ref 8.4–10.2)
CHLORIDE SERPL-SCNC: 109 MMOL/L (ref 98–107)
CO2 SERPL-SCNC: 23 MMOL/L (ref 23–31)
CREAT SERPL-MCNC: 0.92 MG/DL (ref 0.55–1.02)
CREAT UR-MCNC: 101.7 MG/DL (ref 45–106)
CREAT/UREA NIT SERPL: 17
EST. AVERAGE GLUCOSE BLD GHB EST-MCNC: 114 MG/DL
GFR SERPLBLD CREATININE-BSD FMLA CKD-EPI: >60 ML/MIN/1.73/M2
GLOBULIN SER-MCNC: 4.2 GM/DL (ref 2.4–3.5)
GLUCOSE SERPL-MCNC: 137 MG/DL (ref 82–115)
HBA1C MFR BLD: 5.6 %
MICROALBUMIN UR-MCNC: 13 UG/ML
MICROALBUMIN/CREAT RATIO PNL UR: 12.8 MG/GM CR (ref 0–30)
POTASSIUM SERPL-SCNC: 4.3 MMOL/L (ref 3.5–5.1)
PROT SERPL-MCNC: 8 GM/DL (ref 5.8–7.6)
SODIUM SERPL-SCNC: 141 MMOL/L (ref 136–145)
T4 FREE SERPL-MCNC: 0.9 NG/DL (ref 0.7–1.48)
TSH SERPL-ACNC: 1.25 UIU/ML (ref 0.35–4.94)

## 2024-05-22 PROCEDURE — 99214 OFFICE O/P EST MOD 30 MIN: CPT | Mod: S$PBB,,, | Performed by: NURSE PRACTITIONER

## 2024-05-22 PROCEDURE — 80053 COMPREHEN METABOLIC PANEL: CPT

## 2024-05-22 PROCEDURE — 4010F ACE/ARB THERAPY RXD/TAKEN: CPT | Mod: CPTII,,, | Performed by: NURSE PRACTITIONER

## 2024-05-22 PROCEDURE — 36415 COLL VENOUS BLD VENIPUNCTURE: CPT

## 2024-05-22 PROCEDURE — 3074F SYST BP LT 130 MM HG: CPT | Mod: CPTII,,, | Performed by: NURSE PRACTITIONER

## 2024-05-22 PROCEDURE — 3061F NEG MICROALBUMINURIA REV: CPT | Mod: CPTII,,, | Performed by: NURSE PRACTITIONER

## 2024-05-22 PROCEDURE — 3044F HG A1C LEVEL LT 7.0%: CPT | Mod: CPTII,,, | Performed by: NURSE PRACTITIONER

## 2024-05-22 PROCEDURE — 83036 HEMOGLOBIN GLYCOSYLATED A1C: CPT

## 2024-05-22 PROCEDURE — 82043 UR ALBUMIN QUANTITATIVE: CPT

## 2024-05-22 PROCEDURE — 99214 OFFICE O/P EST MOD 30 MIN: CPT | Mod: PBBFAC | Performed by: NURSE PRACTITIONER

## 2024-05-22 PROCEDURE — 3072F LOW RISK FOR RETINOPATHY: CPT | Mod: CPTII,,, | Performed by: NURSE PRACTITIONER

## 2024-05-22 PROCEDURE — 3066F NEPHROPATHY DOC TX: CPT | Mod: CPTII,,, | Performed by: NURSE PRACTITIONER

## 2024-05-22 PROCEDURE — 3008F BODY MASS INDEX DOCD: CPT | Mod: CPTII,,, | Performed by: NURSE PRACTITIONER

## 2024-05-22 PROCEDURE — 84439 ASSAY OF FREE THYROXINE: CPT

## 2024-05-22 PROCEDURE — 84443 ASSAY THYROID STIM HORMONE: CPT

## 2024-05-22 PROCEDURE — 1159F MED LIST DOCD IN RCRD: CPT | Mod: CPTII,,, | Performed by: NURSE PRACTITIONER

## 2024-05-22 PROCEDURE — 1160F RVW MEDS BY RX/DR IN RCRD: CPT | Mod: CPTII,,, | Performed by: NURSE PRACTITIONER

## 2024-05-22 PROCEDURE — 3078F DIAST BP <80 MM HG: CPT | Mod: CPTII,,, | Performed by: NURSE PRACTITIONER

## 2024-05-22 RX ORDER — DICLOFENAC SODIUM 75 MG/1
75 TABLET, DELAYED RELEASE ORAL 2 TIMES DAILY PRN
Qty: 60 TABLET | Refills: 0 | Status: SHIPPED | OUTPATIENT
Start: 2024-05-22

## 2024-05-22 RX ORDER — TETANUS TOXOID, REDUCED DIPHTHERIA TOXOID AND ACELLULAR PERTUSSIS VACCINE, ADSORBED 5; 2.5; 8; 8; 2.5 [IU]/.5ML; [IU]/.5ML; UG/.5ML; UG/.5ML; UG/.5ML
0.5 SUSPENSION INTRAMUSCULAR ONCE
Qty: 0.5 ML | Refills: 0 | Status: SHIPPED | OUTPATIENT
Start: 2024-05-22 | End: 2024-05-22

## 2024-05-22 RX ORDER — DAPAGLIFLOZIN 5 MG/1
5 TABLET, FILM COATED ORAL DAILY
Qty: 90 TABLET | Refills: 1 | Status: SHIPPED | OUTPATIENT
Start: 2024-05-22

## 2024-05-22 RX ORDER — HYDRALAZINE HYDROCHLORIDE 50 MG/1
50 TABLET, FILM COATED ORAL 2 TIMES DAILY
Qty: 180 TABLET | Refills: 3 | Status: SHIPPED | OUTPATIENT
Start: 2024-05-22 | End: 2025-05-22

## 2024-05-22 NOTE — ASSESSMENT & PLAN NOTE
Counseled for low-sodium, low carb, low-cholesterol, good fat, Dash diet.  Recommend increasing fiber in the diet to lower LDL, increasing fish oil and fish such as salmon may help increase HDL good cholesterol.  Increasing aerobic activity as tolerated may also help lower LDL.  Healthy weight maintenance is advised, portion control, calorie counting, and discussed difference between complex carbs and simple carbs.    Continue current statin therapy.

## 2024-05-22 NOTE — ASSESSMENT & PLAN NOTE
Goal BP < 140/90, best goal is BP <130/80 consistently   Reduce the amount of salt in your diet, follow a 2 gm sodium, DASH diet daily            Lose weight if you are overweight or have obesity  Avoid drinking too much alcohol  Stop smoking  Exercise at least 30 minutes per day most days of the week

## 2024-05-22 NOTE — ASSESSMENT & PLAN NOTE
She denies any exertional chest pain or shortness a breath.  Medically managed and following with Cardiology.

## 2024-05-22 NOTE — ASSESSMENT & PLAN NOTE
Lab Results   Component Value Date    HGBA1C 5.6 05/22/2024    HGBA1C 5.8 01/23/2024    LDL 45.00 (L) 07/20/2023    CREATININE 0.92 05/22/2024      Diabetes Medications               metFORMIN (GLUCOPHAGE) 500 MG tablet Take 1 tablet (500 mg total) by mouth 2 (two) times daily with meals.    dapagliflozin propanediol (FARXIGA) 5 mg Tab tablet Take 1 tablet (5 mg total) by mouth once daily.          On ACE and Statin according to ADA guidelines.  Discussed caution with SGLT2s + diuretics as concomitant use can cause volume depletion. Discussed caution with DPP-Ivs and HF risk.  Follow ADA Diet. Avoid soda, simple sweets, and limit rice/pasta/breads/starches (no more than 45-50 grams per meal).  Maintain healthy weight with goal BMI <30.  Exercise 5 times per week for 30 minutes per day.  Stressed importance of daily foot exams and to wear approved DM shoes.   Stressed importance of annual dilated eye exam.

## 2024-05-22 NOTE — ASSESSMENT & PLAN NOTE
Discussed importance of healthy weight maintenance and disease prevention.  Advised of low carb, low-fat, low-cholesterol, good fat diet, and importance of portion control measures.  Discussed drinking plenty of water daily, getting plenty of sleep nightly, stress reduction, and addressing any underlying lifestyle habits or past/recent stressors that may be contributing to obesity.  Advised of importance of getting 20-30 minutes of aerobic activity and daily.

## 2024-05-22 NOTE — PROGRESS NOTES
[facility]  Aristeo Huitron DNP     Patient ID: [id number]     Chief Complaint: Diabetes (Needs refills, requesting levsin )      HPI:     Fernanda Singh is a 63 y.o. female here today for follow up with PCP, IM clinic. She c/o abd mass with pain x 1 year but has grown larger.     PmHx: T2DM, mild CAD, hypertension, GERD, CKD, chronic tobacco use, obesity, breast mass (2016), uterine CA (5/2017), H.Pylori (2019), colon polyps, chronic back pain.         Health Maintenance         Date Due Completion Date    TETANUS VACCINE Never done ---    Shingles Vaccine (1 of 2) Never done ---    COVID-19 Vaccine (3 - Moderna risk series) 05/07/2021 4/9/2021    RSV Vaccine (Age 60+ and Pregnant patients) (1 - 1-dose 60+ series) Never done ---    Foot Exam 04/26/2024 4/26/2023    Override on 5/11/2022: Done    Lipid Panel 07/20/2024 7/20/2023    Influenza Vaccine (Season Ended) 09/01/2024 10/24/2022    Eye Exam 10/16/2024 10/16/2023    Override on 4/26/2023: Done    Hemoglobin A1c 11/22/2024 5/22/2024    Mammogram 01/13/2025 1/13/2024    High Dose Statin 05/22/2025 5/22/2024    Aspirin/Antiplatelet Therapy 05/22/2025 5/22/2024    Diabetes Urine Screening 05/22/2025 5/22/2024    Colorectal Cancer Screening 05/10/2029 5/10/2019            Past Medical History:   Diagnosis Date    Cancer 2018    CHF (congestive heart failure) 2012    Diabetes mellitus 2019    Disorder of kidney and ureter 2013    GERD (gastroesophageal reflux disease) 2013    Hypertension 2011    Sleep apnea 2013        Past Surgical History:   Procedure Laterality Date    ANGIOGRAM, CORONARY, WITH LEFT HEART CATHETERIZATION N/A 9/28/2022    Procedure: Angiogram, Coronary, with Left Heart Cath;  Surgeon: Milton Connolly MD;  Location: [facility] CATH LAB;  Service: Cardiology;  Laterality: N/A;    BILATERAL TUBAL LIGATION      Biopsy Gastrointestinal  11/13/2019    BIOPSY OF THYROID  07/2019    Catheter Inserrtion Mediport      [id number]    CERVICAL  CONIZATION   W/ LASER  05/01/2018    COLONOSCOPY  05/10/2019    COLONOSCOPY  09/24/2014    DILATION AND CURETTAGE OF UTERUS  05/01/2018    ESOPHAGOGASTRODUODENOSCOPY  09/24/2014    ESOPHAGOGASTRODUODENOSCOPY  11/13/2019    Left breast small mass removal      Myomectomy Hysteroscopic  05/01/2018    POLYPECTOMY  05/10/2019    POLYPECTOMY  09/24/2014    TUBAL LIGATION          Social History     Tobacco Use    Smoking status: Every Day     Current packs/day: 0.50     Average packs/day: 0.5 packs/day for 29.9 years (14.9 ttl pk-yrs)     Types: Cigarettes     Start date: 7/12/1994     Passive exposure: Past    Smokeless tobacco: Never   Substance and Sexual Activity    Alcohol use: Not Currently     Alcohol/week: 1.0 standard drink of alcohol     Types: 1 Glasses of wine per week     Comment: monthly    Drug use: Not Currently    Sexual activity: Yes     Partners: Male        Current Outpatient Medications   Medication Instructions    amLODIPine (NORVASC) 10 mg, Oral, Daily    aspirin (ECOTRIN) 81 mg, Oral, Daily    baclofen (LIORESAL) 10 mg, Oral, 3 times daily PRN    carvediloL (COREG) 25 mg, Oral, 2 times daily    dapagliflozin propanediol (FARXIGA) 5 mg, Oral, Daily    diclofenac (VOLTAREN) 75 mg, Oral, 2 times daily PRN    furosemide (LASIX) 40 mg, Oral, Daily    gabapentin (NEURONTIN) 600 mg, Oral, 3 times daily    hydrALAZINE (APRESOLINE) 50 mg, Oral, 2 times daily    LIDOcaine (LIDODERM) 5 % 1 patch, Transdermal, Daily PRN, Remove & Discard patch within 12 hours or as directed by MD    losartan (COZAAR) 25 mg, Oral, Daily    metFORMIN (GLUCOPHAGE) 500 mg, Oral, 2 times daily with meals    nitroGLYCERIN (NITROSTAT) 0.4 mg, Sublingual, Every 5 min PRN    ondansetron (ZOFRAN) 4 mg, Oral, Every 12 hours PRN    rosuvastatin (CRESTOR) 10 mg, Oral, Nightly       Review of patient's allergies indicates:   Allergen Reactions    Lisinopril Swelling     Other reaction(s): Angioedema of lips        Patient Care  "Team:  Aristeo Huitron FNP as PCP - General (Family Medicine)  Alayna Davenport LPN as Care Coordinator     Subjective:     Review of Systems   Constitutional:  Negative for appetite change, chills, diaphoresis and fever.   HENT:  Negative for ear pain, sinus pain and sore throat.    Eyes:  Negative for pain and visual disturbance.   Respiratory:  Negative for cough, shortness of breath and wheezing.    Cardiovascular:  Negative for chest pain, palpitations and leg swelling.   Gastrointestinal:  Positive for abdominal pain. Negative for blood in stool, diarrhea, nausea and vomiting.   Endocrine: Negative for cold intolerance.   Genitourinary:  Negative for difficulty urinating, dysuria, frequency and hematuria.   Musculoskeletal:  Negative for arthralgias, joint swelling and myalgias.   Skin:  Negative for color change and rash.   Allergic/Immunologic: Negative.    Neurological: Negative.  Negative for dizziness, syncope, light-headedness and numbness.   Hematological: Negative.    Psychiatric/Behavioral: Negative.  Negative for dysphoric mood and suicidal ideas. The patient is not nervous/anxious.    All other systems reviewed and are negative.      12 point review of systems conducted, negative except as stated in the history of present illness. See HPI for details.    Objective:     Visit Vitals  /73 (BP Location: Left arm, Patient Position: Sitting, BP Method: Large (Automatic))   Pulse 60   Temp 98.5 °F (36.9 °C) (Oral)   Resp 20   Ht 5' 7.99" (1.727 m)   Wt 126 kg (277 lb 12.8 oz)   BMI 42.25 kg/m²       Physical Exam  Vitals and nursing note reviewed.   Constitutional:       General: She is not in acute distress.     Appearance: She is not ill-appearing.   HENT:      Head: Normocephalic and atraumatic.      Mouth/Throat:      Mouth: Mucous membranes are moist.      Pharynx: Oropharynx is clear.   Eyes:      General: No scleral icterus.     Extraocular Movements: Extraocular movements intact.      " Conjunctiva/sclera: Conjunctivae normal.      Pupils: Pupils are equal, round, and reactive to light.   Neck:      Vascular: No carotid bruit.   Cardiovascular:      Rate and Rhythm: Normal rate and regular rhythm.      Heart sounds: No murmur heard.     No friction rub. No gallop.   Pulmonary:      Effort: Pulmonary effort is normal. No respiratory distress.      Breath sounds: Normal breath sounds. No wheezing, rhonchi or rales.   Abdominal:      General: Abdomen is flat. Bowel sounds are normal. There is no distension.      Palpations: Abdomen is soft. There is mass.      Tenderness: There is no abdominal tenderness. There is no right CVA tenderness, left CVA tenderness, guarding or rebound.          Comments: Palpable mass right mid quadrant just right of umbilicus and lower, approximately at 7:00 o clock, if using the umbilicus as the clock.  Feels like lipoma texture, mild tenderness with palpation, presentation does not change with lying or standing like a hernia would.  No signs of strangulation.  Approximate size of a lime.    Musculoskeletal:         General: Normal range of motion.      Cervical back: Normal range of motion and neck supple.   Skin:     General: Skin is warm and dry.   Neurological:      General: No focal deficit present.      Mental Status: She is alert.   Psychiatric:         Mood and Affect: Mood normal.         Labs Reviewed:     Chemistry:  Lab Results   Component Value Date     05/22/2024    K 4.3 05/22/2024    CHLORIDE 109 (H) 05/22/2024    BUN 15.9 05/22/2024    CREATININE 0.92 05/22/2024    EGFRNORACEVR >60 05/22/2024    GLUCOSE 137 (H) 05/22/2024    CALCIUM 10.1 05/22/2024    ALKPHOS 110 05/22/2024    LABPROT 8.0 (H) 05/22/2024    ALBUMIN 3.8 05/22/2024    BILIDIR 0.2 04/13/2022    IBILI 0.20 04/13/2022    AST 12 05/22/2024    ALT 14 05/22/2024    MG 1.60 09/29/2022    PHOS 4.7 09/29/2022    TSH 1.255 05/22/2024    QZDKDZ2ZQQW 0.90 05/22/2024        Lab Results   Component  Value Date    HGBA1C 5.6 05/22/2024        Hematology:  Lab Results   Component Value Date    WBC 7.38 07/20/2023    HGB 12.7 07/20/2023    HCT 41.4 07/20/2023     07/20/2023       Lipid Panel:  Lab Results   Component Value Date    CHOL 104 07/20/2023    HDL 44 07/20/2023    LDL 45.00 (L) 07/20/2023    TRIG 73 07/20/2023    TOTALCHOLEST 2 07/20/2023        Urine:  Lab Results   Component Value Date    COLORUA Light-Yellow 01/23/2024    APPEARANCEUA Clear 01/23/2024    SGUA 1.022 01/23/2024    PHUA 5.5 01/23/2024    PROTEINUA Trace (A) 01/23/2024    GLUCOSEUA 2+ (A) 01/23/2024    KETONESUA Negative 01/23/2024    BLOODUA Negative 01/23/2024    NITRITESUA Negative 01/23/2024    LEUKOCYTESUR Negative 01/23/2024    RBCUA 0-5 01/23/2024    WBCUA 0-5 01/23/2024    BACTERIA None Seen 01/23/2024    SQEPUA Occ (A) 01/23/2024    HYALINECASTS None Seen 01/23/2024    CREATRANDUR 101.7 05/22/2024        Assessment:       ICD-10-CM ICD-9-CM   1. Primary hypertension  I10 401.9   2. Type 2 diabetes mellitus without complication, without long-term current use of insulin  E11.9 250.00   3. Hyperlipidemia LDL goal <70  E78.5 272.4   4. Mild CAD  I25.10 414.00   5. Multinodular thyroid  E04.2 241.1   6. Morbid obesity  E66.01 278.01   7. Central abdominal mass  R19.09 789.35        Plan:     1. Primary hypertension  Overview:    Currently on Hydralazine 50mg BID, Carvedilol 25mg BID, Amlodipine 10mg every day, Losartan 25 mg po daily    Assessment & Plan:  Goal BP < 140/90, best goal is BP <130/80 consistently   Reduce the amount of salt in your diet, follow a 2 gm sodium, DASH diet daily            Lose weight if you are overweight or have obesity  Avoid drinking too much alcohol  Stop smoking  Exercise at least 30 minutes per day most days of the week      Orders:  -     hydrALAZINE (APRESOLINE) 50 MG tablet; Take 1 tablet (50 mg total) by mouth 2 (two) times daily.  Dispense: 180 tablet; Refill: 3  -     CBC Auto  Differential; Future; Expected date: 11/22/2024    2. Type 2 diabetes mellitus without complication, without long-term current use of insulin  Overview:  Farxiga 5 mg once daily  Metformin 500 mg twice daily    Assessment & Plan:  Lab Results   Component Value Date    HGBA1C 5.6 05/22/2024    HGBA1C 5.8 01/23/2024    LDL 45.00 (L) 07/20/2023    CREATININE 0.92 05/22/2024      Diabetes Medications               metFORMIN (GLUCOPHAGE) 500 MG tablet Take 1 tablet (500 mg total) by mouth 2 (two) times daily with meals.    dapagliflozin propanediol (FARXIGA) 5 mg Tab tablet Take 1 tablet (5 mg total) by mouth once daily.          On ACE and Statin according to ADA guidelines.  Discussed caution with SGLT2s + diuretics as concomitant use can cause volume depletion. Discussed caution with DPP-Ivs and HF risk.  Follow ADA Diet. Avoid soda, simple sweets, and limit rice/pasta/breads/starches (no more than 45-50 grams per meal).  Maintain healthy weight with goal BMI <30.  Exercise 5 times per week for 30 minutes per day.  Stressed importance of daily foot exams and to wear approved DM shoes.   Stressed importance of annual dilated eye exam.          Orders:  -     dapagliflozin propanediol (FARXIGA) 5 mg Tab tablet; Take 1 tablet (5 mg total) by mouth once daily.  Dispense: 90 tablet; Refill: 1  -     Comprehensive Metabolic Panel; Future; Expected date: 05/22/2024  -     Hemoglobin A1C; Future; Expected date: 05/22/2024  -     Microalbumin/Creatinine Ratio, Urine; Future; Expected date: 05/22/2024  -     Hemoglobin A1C; Future; Expected date: 11/22/2024  -     Comprehensive Metabolic Panel; Future; Expected date: 11/22/2024  -     Microalbumin/Creatinine Ratio, Urine; Future; Expected date: 11/22/2024    3. Hyperlipidemia LDL goal <70  Assessment & Plan:  Counseled for low-sodium, low carb, low-cholesterol, good fat, Dash diet.  Recommend increasing fiber in the diet to lower LDL, increasing fish oil and fish such as salmon  may help increase HDL good cholesterol.  Increasing aerobic activity as tolerated may also help lower LDL.  Healthy weight maintenance is advised, portion control, calorie counting, and discussed difference between complex carbs and simple carbs.    Continue current statin therapy.        4. Mild CAD  Assessment & Plan:  She denies any exertional chest pain or shortness a breath.  Medically managed and following with Cardiology.      5. Multinodular thyroid  -     TSH; Future; Expected date: 05/22/2024  -     T4, Free; Future; Expected date: 05/22/2024    6. Morbid obesity  Assessment & Plan:  Discussed importance of healthy weight maintenance and disease prevention.  Advised of low carb, low-fat, low-cholesterol, good fat diet, and importance of portion control measures.  Discussed drinking plenty of water daily, getting plenty of sleep nightly, stress reduction, and addressing any underlying lifestyle habits or past/recent stressors that may be contributing to obesity.  Advised of importance of getting 20-30 minutes of aerobic activity and daily.        7. Central abdominal mass  -     CT Abdomen Pelvis With IV Contrast Routine Oral Contrast; Future; Expected date: 05/22/2024    Other orders  -     diclofenac (VOLTAREN) 75 MG EC tablet; Take 1 tablet (75 mg total) by mouth 2 (two) times daily as needed.  Dispense: 60 tablet; Refill: 0  -     Discontinue: diphth,pertus,acell,,tetanus (BOOSTRIX TDAP) 2.5-8-5 Lf-mcg-Lf/0.5mL Syrg injection; Inject 0.5 mLs into the muscle once. for 1 dose  Dispense: 0.5 mL; Refill: 0         Follow up in about 6 months (around 11/22/2024) for follow up, with lab work prior to visit. In addition to their scheduled follow up, the patient has also been instructed to follow up on as needed basis.     Future Appointments   Date Time Provider Department Center   8/23/2024  8:45 AM Stephanie Lassiter PA-C [facility]    11/22/2024  8:20 AM Aristeo Huitron FNP [facility]  [facility] Huitron, P

## 2024-06-06 ENCOUNTER — HOSPITAL ENCOUNTER (OUTPATIENT)
Dept: RADIOLOGY | Facility: HOSPITAL | Age: 64
Discharge: HOME | End: 2024-06-06
Attending: NURSE PRACTITIONER
Payer: MEDICARE

## 2024-06-06 DIAGNOSIS — R19.09 CENTRAL ABDOMINAL MASS: ICD-10-CM

## 2024-06-06 PROCEDURE — 74177 CT ABD & PELVIS W/CONTRAST: CPT | Mod: TC

## 2024-06-06 PROCEDURE — 25500020 PHARM REV CODE 255

## 2024-06-06 PROCEDURE — A9698 NON-RAD CONTRAST MATERIALNOC: HCPCS

## 2024-06-06 RX ADMIN — IOHEXOL 100 ML: 350 INJECTION, SOLUTION INTRAVENOUS at 11:06

## 2024-06-06 RX ADMIN — BARIUM SULFATE 750 ML: 20 SUSPENSION ORAL at 11:06

## 2024-06-10 ENCOUNTER — PATIENT MESSAGE (OUTPATIENT)
Dept: INTERNAL MEDICINE | Facility: CLINIC | Age: 64
End: 2024-06-10
Payer: MEDICARE

## 2024-06-10 DIAGNOSIS — K43.9 VENTRAL HERNIA WITHOUT OBSTRUCTION OR GANGRENE: Primary | ICD-10-CM

## 2024-06-18 ENCOUNTER — OFFICE VISIT (OUTPATIENT)
Dept: SURGERY | Facility: CLINIC | Age: 64
End: 2024-06-18
Payer: MEDICARE

## 2024-06-18 VITALS
WEIGHT: 272.63 LBS | RESPIRATION RATE: 18 BRPM | TEMPERATURE: 98 F | BODY MASS INDEX: 42.79 KG/M2 | SYSTOLIC BLOOD PRESSURE: 127 MMHG | HEART RATE: 66 BPM | DIASTOLIC BLOOD PRESSURE: 65 MMHG | HEIGHT: 67 IN | OXYGEN SATURATION: 100 %

## 2024-06-18 DIAGNOSIS — K43.9 VENTRAL HERNIA WITHOUT OBSTRUCTION OR GANGRENE: ICD-10-CM

## 2024-06-18 PROCEDURE — 99215 OFFICE O/P EST HI 40 MIN: CPT | Mod: PBBFAC

## 2024-06-18 NOTE — PROGRESS NOTES
Patient seen in gen surg clinic by Dr Warner.  Will refer for bariatric surgery.  Follow up gen surg PRN.

## 2024-06-18 NOTE — PROGRESS NOTES
[facility] GENERAL SURGERY   Clinic Note       CC: Ventral hernia       HPI: Fernanda Singh is a 63 y.o. female with PMHx of CAD, HFpEF, HTN, GERD, CKD, tobacco use, and uterine cancer (2018) s/p hysterectomy and chemo/rads who presents for evaluation of a ventral hernia. She states that the hernia has been present for at least 6 months and has been slowing enlarging. She has intermittent pain and occasional nausea. She denies fevers, chills, or vomiting. She smokes 1 ppd and denies alcohol or illicit drug use. Surgical history includes tubal ligation and a hysterectomy.       Physical Exam:   Vitals:    06/18/24 0901   BP: 127/65   Pulse: 66   Resp: 18   Temp: 97.9 °F (36.6 °C)      Gen: NAD, AAOx3   Eye: CORINE, EOMI   CVS: RRR   Chest: Non-labored breathing on room air   Abd: Soft, ND, chronically incarcerated rosalba-umbilical hernia, mildly tender to palpation, no guarding  Ext:  Moves all 4 extremities.         Interval Imaging:    CT Abdomen Pelvis w/ contrast 6/6/24  -Impression:     1. No acute abnormality of the abdomen and pelvis.  2. Infraumbilical fat containing ventral hernia, hernia neck measures 2.5 cm         A/P: Fernanda Singh is a 63 y.o. female with PMHx of CAD, HFpEF, HTN, GERD, CKD, tobacco use, and uterine cancer (2018) s/p hysterectomy and chemo/rads who presents for evaluation of a ventral hernia.      - Current BMI 42, needs to be 35 prior to consideration for surgery, discussed goal weight with patient  - Patient interested in Bariatric Surgery, referral placed  - Instructed patient to make a follow up appointment when goal weight is reached      Caroline Warner MD  [facility] General Surgery PGY-3

## 2024-07-16 ENCOUNTER — TELEPHONE (OUTPATIENT)
Dept: GYNECOLOGY | Facility: CLINIC | Age: 64
End: 2024-07-16
Payer: MEDICARE

## 2024-07-16 NOTE — TELEPHONE ENCOUNTER
"I called patient twice, I could hear her say "hello" but she was unable to hear me. I've made a [place] appt for her as requested by [facility] GYN ONC Dr WALL for July16th at 8:15am, sent via email to Ana .  Will mail to patient at address listed on chart.  "

## 2024-07-17 RX ORDER — DICLOFENAC SODIUM 75 MG/1
75 TABLET, DELAYED RELEASE ORAL 2 TIMES DAILY PRN
Qty: 60 TABLET | Refills: 0 | Status: SHIPPED | OUTPATIENT
Start: 2024-07-17

## 2024-07-17 NOTE — TELEPHONE ENCOUNTER
Pharmacy requesting refill for pt's diclofenac (VOLTAREN) 75 MG EC tablet     LOV:5/22/24    NOV:11/22/24

## 2024-08-01 ENCOUNTER — PATIENT MESSAGE (OUTPATIENT)
Dept: INTERNAL MEDICINE | Facility: CLINIC | Age: 64
End: 2024-08-01
Payer: MEDICARE

## 2024-08-01 ENCOUNTER — TELEPHONE (OUTPATIENT)
Dept: INTERNAL MEDICINE | Facility: CLINIC | Age: 64
End: 2024-08-01
Payer: MEDICARE

## 2024-08-23 ENCOUNTER — LAB VISIT (OUTPATIENT)
Dept: LAB | Facility: HOSPITAL | Age: 64
End: 2024-08-23
Attending: EMERGENCY MEDICINE
Payer: MEDICARE

## 2024-08-23 ENCOUNTER — OFFICE VISIT (OUTPATIENT)
Dept: CARDIOLOGY | Facility: CLINIC | Age: 64
End: 2024-08-23
Payer: MEDICARE

## 2024-08-23 VITALS
OXYGEN SATURATION: 98 % | HEART RATE: 66 BPM | SYSTOLIC BLOOD PRESSURE: 122 MMHG | WEIGHT: 267.88 LBS | HEIGHT: 68 IN | BODY MASS INDEX: 40.6 KG/M2 | TEMPERATURE: 99 F | RESPIRATION RATE: 18 BRPM | DIASTOLIC BLOOD PRESSURE: 75 MMHG

## 2024-08-23 DIAGNOSIS — I10 PRIMARY HYPERTENSION: ICD-10-CM

## 2024-08-23 DIAGNOSIS — I50.9 CONGESTIVE HEART FAILURE, UNSPECIFIED HF CHRONICITY, UNSPECIFIED HEART FAILURE TYPE: ICD-10-CM

## 2024-08-23 DIAGNOSIS — E78.5 HYPERLIPIDEMIA LDL GOAL <70: ICD-10-CM

## 2024-08-23 DIAGNOSIS — E11.9 TYPE 2 DIABETES MELLITUS WITHOUT COMPLICATION, WITHOUT LONG-TERM CURRENT USE OF INSULIN: ICD-10-CM

## 2024-08-23 DIAGNOSIS — R00.2 PALPITATIONS: ICD-10-CM

## 2024-08-23 DIAGNOSIS — I25.10 MILD CAD: ICD-10-CM

## 2024-08-23 DIAGNOSIS — I50.32 CHRONIC HEART FAILURE WITH PRESERVED EJECTION FRACTION: ICD-10-CM

## 2024-08-23 DIAGNOSIS — E66.01 MORBID OBESITY: ICD-10-CM

## 2024-08-23 DIAGNOSIS — Z72.0 TOBACCO USER: ICD-10-CM

## 2024-08-23 DIAGNOSIS — I25.10 MILD CAD: Primary | ICD-10-CM

## 2024-08-23 DIAGNOSIS — R07.89 OTHER CHEST PAIN: ICD-10-CM

## 2024-08-23 LAB
ALBUMIN SERPL-MCNC: 3.8 G/DL (ref 3.4–4.8)
ALBUMIN/GLOB SERPL: 1 RATIO (ref 1.1–2)
ALP SERPL-CCNC: 123 UNIT/L (ref 40–150)
ALT SERPL-CCNC: 16 UNIT/L (ref 0–55)
ANION GAP SERPL CALC-SCNC: 8 MEQ/L
AST SERPL-CCNC: 13 UNIT/L (ref 5–34)
BILIRUB SERPL-MCNC: 0.3 MG/DL
BUN SERPL-MCNC: 13.8 MG/DL (ref 9.8–20.1)
CALCIUM SERPL-MCNC: 9.9 MG/DL (ref 8.4–10.2)
CHLORIDE SERPL-SCNC: 110 MMOL/L (ref 98–107)
CHOLEST SERPL-MCNC: 105 MG/DL
CHOLEST/HDLC SERPL: 3 {RATIO} (ref 0–5)
CO2 SERPL-SCNC: 23 MMOL/L (ref 23–31)
CREAT SERPL-MCNC: 0.93 MG/DL (ref 0.55–1.02)
CREAT/UREA NIT SERPL: 15
GFR SERPLBLD CREATININE-BSD FMLA CKD-EPI: >60 ML/MIN/1.73/M2
GLOBULIN SER-MCNC: 4 GM/DL (ref 2.4–3.5)
GLUCOSE SERPL-MCNC: 138 MG/DL (ref 82–115)
HDLC SERPL-MCNC: 42 MG/DL (ref 35–60)
LDLC SERPL CALC-MCNC: 49 MG/DL (ref 50–140)
OHS QRS DURATION: 92 MS
OHS QTC CALCULATION: 435 MS
POTASSIUM SERPL-SCNC: 3.9 MMOL/L (ref 3.5–5.1)
PROT SERPL-MCNC: 7.8 GM/DL (ref 5.8–7.6)
SODIUM SERPL-SCNC: 141 MMOL/L (ref 136–145)
TRIGL SERPL-MCNC: 69 MG/DL (ref 37–140)
VLDLC SERPL CALC-MCNC: 14 MG/DL

## 2024-08-23 PROCEDURE — 80053 COMPREHEN METABOLIC PANEL: CPT

## 2024-08-23 PROCEDURE — 99215 OFFICE O/P EST HI 40 MIN: CPT | Mod: PBBFAC,25

## 2024-08-23 PROCEDURE — 36415 COLL VENOUS BLD VENIPUNCTURE: CPT

## 2024-08-23 PROCEDURE — 93005 ELECTROCARDIOGRAM TRACING: CPT

## 2024-08-23 PROCEDURE — 80061 LIPID PANEL: CPT

## 2024-08-23 RX ORDER — AMLODIPINE BESYLATE 10 MG/1
10 TABLET ORAL DAILY
Qty: 90 TABLET | Refills: 3 | Status: SHIPPED | OUTPATIENT
Start: 2024-08-23 | End: 2025-08-23

## 2024-08-23 RX ORDER — NITROGLYCERIN 0.4 MG/1
0.4 TABLET SUBLINGUAL EVERY 5 MIN PRN
Qty: 25 TABLET | Refills: 3 | Status: SHIPPED | OUTPATIENT
Start: 2024-08-23 | End: 2025-08-23

## 2024-08-23 RX ORDER — ROSUVASTATIN CALCIUM 10 MG/1
10 TABLET, COATED ORAL NIGHTLY
Qty: 90 TABLET | Refills: 3 | Status: SHIPPED | OUTPATIENT
Start: 2024-08-23 | End: 2025-08-23

## 2024-08-23 RX ORDER — CARVEDILOL 25 MG/1
25 TABLET ORAL 2 TIMES DAILY
Qty: 180 TABLET | Refills: 3 | Status: SHIPPED | OUTPATIENT
Start: 2024-08-23 | End: 2025-08-23

## 2024-08-23 NOTE — PROGRESS NOTES
CHIEF COMPLAINT:   Chief Complaint   Patient presents with    Follow-up     6 mos f/u  pt c/o dizziness                                                  HPI:  Fernanda Singh 64 y.o. female  with a PMH significant for mild CAD, hypertension, GERD, CKD, chronic tobacco use, obesity, breast mass (2016), uterine CA (5/2017), and hysterectomy who presents to cardiology clinic for follow up and ongoing care.  Last office visit patient stated that she was feeling wonderful from a cardiac standpoint but was dealing with some GI issues.  Since then, she was diagnosed with a hernia and continues to have some GI symptoms.  She was being follow up closely with GI and PCP.    Today the patient states that she feels well from a cardiac standpoint.  She states that last week she had an episode of lightheadedness and dizziness that was significant for her.  She denies any near syncopal or actual syncopal episodes, but states that she felt weak at that time.  Upon further questioning she admitted that she has been driving for who were and had not been hydrating and she believes that towards it if her work she was just extremely dehydrated.  Her dehydration likely lead to a decrease in blood pressure and some orthostatic symptoms.  She states that since then she has not had any further episodes.  Counseled on importance of hydration, compression stockings, and changing positions slowly to avoid orthostatic symptoms.  Otherwise she denies any further complaints such as chest pain, palpitations, PND, orthopnea, peripheral edema, or claudication symptoms.  She was able to complete her ADLs and her job duties without any issues or ischemic symptoms.  She does not endorse much formal exercise but states that she stays moving around.  She reports compliance with all her current medications and is tolerating them well.  She follows a mostly heart healthy diet, but states that because of her recent GI issues her appetite has been  a bit decreased.  She continues to smoke approximately 1/2 pack of cigarettes per day but is trying to quit on her own.                                                                                                                                                                                                       CARDIAC TESTIN Hour Holter 22  Underlying rhythm:   Sinus  Arrythmia:  No significant arrhythmia noted     Pauses:  No significant pause noted  Symptoms:  No diary returned    Events:  No patient marked events           Echocardiogram  Results for orders placed during the hospital encounter of 22  Echo  Interpretation Summary  · The left ventricle is normal in size with concentric hypertrophy and normal systolic function. The estimated ejection fraction is 60%.  · Normal right ventricular size with normal right ventricular systolic function.        Stress Test  Results for orders placed during the hospital encounter of 22  Nuclear Stress - Cardiology Interpreted  Interpretation Summary    Abnormal myocardial perfusion scan.    There is a moderate intensity, small sized, reversible perfusion abnormality that is consistent with ischemia in the distal anteroseptal and apical wall(s) in the typical distribution of the LAD territory.    There are no other significant perfusion abnormalities.    The gated perfusion images showed an ejection fraction of 59% at rest. The gated perfusion images showed an ejection fraction of 50% post stress.    There is normal wall motion at rest and post stress.    LV cavity size is normal at rest and normal at stress.    The EKG portion of this study is positive for ischemia.    The patient reported no chest pain during the stress test.        Coronary Angiogram  Results for orders placed during the hospital encounter of 22  Cardiac catheterization  Conclusion    The pre-procedure left ventricular end diastolic pressure was 5.    The  estimated blood loss was none.    The coronary arteries were normal..  FINDINGS  LVEDP:  5 mmHg  The patient has No CAD  Blood loss:  less than 15 cc.  RECOMMENDATIONS  Medical management  Weight loss.  Blood pressure control.  Statin therapy.  Activity -- avoid straining with affected limbs for one week  Exercise -- as tolerated  Precautions post D/C -- come to ER for hematoma, unusual pain, erythema, or unusual drainage at access site  Precautions post D/C -- if develop bleeding or hematoma at access site, hold pressure at access site, and come to ER  POST-CATH GUIDELINE FOR INPATIENT DISCHARGE  No hematoma or swelling at access site  No unusual tenderness at access site  Adequate distal pulse or capillary refill in limb(s) accessed  No unusual difficulty with ambulation after bed rest is over        48-hour Holter monitor February 2021:  The patient was in sinus rhythm with an average heart rate of 77 bpm. Heart rates greater than 120 bpm were noted less than 1% of the time. No episodes of bradycardia was noted. Fine less than 1% of beats were due to PVCs. Short burst of V run lasting for 4 beats at 200 bpm.  Occasional PACs. Episode of SVT lasting for 8 beats at 148 bpm.  No diary was returned.    Echo 2/20  Mild concentric left ventricular hypertrophy.Left ventricular ejection fraction is measured at approximately 50 to 55 %.    Holter (48hr)-11/2016 : Patient was in normal sinus rhythm throughout. Supraventricular ectopic beats consisted of very rare premature beats. Ventricular ectopic beats consisted of very rare unifocal beats.    Left heart cath on September 19, 2016:  Mild CAD  Hypertensive disease  Normal LVEF 60%  Recommendations: Medical management and aggressive risk factor management    ECHO (2015)  EF >55%  Patient Active Problem List   Diagnosis    Anxiety    CHF (congestive heart failure)    Degenerative disc disease, lumbar    Endometrial cancer    Gastroesophageal reflux disease     Primary hypertension    Morbid obesity    Peripheral neuropathy due to and not concurrent with chemotherapy    Diabetes    Tobacco user    Multinodular thyroid    Uterine fibroid    Palpitations    Hyperlipidemia LDL goal <70    Mild CAD    Unstable angina    Chronic heart failure with preserved ejection fraction    Foot callus    Central abdominal mass     Past Surgical History:   Procedure Laterality Date    ANGIOGRAM, CORONARY, WITH LEFT HEART CATHETERIZATION N/A 9/28/2022    Procedure: Angiogram, Coronary, with Left Heart Cath;  Surgeon: Milton Connolly MD;  Location: [facility] CATH LAB;  Service: Cardiology;  Laterality: N/A;    BILATERAL TUBAL LIGATION      Biopsy Gastrointestinal  11/13/2019    BIOPSY OF THYROID  07/2019    Catheter Inserr[facility]      [id number]    CERVICAL CONIZATION   W/ LASER  05/01/2018    COLONOSCOPY  05/10/2019    COLONOSCOPY  09/24/2014    DILATION AND CURETTAGE OF UTERUS  05/01/2018    ESOPHAGOGASTRODUODENOSCOPY  09/24/2014    ESOPHAGOGASTRODUODENOSCOPY  11/13/2019    Left breast small mass removal      Myomectomy Hysteroscopic  05/01/2018    POLYPECTOMY  05/10/2019    POLYPECTOMY  09/24/2014    TUBAL LIGATION       Social History     Socioeconomic History    Marital status:      Spouse name: Jude    Number of children: 3    Highest education level: Associate degree: occupational, technical, or vocational program   Occupational History    Occupation: unemployed   Tobacco Use    Smoking status: Every Day     Current packs/day: 0.50     Average packs/day: 0.5 packs/day for 30.1 years (15.1 ttl pk-yrs)     Types: Cigarettes     Start date: 7/12/1994     Passive exposure: Past    Smokeless tobacco: Never   Substance and Sexual Activity    Alcohol use: Not Currently     Alcohol/week: 1.0 standard drink of alcohol     Types: 1 Glasses of wine per week     Comment: monthly    Drug use: Not Currently    Sexual activity: Yes     Partners: Male      Social Determinants of Health     Financial Resource Strain: Low Risk  (12/7/2022)    Overall Financial Resource Strain (CARDIA)     Difficulty of Paying Living Expenses: Not hard at all   Recent Concern: Financial Resource Strain - Medium Risk (9/28/2022)    Overall Financial Resource Strain (CARDIA)     Difficulty of Paying Living Expenses: Somewhat hard   Food Insecurity: No Food Insecurity (12/7/2022)    Hunger Vital Sign     Worried About Running Out of Food in the Last Year: Never true     Ran Out of Food in the Last Year: Never true   Transportation Needs: No Transportation Needs (12/7/2022)    PRAPARE - Transportation     Lack of Transportation (Medical): No     Lack of Transportation (Non-Medical): No   Physical Activity: Inactive (12/7/2022)    Exercise Vital Sign     Days of Exercise per Week: 0 days     Minutes of Exercise per Session: 0 min   Stress: No Stress Concern Present (12/7/2022)    [employer] - Occupational Stress Questionnaire     Feeling of Stress : Not at all   Housing Stability: High Risk (12/7/2022)    Housing Stability Vital Sign     Unable to Pay for Housing in the Last Year: Yes     Number of Places Lived in the Last Year: 1     Unstable Housing in the Last Year: No        Family History   Problem Relation Name Age of Onset    Hypertension Mother Mady Andersen     Heart failure Mother Mady Andersen     Kidney failure Mother Mady Andersen     Heart attack Father Leroy Maher     Hypertension Father Leroy Maher     Diabetes Father Leroy Maher     Leukemia Brother Vignesh     Cirrhosis Brother Hay     Hypertension Brother Kofi     Esophageal cancer Brother Kofi      Review of patient's allergies indicates:   Allergen Reactions    Lisinopril Swelling     Other reaction(s): Angioedema of lips         ROS:  Review of Systems   Constitutional: Negative.    HENT: Negative.     Eyes: Negative.    Respiratory:  Negative.  Negative for shortness of breath.    Cardiovascular: Negative.  Negative for chest pain, palpitations, orthopnea, claudication, leg swelling and PND.   Gastrointestinal:  Positive for abdominal pain.   Genitourinary: Negative.    Musculoskeletal:  Positive for back pain.   Skin: Negative.    Neurological: Negative.  Negative for dizziness and weakness.   Endo/Heme/Allergies: Negative.    Psychiatric/Behavioral: Negative.                                                                                                                                                                                  Negative except as stated in the history of present illness. See HPI for details.    PHYSICAL EXAM:  There were no vitals taken for this visit.      Physical Exam  Constitutional:       Appearance: Normal appearance. She is obese. She is not ill-appearing.   HENT:      Head: Normocephalic.      Nose: Nose normal.   Eyes:      Extraocular Movements: Extraocular movements intact.   Neck:      Vascular: No carotid bruit.   Cardiovascular:      Rate and Rhythm: Normal rate and regular rhythm.      Pulses: Normal pulses.      Heart sounds: Normal heart sounds. No murmur heard.  Pulmonary:      Effort: Pulmonary effort is normal. No respiratory distress.   Abdominal:      General: There is no distension.   Musculoskeletal:         General: Normal range of motion.      Cervical back: Normal range of motion.      Right lower leg: No edema.      Left lower leg: No edema.   Skin:     General: Skin is warm.   Neurological:      General: No focal deficit present.      Mental Status: She is alert.   Psychiatric:         Mood and Affect: Mood normal.       Current Outpatient Medications   Medication Instructions    amLODIPine (NORVASC) 10 mg, Oral, Daily    aspirin (ECOTRIN) 81 mg, Oral, Daily    baclofen (LIORESAL) 10 mg, Oral, 3 times daily PRN    carvediloL (COREG) 25 mg, Oral, 2 times daily    dapagliflozin propanediol  (FARXIGA) 5 mg, Oral, Daily    diclofenac (VOLTAREN) 75 mg, Oral, 2 times daily PRN    furosemide (LASIX) 40 mg, Oral, Daily    gabapentin (NEURONTIN) 600 mg, Oral, 3 times daily    hydrALAZINE (APRESOLINE) 50 mg, Oral, 2 times daily    LIDOcaine (LIDODERM) 5 % 1 patch, Transdermal, Daily PRN, Remove & Discard patch within 12 hours or as directed by MD    losartan (COZAAR) 25 mg, Oral, Daily    metFORMIN (GLUCOPHAGE) 500 mg, Oral, 2 times daily with meals    nitroGLYCERIN (NITROSTAT) 0.4 mg, Sublingual, Every 5 min PRN    ondansetron (ZOFRAN) 4 mg, Oral, Every 12 hours PRN    rosuvastatin (CRESTOR) 10 mg, Oral, Nightly        All medications, laboratory studies, cardiac diagnostic imaging reviewed.     Lab Results   Component Value Date    LDL 45.00 (L) 07/20/2023    LDL 56.00 04/13/2022    TRIG 73 07/20/2023    TRIG 72 04/13/2022    CREATININE 0.92 05/22/2024    MG 1.60 09/29/2022    K 4.3 05/22/2024        ASSESSMENT/PLAN:  Mild CAD per [facility] in 2016      - CAD is quiescent she is asymptomatic and denies any anginal or anginal equivalent symptoms today       - S/p [facility] on 9.28.22 which revealed normal coronary arteries       - Continue aspirin, coreg, and crestor      - Counseled on heart healthy diet, exercise, and smoking cessation      - EKG today with sinus bradycardia, possible LAE, left axis deviation, left ventricular hypertrophy, nonspecific ST and T-wave abnormality, vent rate 58 beats per minute (unchanged from prior)    HFpEF      - Stable from last visit- asymptomatic           - Denies any heart failure symptoms such as shortness of breath or lower extremity edema or other congestive symptoms      - EF 60% per Echo 9.26.22      - Continue Lasix      - Counseled on BP control and diet and exercise     HTN (hypertension)       - BP at goal 122/75      - Continue Amlodipine, Coreg, hydralazine, Losartan, and Lasix      - Counseled on low salt diet and exercise as tolerated     Palpitations      -  Much improved from prior - denies have any palpitations since last visit         - Asymptomatic-Denies any recent palpitations, lightheadedness, dizziness, fluttering, or tachycardic symptoms      - 48 hour Holter monitor February 2021 - revealed she was in sinus rhythm with an average heart rate of 77 bpm. See report under HPI.       - Repeat Holter December 2022 was WNL     HLD      - LDL at goal - 45 per labs 7.20.23      - Continue Crestor 10mg daily       - Repeat FLP/CMP today- patient going down to lab after visit       - Counseled on low cholesterol diet and exercise as tolerated     DM      - A1C at goal - 5.6      - Management per PCP    Tobacco use      - Counseled on smoking cessation      - She currently smokes up to 1/2 PPD      EKG today   Complete FLP/CMP today   Follow up in cardiology clinic in 6 months or sooner if needed   Follow up with PCP as directed   Please notify clinic of any new concerns or any change in symptoms

## 2024-08-23 NOTE — PATIENT INSTRUCTIONS
EKG today   Complete FLP/CMP today   Follow up in cardiology clinic in 6 months or sooner if needed   Follow up with PCP as directed   Please notify clinic of any new concerns or any change in symptoms

## 2024-10-30 ENCOUNTER — PATIENT MESSAGE (OUTPATIENT)
Dept: FAMILY MEDICINE | Facility: CLINIC | Age: 64
End: 2024-10-30
Payer: MEDICARE

## 2024-11-01 DIAGNOSIS — E11.9 TYPE 2 DIABETES MELLITUS WITHOUT COMPLICATION, WITHOUT LONG-TERM CURRENT USE OF INSULIN: ICD-10-CM

## 2024-11-04 RX ORDER — METFORMIN HYDROCHLORIDE 500 MG/1
500 TABLET ORAL 2 TIMES DAILY WITH MEALS
Qty: 180 TABLET | Refills: 1 | Status: SHIPPED | OUTPATIENT
Start: 2024-11-04 | End: 2025-05-03

## 2024-11-19 DIAGNOSIS — I10 PRIMARY HYPERTENSION: ICD-10-CM

## 2024-11-20 RX ORDER — LOSARTAN POTASSIUM 25 MG/1
25 TABLET ORAL DAILY
Qty: 90 TABLET | Refills: 3 | Status: SHIPPED | OUTPATIENT
Start: 2024-11-20 | End: 2025-11-20

## 2024-11-21 ENCOUNTER — LAB VISIT (OUTPATIENT)
Dept: LAB | Facility: HOSPITAL | Age: 64
End: 2024-11-21
Attending: NURSE PRACTITIONER
Payer: MEDICARE

## 2024-11-21 DIAGNOSIS — E11.9 TYPE 2 DIABETES MELLITUS WITHOUT COMPLICATION, WITHOUT LONG-TERM CURRENT USE OF INSULIN: ICD-10-CM

## 2024-11-21 DIAGNOSIS — I10 PRIMARY HYPERTENSION: ICD-10-CM

## 2024-11-21 LAB
ALBUMIN SERPL-MCNC: 3.7 G/DL (ref 3.4–4.8)
ALBUMIN/GLOB SERPL: 0.9 RATIO (ref 1.1–2)
ALP SERPL-CCNC: 115 UNIT/L (ref 40–150)
ALT SERPL-CCNC: 11 UNIT/L (ref 0–55)
ANION GAP SERPL CALC-SCNC: 8 MEQ/L
AST SERPL-CCNC: 11 UNIT/L (ref 5–34)
BASOPHILS # BLD AUTO: 0.03 X10(3)/MCL
BASOPHILS NFR BLD AUTO: 0.4 %
BILIRUB SERPL-MCNC: 0.3 MG/DL
BUN SERPL-MCNC: 11.7 MG/DL (ref 9.8–20.1)
CALCIUM SERPL-MCNC: 9.8 MG/DL (ref 8.4–10.2)
CHLORIDE SERPL-SCNC: 108 MMOL/L (ref 98–107)
CO2 SERPL-SCNC: 25 MMOL/L (ref 23–31)
CREAT SERPL-MCNC: 0.86 MG/DL (ref 0.55–1.02)
CREAT UR-MCNC: 90.7 MG/DL (ref 45–106)
CREAT/UREA NIT SERPL: 14
EOSINOPHIL # BLD AUTO: 0.18 X10(3)/MCL (ref 0–0.9)
EOSINOPHIL NFR BLD AUTO: 2.4 %
ERYTHROCYTE [DISTWIDTH] IN BLOOD BY AUTOMATED COUNT: 16.3 % (ref 11.5–17)
EST. AVERAGE GLUCOSE BLD GHB EST-MCNC: 105.4 MG/DL
GFR SERPLBLD CREATININE-BSD FMLA CKD-EPI: >60 ML/MIN/1.73/M2
GLOBULIN SER-MCNC: 4 GM/DL (ref 2.4–3.5)
GLUCOSE SERPL-MCNC: 115 MG/DL (ref 82–115)
HBA1C MFR BLD: 5.3 %
HCT VFR BLD AUTO: 39.9 % (ref 37–47)
HGB BLD-MCNC: 13 G/DL (ref 12–16)
IMM GRANULOCYTES # BLD AUTO: 0.03 X10(3)/MCL (ref 0–0.04)
IMM GRANULOCYTES NFR BLD AUTO: 0.4 %
LYMPHOCYTES # BLD AUTO: 2.63 X10(3)/MCL (ref 0.6–4.6)
LYMPHOCYTES NFR BLD AUTO: 34.9 %
MCH RBC QN AUTO: 25.6 PG (ref 27–31)
MCHC RBC AUTO-ENTMCNC: 32.6 G/DL (ref 33–36)
MCV RBC AUTO: 78.7 FL (ref 80–94)
MICROALBUMIN UR-MCNC: 9.6 UG/ML
MICROALBUMIN/CREAT RATIO PNL UR: 10.6 MG/GM CR (ref 0–30)
MONOCYTES # BLD AUTO: 0.57 X10(3)/MCL (ref 0.1–1.3)
MONOCYTES NFR BLD AUTO: 7.6 %
NEUTROPHILS # BLD AUTO: 4.1 X10(3)/MCL (ref 2.1–9.2)
NEUTROPHILS NFR BLD AUTO: 54.3 %
NRBC BLD AUTO-RTO: 0 %
PLATELET # BLD AUTO: 273 X10(3)/MCL (ref 130–400)
PMV BLD AUTO: 10.4 FL (ref 7.4–10.4)
POTASSIUM SERPL-SCNC: 4 MMOL/L (ref 3.5–5.1)
PROT SERPL-MCNC: 7.7 GM/DL (ref 5.8–7.6)
RBC # BLD AUTO: 5.07 X10(6)/MCL (ref 4.2–5.4)
SODIUM SERPL-SCNC: 141 MMOL/L (ref 136–145)
WBC # BLD AUTO: 7.54 X10(3)/MCL (ref 4.5–11.5)

## 2024-11-21 PROCEDURE — 80053 COMPREHEN METABOLIC PANEL: CPT

## 2024-11-21 PROCEDURE — 36415 COLL VENOUS BLD VENIPUNCTURE: CPT

## 2024-11-21 PROCEDURE — 83036 HEMOGLOBIN GLYCOSYLATED A1C: CPT

## 2024-11-21 PROCEDURE — 82570 ASSAY OF URINE CREATININE: CPT

## 2024-11-21 PROCEDURE — 85025 COMPLETE CBC W/AUTO DIFF WBC: CPT

## 2024-11-22 ENCOUNTER — OFFICE VISIT (OUTPATIENT)
Dept: FAMILY MEDICINE | Facility: CLINIC | Age: 64
End: 2024-11-22
Payer: MEDICARE

## 2024-11-22 VITALS
SYSTOLIC BLOOD PRESSURE: 131 MMHG | OXYGEN SATURATION: 99 % | RESPIRATION RATE: 20 BRPM | BODY MASS INDEX: 40.21 KG/M2 | DIASTOLIC BLOOD PRESSURE: 79 MMHG | HEIGHT: 68 IN | HEART RATE: 60 BPM | TEMPERATURE: 98 F | WEIGHT: 265.31 LBS

## 2024-11-22 DIAGNOSIS — E11.9 TYPE 2 DIABETES MELLITUS WITHOUT COMPLICATION, WITHOUT LONG-TERM CURRENT USE OF INSULIN: Primary | ICD-10-CM

## 2024-11-22 DIAGNOSIS — I10 PRIMARY HYPERTENSION: ICD-10-CM

## 2024-11-22 DIAGNOSIS — I25.10 MILD CAD: ICD-10-CM

## 2024-11-22 DIAGNOSIS — E66.01 MORBID OBESITY: ICD-10-CM

## 2024-11-22 DIAGNOSIS — Z78.9 INFLUENZA VACCINATION ORDERED: ICD-10-CM

## 2024-11-22 PROCEDURE — 99214 OFFICE O/P EST MOD 30 MIN: CPT | Mod: PBBFAC,PN | Performed by: NURSE PRACTITIONER

## 2024-11-22 RX ORDER — SEMAGLUTIDE 0.68 MG/ML
0.25 INJECTION, SOLUTION SUBCUTANEOUS
Qty: 3 ML | Refills: 1 | Status: SHIPPED | OUTPATIENT
Start: 2024-11-22

## 2024-11-22 NOTE — PROGRESS NOTES
[facility]  Aristeo Huitron DNP     Patient ID: [id number]     Chief Complaint: Follow-up (6 month f/u appointment. Lab review. No current c/o. )      HPI:     Fernanda Singh is a 64 y.o. female here today for follow up.      Health Maintenance         Date Due Completion Date    TETANUS VACCINE Never done ---    Shingles Vaccine (1 of 2) Never done ---    COVID-19 Vaccine (3 - Moderna risk series) 05/07/2021 4/9/2021    RSV Vaccine (Age 60+ and Pregnant patients) (1 - Risk 60-74 years 1-dose series) Never done ---    Foot Exam 04/26/2024 4/26/2023    Override on 5/11/2022: Done    Eye Exam 10/16/2024 10/16/2023    Override on 4/26/2023: Done    Mammogram 01/13/2025 1/13/2024    Hemoglobin A1c 05/21/2025 11/21/2024    Lipid Panel 08/23/2025 8/23/2024    Diabetes Urine Screening 11/21/2025 11/21/2024    High Dose Statin 11/22/2025 11/22/2024    Aspirin/Antiplatelet Therapy 11/22/2025 11/22/2024    Colorectal Cancer Screening 05/10/2029 5/10/2019            Past Medical History:   Diagnosis Date    Cancer 2018    CHF (congestive heart failure) 2012    Diabetes mellitus 2019    Disorder of kidney and ureter 2013    GERD (gastroesophageal reflux disease) 2013    Hypertension 2011    Sleep apnea 2013        Past Surgical History:   Procedure Laterality Date    ANGIOGRAM, CORONARY, WITH LEFT HEART CATHETERIZATION N/A 9/28/2022    Procedure: Angiogram, Coronary, with Left Heart Cath;  Surgeon: Milton Connolly MD;  Location: [facility] CATH LAB;  Service: Cardiology;  Laterality: N/A;    BILATERAL TUBAL LIGATION      Biopsy Gastrointestinal  11/13/2019    BIOPSY OF THYROID  07/2019    Catheter Inserrtion Mediport      [id number]    CERVICAL CONIZATION   W/ LASER  05/01/2018    COLONOSCOPY  05/10/2019    COLONOSCOPY  09/24/2014    DILATION AND CURETTAGE OF UTERUS  05/01/2018    ESOPHAGOGASTRODUODENOSCOPY  09/24/2014    ESOPHAGOGASTRODUODENOSCOPY  11/13/2019    Left breast small mass removal      Myomectomy  Hysteroscopic  05/01/2018    POLYPECTOMY  05/10/2019    POLYPECTOMY  09/24/2014    TUBAL LIGATION          Social History     Tobacco Use    Smoking status: Every Day     Current packs/day: 0.50     Average packs/day: 0.5 packs/day for 30.4 years (15.2 ttl pk-yrs)     Types: Cigarettes     Start date: 7/12/1994     Passive exposure: Past    Smokeless tobacco: Never   Substance and Sexual Activity    Alcohol use: Not Currently     Alcohol/week: 1.0 standard drink of alcohol     Types: 1 Glasses of wine per week     Comment: monthly    Drug use: Not Currently    Sexual activity: Yes     Partners: Male        Current Outpatient Medications   Medication Instructions    amLODIPine (NORVASC) 10 mg, Oral, Daily    aspirin (ECOTRIN) 81 mg, Oral, Daily    baclofen (LIORESAL) 10 mg, Oral, 3 times daily PRN    carvediloL (COREG) 25 mg, Oral, 2 times daily    dapagliflozin propanediol (FARXIGA) 5 mg, Oral, Daily    diclofenac (VOLTAREN) 75 mg, Oral, 2 times daily PRN    furosemide (LASIX) 40 mg, Oral, Daily    gabapentin (NEURONTIN) 600 mg, Oral, 3 times daily    hydrALAZINE (APRESOLINE) 50 mg, Oral, 2 times daily    LIDOcaine (LIDODERM) 5 % 1 patch, Transdermal, Daily PRN, Remove & Discard patch within 12 hours or as directed by MD    losartan (COZAAR) 25 mg, Oral, Daily    metFORMIN (GLUCOPHAGE) 500 mg, Oral, 2 times daily with meals    nitroGLYCERIN (NITROSTAT) 0.4 mg, Sublingual, Every 5 min PRN    ondansetron (ZOFRAN) 4 mg, Oral, Every 12 hours PRN    OZEMPIC 0.25 mg, Subcutaneous, Every 7 days    rosuvastatin (CRESTOR) 10 mg, Oral, Nightly       Review of patient's allergies indicates:   Allergen Reactions    Lisinopril Swelling     Other reaction(s): Angioedema of lips        Patient Care Team:  Aristeo Huitron FNP as PCP - General (Family Medicine)  Alayna Davenport LPN as Care Coordinator     Subjective:     Review of Systems   Constitutional:  Negative for appetite change, chills, diaphoresis and fever.   HENT:  " Negative for ear pain, sinus pain and sore throat.    Eyes:  Negative for pain and visual disturbance.   Respiratory:  Negative for cough, shortness of breath and wheezing.    Cardiovascular:  Negative for chest pain, palpitations and leg swelling.   Gastrointestinal:  Negative for abdominal pain, blood in stool, diarrhea, nausea and vomiting.   Endocrine: Negative for cold intolerance.   Genitourinary:  Negative for difficulty urinating, dysuria, frequency and hematuria.   Musculoskeletal:  Negative for arthralgias, joint swelling and myalgias.   Skin:  Negative for color change and rash.   Allergic/Immunologic: Negative.    Neurological: Negative.  Negative for dizziness, syncope, light-headedness and numbness.   Hematological: Negative.    Psychiatric/Behavioral: Negative.  Negative for dysphoric mood and suicidal ideas. The patient is not nervous/anxious.    All other systems reviewed and are negative.      12 point review of systems conducted, negative except as stated in the history of present illness. See HPI for details.    Objective:     Visit Vitals  /79 (BP Location: Left arm, Patient Position: Sitting)   Pulse 60   Temp 98 °F (36.7 °C) (Oral)   Resp 20   Ht 5' 8" (1.727 m)   Wt 120.3 kg (265 lb 4.8 oz)   SpO2 99%   BMI 40.34 kg/m²       Physical Exam  Vitals and nursing note reviewed.   Constitutional:       General: She is not in acute distress.     Appearance: She is not ill-appearing.   HENT:      Head: Normocephalic and atraumatic.      Mouth/Throat:      Mouth: Mucous membranes are moist.      Pharynx: Oropharynx is clear.   Eyes:      General: No scleral icterus.     Extraocular Movements: Extraocular movements intact.      Conjunctiva/sclera: Conjunctivae normal.      Pupils: Pupils are equal, round, and reactive to light.   Neck:      Vascular: No carotid bruit.   Cardiovascular:      Rate and Rhythm: Normal rate and regular rhythm.      Heart sounds: No murmur heard.     No friction rub. " No gallop.   Pulmonary:      Effort: Pulmonary effort is normal. No respiratory distress.      Breath sounds: Normal breath sounds. No wheezing, rhonchi or rales.   Abdominal:      General: Abdomen is flat. Bowel sounds are normal. There is no distension.      Palpations: Abdomen is soft. There is no mass.      Tenderness: There is no abdominal tenderness.   Musculoskeletal:         General: Normal range of motion.      Cervical back: Normal range of motion and neck supple.   Skin:     General: Skin is warm and dry.   Neurological:      General: No focal deficit present.      Mental Status: She is alert.   Psychiatric:         Mood and Affect: Mood normal.         Labs Reviewed:     Chemistry:  Lab Results   Component Value Date     11/21/2024    K 4.0 11/21/2024    BUN 11.7 11/21/2024    CREATININE 0.86 11/21/2024    EGFRNORACEVR >60 11/21/2024    GLUCOSE 115 11/21/2024    CALCIUM 9.8 11/21/2024    ALKPHOS 115 11/21/2024    LABPROT 7.7 (H) 11/21/2024    ALBUMIN 3.7 11/21/2024    BILIDIR 0.2 04/13/2022    IBILI 0.20 04/13/2022    AST 11 11/21/2024    ALT 11 11/21/2024    MG 1.60 09/29/2022    PHOS 4.7 09/29/2022    TSH 1.255 05/22/2024    FOGIYU0NTIZ 0.90 05/22/2024        Lab Results   Component Value Date    HGBA1C 5.3 11/21/2024        Hematology:  Lab Results   Component Value Date    WBC 7.54 11/21/2024    HGB 13.0 11/21/2024    HCT 39.9 11/21/2024     11/21/2024       Lipid Panel:  Lab Results   Component Value Date    CHOL 105 08/23/2024    HDL 42 08/23/2024    LDL 49.00 (L) 08/23/2024    TRIG 69 08/23/2024    TOTALCHOLEST 3 08/23/2024        Urine:  Lab Results   Component Value Date    APPEARANCEUA Clear 01/23/2024    SGUA 1.022 01/23/2024    PROTEINUA Trace (A) 01/23/2024    KETONESUA Negative 01/23/2024    LEUKOCYTESUR Negative 01/23/2024    RBCUA 0-5 01/23/2024    WBCUA 0-5 01/23/2024    BACTERIA None Seen 01/23/2024    SQEPUA Occ (A) 01/23/2024    HYALINECASTS None Seen 01/23/2024     HELEN 90.7 11/21/2024        Assessment:       ICD-10-CM ICD-9-CM   1. Type 2 diabetes mellitus without complication, without long-term current use of insulin  E11.9 250.00   2. Influenza vaccination ordered  Z78.9 V49.89   3. Primary hypertension  I10 401.9   4. Morbid obesity  E66.01 278.01   5. Mild CAD  I25.10 414.00        Plan:     1. Type 2 diabetes mellitus without complication, without long-term current use of insulin  Assessment & Plan:  Lab Results   Component Value Date    HGBA1C 5.3 11/21/2024    HGBA1C 5.6 05/22/2024    LDL 49.00 (L) 08/23/2024    CREATININE 0.86 11/21/2024      Diabetes Medications               dapagliflozin propanediol (FARXIGA) 5 mg Tab tablet Take 1 tablet (5 mg total) by mouth once daily.    metFORMIN (GLUCOPHAGE) 500 MG tablet Take 1 tablet (500 mg total) by mouth 2 (two) times daily with meals.    semaglutide (OZEMPIC) 0.25 mg or 0.5 mg (2 mg/3 mL) pen injector Inject 0.25 mg into the skin every 7 days.          She would to add Ozempic for hard of her diabetes management and for assistance with weight loss.  She is hoping to lose weight so that she would have prevention hernia repair.  She does not have a known contraindications to starting this therapy and precautions are advised.  On ACE and Statin according to ADA guidelines.  Discussed caution with SGLT2s + diuretics as concomitant use can cause volume depletion. Discussed caution with DPP-Ivs and HF risk.  Follow ADA Diet. Avoid soda, simple sweets, and limit rice/pasta/breads/starches (no more than 45-50 grams per meal).  Maintain healthy weight with goal BMI <30.  Exercise 5 times per week for 30 minutes per day.  Stressed importance of daily foot exams and to wear approved DM shoes.   Stressed importance of annual dilated eye exam.          Orders:  -     semaglutide (OZEMPIC) 0.25 mg or 0.5 mg (2 mg/3 mL) pen injector; Inject 0.25 mg into the skin every 7 days.  Dispense: 3 mL; Refill: 1    2. Influenza  vaccination ordered  -     influenza (Flulaval, Fluzone, Fluarix) 45 mcg/0.5 mL IM vaccine (> or = 6 mo) 0.5 mL    3. Primary hypertension  Overview:    Currently on Hydralazine 50mg BID, Carvedilol 25mg BID, Amlodipine 10mg every day, Losartan 25 mg po daily    Assessment & Plan:  Goal BP < 140/90, best goal is BP <130/80 consistently   Reduce the amount of salt in your diet, follow a 2 gm sodium, DASH diet daily            Lose weight if you are overweight or have obesity  Avoid drinking too much alcohol  Stop smoking  Exercise at least 30 minutes per day most days of the week        4. Morbid obesity  Assessment & Plan:  Discussed importance of healthy weight maintenance and disease prevention.  Advised of low carb, low-fat, low-cholesterol, good fat diet, and importance of portion control measures.  Discussed drinking plenty of water daily, getting plenty of sleep nightly, stress reduction, and addressing any underlying lifestyle habits or past/recent stressors that may be contributing to obesity.  Advised of importance of getting 20-30 minutes of aerobic activity and daily.        5. Mild CAD  Assessment & Plan:  She denies any exertional chest pain or shortness a breath.  Medically managed and following with Cardiology.           Follow up in about 3 months (around 2/22/2025) for follow up. In addition to their scheduled follow up, the patient has also been instructed to follow up on as needed basis.     Future Appointments   Date Time Provider Department Center   2/24/2025  8:00 AM Dari Dixon NP [facility]   2/24/2025  9:45 AM Stephanie Lassiter PA-C [facility] CARD [facility]    7/16/2025  8:15 AM [facility], [facility] GYN [facility] GYN [facility]        MARICHUY Buitrago

## 2024-11-22 NOTE — ASSESSMENT & PLAN NOTE
Lab Results   Component Value Date    HGBA1C 5.3 11/21/2024    HGBA1C 5.6 05/22/2024    LDL 49.00 (L) 08/23/2024    CREATININE 0.86 11/21/2024      Diabetes Medications               dapagliflozin propanediol (FARXIGA) 5 mg Tab tablet Take 1 tablet (5 mg total) by mouth once daily.    metFORMIN (GLUCOPHAGE) 500 MG tablet Take 1 tablet (500 mg total) by mouth 2 (two) times daily with meals.    semaglutide (OZEMPIC) 0.25 mg or 0.5 mg (2 mg/3 mL) pen injector Inject 0.25 mg into the skin every 7 days.          She would to add Ozempic for hard of her diabetes management and for assistance with weight loss.  She is hoping to lose weight so that she would have prevention hernia repair.  She does not have a known contraindications to starting this therapy and precautions are advised.  On ACE and Statin according to ADA guidelines.  Discussed caution with SGLT2s + diuretics as concomitant use can cause volume depletion. Discussed caution with DPP-Ivs and HF risk.  Follow ADA Diet. Avoid soda, simple sweets, and limit rice/pasta/breads/starches (no more than 45-50 grams per meal).  Maintain healthy weight with goal BMI <30.  Exercise 5 times per week for 30 minutes per day.  Stressed importance of daily foot exams and to wear approved DM shoes.   Stressed importance of annual dilated eye exam.

## 2025-01-06 ENCOUNTER — PATIENT MESSAGE (OUTPATIENT)
Dept: FAMILY MEDICINE | Facility: CLINIC | Age: 65
End: 2025-01-06
Payer: MEDICARE

## 2025-01-06 DIAGNOSIS — Z12.31 BREAST CANCER SCREENING BY MAMMOGRAM: Primary | ICD-10-CM

## 2025-01-16 ENCOUNTER — HOSPITAL ENCOUNTER (OUTPATIENT)
Dept: RADIOLOGY | Facility: HOSPITAL | Age: 65
Discharge: HOME | End: 2025-01-16
Attending: NURSE PRACTITIONER
Payer: MEDICARE

## 2025-01-16 DIAGNOSIS — Z12.31 BREAST CANCER SCREENING BY MAMMOGRAM: ICD-10-CM

## 2025-01-16 PROCEDURE — 77067 SCR MAMMO BI INCL CAD: CPT | Mod: 26,,, | Performed by: RADIOLOGY

## 2025-01-16 PROCEDURE — 77063 BREAST TOMOSYNTHESIS BI: CPT | Mod: 26,,, | Performed by: RADIOLOGY

## 2025-01-16 PROCEDURE — 77063 BREAST TOMOSYNTHESIS BI: CPT | Mod: TC

## 2025-01-28 DIAGNOSIS — E11.9 TYPE 2 DIABETES MELLITUS WITHOUT COMPLICATION, WITHOUT LONG-TERM CURRENT USE OF INSULIN: ICD-10-CM

## 2025-01-28 RX ORDER — DAPAGLIFLOZIN 5 MG/1
5 TABLET, FILM COATED ORAL DAILY
Qty: 90 TABLET | Refills: 1 | Status: SHIPPED | OUTPATIENT
Start: 2025-01-28

## 2025-02-03 DIAGNOSIS — I10 PRIMARY HYPERTENSION: ICD-10-CM

## 2025-02-03 RX ORDER — FUROSEMIDE 40 MG/1
40 TABLET ORAL DAILY
Qty: 90 TABLET | Refills: 3 | Status: SHIPPED | OUTPATIENT
Start: 2025-02-03 | End: 2026-02-03

## 2025-02-19 ENCOUNTER — OFFICE VISIT (OUTPATIENT)
Dept: CARDIOLOGY | Facility: CLINIC | Age: 65
End: 2025-02-19
Payer: MEDICARE

## 2025-02-19 VITALS
SYSTOLIC BLOOD PRESSURE: 138 MMHG | TEMPERATURE: 98 F | BODY MASS INDEX: 38.19 KG/M2 | HEIGHT: 68 IN | WEIGHT: 252 LBS | HEART RATE: 71 BPM | DIASTOLIC BLOOD PRESSURE: 80 MMHG | RESPIRATION RATE: 18 BRPM | OXYGEN SATURATION: 99 %

## 2025-02-19 DIAGNOSIS — E66.01 MORBID OBESITY: ICD-10-CM

## 2025-02-19 DIAGNOSIS — I50.9 CONGESTIVE HEART FAILURE, UNSPECIFIED HF CHRONICITY, UNSPECIFIED HEART FAILURE TYPE: ICD-10-CM

## 2025-02-19 DIAGNOSIS — I10 PRIMARY HYPERTENSION: ICD-10-CM

## 2025-02-19 DIAGNOSIS — I20.0 UNSTABLE ANGINA: Primary | ICD-10-CM

## 2025-02-19 DIAGNOSIS — I25.10 MILD CAD: ICD-10-CM

## 2025-02-19 DIAGNOSIS — R00.2 PALPITATIONS: ICD-10-CM

## 2025-02-19 DIAGNOSIS — Z72.0 TOBACCO USER: ICD-10-CM

## 2025-02-19 DIAGNOSIS — I50.32 CHRONIC HEART FAILURE WITH PRESERVED EJECTION FRACTION: ICD-10-CM

## 2025-02-19 DIAGNOSIS — E11.9 TYPE 2 DIABETES MELLITUS WITHOUT COMPLICATION, WITHOUT LONG-TERM CURRENT USE OF INSULIN: ICD-10-CM

## 2025-02-19 DIAGNOSIS — E78.5 HYPERLIPIDEMIA LDL GOAL <70: ICD-10-CM

## 2025-02-19 PROCEDURE — 99215 OFFICE O/P EST HI 40 MIN: CPT | Mod: PBBFAC

## 2025-02-19 NOTE — PATIENT INSTRUCTIONS
Complete FLP/CMP prior to next visit   Follow up in cardiology clinic in 6 months or sooner if needed   Follow up with PCP as directed   Please notify clinic if any new concerns or any change in symptoms

## 2025-02-19 NOTE — PROGRESS NOTES
CHIEF COMPLAINT:   No chief complaint on file.                                                 HPI:  Fernanda Singh 64 y.o. female  with a PMH significant for mild CAD, hypertension, GERD, CKD, chronic tobacco use, obesity, breast mass (2016), uterine CA (5/2017), and hysterectomy who presents to cardiology clinic for follow up and ongoing care.  At last office visit patient stated that she felt well from a cardiac standpoint.  Her only complaint at that time was some occasional lightheadedness and dizziness that she associated with dehydration.  Otherwise she denied any cardiac complaints.    Today the patient again states that she feels well from a cardiac standpoint.  She denies any chest pain, SOB, FERNANDES, palpitations, PND, orthopnea, lightheadedness, dizziness, syncope, lower extremity edema, or claudication symptoms.  She states that she is able to complete her ADLs without any issues or ischemic symptoms.  She has lost nearly 20 lb since her last office visit.  She states that she has been increasing the amount of physical activity that she does throughout the day and she is also trying to make some dietary changes as well.  She is not doing a lot of formal exercise but she states that she is moving around a lot more.  She is able to complete her ADLs and her job duties without any issues or ischemic symptoms.  She tries to follow a heart healthy diet as much as she can.  She reports compliance with her current medications and she is tolerating them well.  Blood pressure is improved on repeat check in clinic today, but she admits that she forgot to take her medications last night.  She continues to smoke approximately 1/2 pack of cigarettes per day but is trying to decrease that number and eventually achieve total cessation.                                                                                                                                                                                                        CARDIAC TESTIN Hour Holter 22  Underlying rhythm:   Sinus  Arrythmia:  No significant arrhythmia noted     Pauses:  No significant pause noted  Symptoms:  No diary returned    Events:  No patient marked events           Echocardiogram  Results for orders placed during the hospital encounter of 22  Echo  Interpretation Summary  · The left ventricle is normal in size with concentric hypertrophy and normal systolic function. The estimated ejection fraction is 60%.  · Normal right ventricular size with normal right ventricular systolic function.        Stress Test  Results for orders placed during the hospital encounter of 22  Nuclear Stress - Cardiology Interpreted  Interpretation Summary    Abnormal myocardial perfusion scan.    There is a moderate intensity, small sized, reversible perfusion abnormality that is consistent with ischemia in the distal anteroseptal and apical wall(s) in the typical distribution of the LAD territory.    There are no other significant perfusion abnormalities.    The gated perfusion images showed an ejection fraction of 59% at rest. The gated perfusion images showed an ejection fraction of 50% post stress.    There is normal wall motion at rest and post stress.    LV cavity size is normal at rest and normal at stress.    The EKG portion of this study is positive for ischemia.    The patient reported no chest pain during the stress test.        Coronary Angiogram  Results for orders placed during the hospital encounter of 22  Cardiac catheterization  Conclusion    The pre-procedure left ventricular end diastolic pressure was 5.    The estimated blood loss was none.    The coronary arteries were normal..  FINDINGS  LVEDP:  5 mmHg  The patient has No CAD  Blood loss:  less than 15 cc.  RECOMMENDATIONS  Medical management  Weight loss.  Blood pressure control.  Statin therapy.  Activity -- avoid straining with affected limbs for one week  Exercise  -- as tolerated  Precautions post D/C -- come to ER for hematoma, unusual pain, erythema, or unusual drainage at access site  Precautions post D/C -- if develop bleeding or hematoma at access site, hold pressure at access site, and come to ER  POST-CATH GUIDELINE FOR INPATIENT DISCHARGE  No hematoma or swelling at access site  No unusual tenderness at access site  Adequate distal pulse or capillary refill in limb(s) accessed  No unusual difficulty with ambulation after bed rest is over        48-hour Holter monitor February 2021:  The patient was in sinus rhythm with an average heart rate of 77 bpm. Heart rates greater than 120 bpm were noted less than 1% of the time. No episodes of bradycardia was noted. Fine less than 1% of beats were due to PVCs. Short burst of V run lasting for 4 beats at 200 bpm.  Occasional PACs. Episode of SVT lasting for 8 beats at 148 bpm.  No diary was returned.    Echo 2/20  Mild concentric left ventricular hypertrophy.Left ventricular ejection fraction is measured at approximately 50 to 55 %.    Holter (48hr)-11/2016 : Patient was in normal sinus rhythm throughout. Supraventricular ectopic beats consisted of very rare premature beats. Ventricular ectopic beats consisted of very rare unifocal beats.    Left heart cath on September 19, 2016:  Mild CAD  Hypertensive disease  Normal LVEF 60%  Recommendations: Medical management and aggressive risk factor management    ECHO (2015)  EF >55%  Patient Active Problem List   Diagnosis    Anxiety    CHF (congestive heart failure)    Degenerative disc disease, lumbar    Endometrial cancer    Gastroesophageal reflux disease    Primary hypertension    Morbid obesity    Peripheral neuropathy due to and not concurrent with chemotherapy    Diabetes    Tobacco user    Multinodular thyroid    Uterine fibroid    Palpitations    Hyperlipidemia LDL goal <70    Mild CAD    Unstable angina    Chronic heart failure with preserved ejection fraction    Foot callus     Central abdominal mass     Past Surgical History:   Procedure Laterality Date    ANGIOGRAM, CORONARY, WITH LEFT HEART CATHETERIZATION N/A 9/28/2022    Procedure: Angiogram, Coronary, with Left Heart Cath;  Surgeon: Milton Connolly MD;  Location: [facility] CATH LAB;  Service: Cardiology;  Laterality: N/A;    BILATERAL TUBAL LIGATION      Biopsy Gastrointestinal  11/13/2019    BIOPSY OF THYROID  07/2019    Catheter Inserrtion Mediport      [id number]    CERVICAL CONIZATION   W/ LASER  05/01/2018    COLONOSCOPY  05/10/2019    COLONOSCOPY  09/24/2014    DILATION AND CURETTAGE OF UTERUS  05/01/2018    ESOPHAGOGASTRODUODENOSCOPY  09/24/2014    ESOPHAGOGASTRODUODENOSCOPY  11/13/2019    Left breast small mass removal      Myomectomy Hysteroscopic  05/01/2018    POLYPECTOMY  05/10/2019    POLYPECTOMY  09/24/2014    TUBAL LIGATION       Social History     Socioeconomic History    Marital status:      Spouse name: Jude    Number of children: 3    Highest education level: Associate degree: occupational, technical, or vocational program   Occupational History    Occupation: unemployed   Tobacco Use    Smoking status: Every Day     Current packs/day: 0.50     Average packs/day: 0.5 packs/day for 30.6 years (15.3 ttl pk-yrs)     Types: Cigarettes     Start date: 7/12/1994     Passive exposure: Past    Smokeless tobacco: Never   Substance and Sexual Activity    Alcohol use: Not Currently     Alcohol/week: 1.0 standard drink of alcohol     Types: 1 Glasses of wine per week     Comment: monthly    Drug use: Not Currently    Sexual activity: Yes     Partners: Male     Social Drivers of Health     Financial Resource Strain: Low Risk  (12/7/2022)    Overall Financial Resource Strain (CARDIA)     Difficulty of Paying Living Expenses: Not hard at all   Recent Concern: Financial Resource Strain - Medium Risk (9/28/2022)    Overall Financial Resource Strain (CARDIA)     Difficulty of Paying Living Expenses: Somewhat hard   Food  Insecurity: No Food Insecurity (12/7/2022)    Hunger Vital Sign     Worried About Running Out of Food in the Last Year: Never true     Ran Out of Food in the Last Year: Never true   Transportation Needs: No Transportation Needs (12/7/2022)    PRAPARE - Transportation     Lack of Transportation (Medical): No     Lack of Transportation (Non-Medical): No   Physical Activity: Inactive (12/7/2022)    Exercise Vital Sign     Days of Exercise per Week: 0 days     Minutes of Exercise per Session: 0 min   Stress: No Stress Concern Present (12/7/2022)    [employer] [place] [employer] - Occupational Stress Questionnaire     Feeling of Stress : Not at all   Housing Stability: High Risk (12/7/2022)    Housing Stability Vital Sign     Unable to Pay for Housing in the Last Year: Yes     Number of Places Lived in the Last Year: 1     Unstable Housing in the Last Year: No        Family History   Problem Relation Name Age of Onset    Hypertension Mother Mady Andersen     Heart failure Mother Mady Andersen     Kidney failure Mother Mady Andersen     Heart attack Father Leroy Maher     Hypertension Father Leroy Maher     Diabetes Father Leroy Maher     Leukemia Brother Vignesh     Cirrhosis Brother Hay     Hypertension Brother Kofi     Esophageal cancer Brother Kofi      Review of patient's allergies indicates:   Allergen Reactions    Lisinopril Swelling     Other reaction(s): Angioedema of lips         ROS:  Review of Systems   Constitutional: Negative.    HENT: Negative.     Eyes: Negative.    Respiratory: Negative.  Negative for shortness of breath.    Cardiovascular: Negative.  Negative for chest pain, palpitations, orthopnea, claudication, leg swelling and PND.   Gastrointestinal:  Negative for abdominal pain.   Genitourinary: Negative.    Musculoskeletal:  Positive for back pain.   Skin: Negative.    Neurological: Negative.  Negative for dizziness and weakness.   Endo/Heme/Allergies:  "Negative.    Psychiatric/Behavioral: Negative.                                                                                                                                                                                  Negative except as stated in the history of present illness. See HPI for details.    PHYSICAL EXAM:  Visit Vitals  BP (!) 150/80   Pulse 71   Temp 98.2 °F (36.8 °C) (Oral)   Resp 18   Ht 5' 8" (1.727 m)   Wt 114.3 kg (252 lb)   SpO2 99%   BMI 38.32 kg/m²         Physical Exam  Constitutional:       Appearance: Normal appearance. She is obese. She is not ill-appearing.   HENT:      Head: Normocephalic.      Nose: Nose normal.   Eyes:      Extraocular Movements: Extraocular movements intact.   Neck:      Vascular: No carotid bruit.   Cardiovascular:      Rate and Rhythm: Normal rate and regular rhythm.      Pulses: Normal pulses.      Heart sounds: Normal heart sounds. No murmur heard.  Pulmonary:      Effort: Pulmonary effort is normal. No respiratory distress.   Abdominal:      General: There is no distension.   Musculoskeletal:         General: Normal range of motion.      Cervical back: Normal range of motion.      Right lower leg: No edema.      Left lower leg: No edema.   Skin:     General: Skin is warm.   Neurological:      General: No focal deficit present.      Mental Status: She is alert.   Psychiatric:         Mood and Affect: Mood normal.         Current Outpatient Medications   Medication Instructions    amLODIPine (NORVASC) 10 mg, Oral, Daily    aspirin (ECOTRIN) 81 mg, Oral, Daily    baclofen (LIORESAL) 10 mg, Oral, 3 times daily PRN    carvediloL (COREG) 25 mg, Oral, 2 times daily    dapagliflozin propanediol (FARXIGA) 5 mg, Oral, Daily    diclofenac (VOLTAREN) 75 mg, Oral, 2 times daily PRN    furosemide (LASIX) 40 mg, Oral, Daily    gabapentin (NEURONTIN) 600 mg, Oral, 3 times daily    hydrALAZINE (APRESOLINE) 50 mg, Oral, 2 times daily    LIDOcaine (LIDODERM) 5 % 1 patch, " Transdermal, Daily PRN, Remove & Discard patch within 12 hours or as directed by MD    losartan (COZAAR) 25 mg, Oral, Daily    metFORMIN (GLUCOPHAGE) 500 mg, Oral, 2 times daily with meals    nitroGLYCERIN (NITROSTAT) 0.4 mg, Sublingual, Every 5 min PRN    ondansetron (ZOFRAN) 4 mg, Oral, Every 12 hours PRN    OZEMPIC 0.25 mg, Subcutaneous, Every 7 days    rosuvastatin (CRESTOR) 10 mg, Oral, Nightly        All medications, laboratory studies, cardiac diagnostic imaging reviewed.     Lab Results   Component Value Date    LDL 49.00 (L) 08/23/2024    LDL 45.00 (L) 07/20/2023    TRIG 69 08/23/2024    TRIG 73 07/20/2023    CREATININE 0.86 11/21/2024    MG 1.60 09/29/2022    K 4.0 11/21/2024        ASSESSMENT/PLAN:  Mild CAD per [facility] in 2016      - CAD is quiescent she is asymptomatic and denies any anginal or anginal equivalent symptoms today - see HPI       - S/p [facility] on 9.28.22 which revealed normal coronary arteries       - Continue aspirin, coreg, and crestor      - Counseled on heart healthy diet, exercise, and smoking cessation    HFpEF      - Stable from last visit- asymptomatic- see HPI       - Denies any heart failure symptoms such as shortness of breath or lower extremity edema or other congestive symptoms      - EF 60% per Echo 9.26.22      - Continue Lasix      - Counseled on BP control and diet and exercise     HTN (hypertension)       - /80 on repeat check       - Continue Amlodipine, Coreg, hydralazine, Losartan, and Lasix      - Counseled on low salt diet and exercise as tolerated     Palpitations      - Much improved from prior - no symptoms since last visit       - Asymptomatic-Denies any recent palpitations, lightheadedness, dizziness, fluttering, or tachycardic symptoms      - 48 hour Holter monitor February 2021 - revealed she was in sinus rhythm with an average heart rate of 77 bpm. See report under HPI.       - Repeat Holter December 2022 was WNL     HLD      - LDL at goal - 49 per labs  8.23.24      - Continue Crestor 10mg daily       - Repeat FLP/CMP prior to next visit - approx July 2025       - Counseled on low cholesterol diet and exercise as tolerated     DM      - A1C at goal - 5.3      - Management per PCP    Tobacco use      - Counseled on smoking cessation      - She currently smokes up to 1/2 PPD      Complete FLP/CMP prior to next visit   Follow up in cardiology clinic in 6 months or sooner if needed   Follow up with PCP as directed   Please notify clinic if any new concerns or any change in symptoms

## 2025-02-24 ENCOUNTER — OFFICE VISIT (OUTPATIENT)
Dept: INTERNAL MEDICINE | Facility: CLINIC | Age: 65
End: 2025-02-24
Payer: MEDICARE

## 2025-02-24 ENCOUNTER — CLINICAL SUPPORT (OUTPATIENT)
Dept: INTERNAL MEDICINE | Facility: CLINIC | Age: 65
End: 2025-02-24
Attending: NURSE PRACTITIONER
Payer: MEDICARE

## 2025-02-24 VITALS
BODY MASS INDEX: 38.85 KG/M2 | DIASTOLIC BLOOD PRESSURE: 66 MMHG | SYSTOLIC BLOOD PRESSURE: 128 MMHG | TEMPERATURE: 98 F | OXYGEN SATURATION: 97 % | RESPIRATION RATE: 18 BRPM | HEIGHT: 68 IN | HEART RATE: 58 BPM | WEIGHT: 256.31 LBS

## 2025-02-24 DIAGNOSIS — M54.50 CHRONIC BILATERAL LOW BACK PAIN WITHOUT SCIATICA: ICD-10-CM

## 2025-02-24 DIAGNOSIS — G89.29 CHRONIC BILATERAL LOW BACK PAIN WITHOUT SCIATICA: ICD-10-CM

## 2025-02-24 DIAGNOSIS — E78.5 HYPERLIPIDEMIA LDL GOAL <70: ICD-10-CM

## 2025-02-24 DIAGNOSIS — G62.0 PERIPHERAL NEUROPATHY DUE TO AND NOT CONCURRENT WITH CHEMOTHERAPY: ICD-10-CM

## 2025-02-24 DIAGNOSIS — E66.01 MORBID OBESITY: ICD-10-CM

## 2025-02-24 DIAGNOSIS — E11.9 TYPE 2 DIABETES MELLITUS WITHOUT COMPLICATION, WITHOUT LONG-TERM CURRENT USE OF INSULIN: ICD-10-CM

## 2025-02-24 DIAGNOSIS — T45.1X5S PERIPHERAL NEUROPATHY DUE TO AND NOT CONCURRENT WITH CHEMOTHERAPY: ICD-10-CM

## 2025-02-24 DIAGNOSIS — R00.2 PALPITATIONS: ICD-10-CM

## 2025-02-24 DIAGNOSIS — M51.362 DEGENERATION OF INTERVERTEBRAL DISC OF LUMBAR REGION WITH DISCOGENIC BACK PAIN AND LOWER EXTREMITY PAIN: Primary | ICD-10-CM

## 2025-02-24 DIAGNOSIS — I50.9 CONGESTIVE HEART FAILURE, UNSPECIFIED HF CHRONICITY, UNSPECIFIED HEART FAILURE TYPE: ICD-10-CM

## 2025-02-24 DIAGNOSIS — I10 PRIMARY HYPERTENSION: ICD-10-CM

## 2025-02-24 DIAGNOSIS — K59.09 OTHER CONSTIPATION: ICD-10-CM

## 2025-02-24 DIAGNOSIS — F17.210 CIGARETTE NICOTINE DEPENDENCE WITHOUT COMPLICATION: ICD-10-CM

## 2025-02-24 PROCEDURE — 1159F MED LIST DOCD IN RCRD: CPT | Mod: CPTII,,, | Performed by: NURSE PRACTITIONER

## 2025-02-24 PROCEDURE — 3078F DIAST BP <80 MM HG: CPT | Mod: CPTII,,, | Performed by: NURSE PRACTITIONER

## 2025-02-24 PROCEDURE — 3008F BODY MASS INDEX DOCD: CPT | Mod: CPTII,,, | Performed by: NURSE PRACTITIONER

## 2025-02-24 PROCEDURE — 3074F SYST BP LT 130 MM HG: CPT | Mod: CPTII,,, | Performed by: NURSE PRACTITIONER

## 2025-02-24 PROCEDURE — 92228 IMG RTA DETC/MNTR DS PHY/QHP: CPT | Mod: PBBFAC

## 2025-02-24 PROCEDURE — 92228 IMG RTA DETC/MNTR DS PHY/QHP: CPT | Mod: TC,PBBFAC | Performed by: FAMILY MEDICINE

## 2025-02-24 PROCEDURE — 99215 OFFICE O/P EST HI 40 MIN: CPT | Mod: S$PBB,,, | Performed by: NURSE PRACTITIONER

## 2025-02-24 PROCEDURE — 99214 OFFICE O/P EST MOD 30 MIN: CPT | Mod: PBBFAC | Performed by: NURSE PRACTITIONER

## 2025-02-24 PROCEDURE — 4010F ACE/ARB THERAPY RXD/TAKEN: CPT | Mod: CPTII,,, | Performed by: NURSE PRACTITIONER

## 2025-02-24 RX ORDER — BACLOFEN 10 MG/1
10 TABLET ORAL 3 TIMES DAILY PRN
Qty: 30 TABLET | Refills: 1 | Status: SHIPPED | OUTPATIENT
Start: 2025-02-24 | End: 2025-03-06

## 2025-02-24 RX ORDER — SEMAGLUTIDE 1.34 MG/ML
1 INJECTION, SOLUTION SUBCUTANEOUS
Qty: 3 ML | Refills: 3 | Status: SHIPPED | OUTPATIENT
Start: 2025-02-24 | End: 2026-02-24

## 2025-02-24 RX ORDER — DAPAGLIFLOZIN 5 MG/1
5 TABLET, FILM COATED ORAL DAILY
Qty: 90 TABLET | Refills: 1 | Status: SHIPPED | OUTPATIENT
Start: 2025-02-24

## 2025-02-24 NOTE — PROGRESS NOTES
Fernanda Singh is a 64 y.o. female here for a diabetic eye screening with non-dilated fundus photos per Dari Dixon NP.    Patient cooperative?: Yes  Small pupils?: No  Last eye exam: unknown    For exam results, see Encounter Report.

## 2025-02-24 NOTE — ASSESSMENT & PLAN NOTE
Prior XR/ MRI lumbar spine reviewed with pt.   Overall without worsening condition. Symptoms are chronic in nature. Not currently taking Rx for pain relief. May use OTC Tylenol and Rx Baclofen refilled. Also on Gabapentin.   Previously seen by neuro/ pain mgmt provider per pt. Previously completed PT.

## 2025-02-24 NOTE — ASSESSMENT & PLAN NOTE
BMI 38.98, wt loss 10# since 11/2024- started on Ozempic  Educated on increased risk of disease s/t obesity.  Educated on health benefits of at least 5 days/ week of 30 minutes moderate intensity exercise (brisk walking) and 2 or more days/ week of muscle strength activities (as tolerated).  Eat well balanced diet of fresh fruits/ vegetables, whole grains, lean meats and limit high carbohydrate foods.

## 2025-02-24 NOTE — PROGRESS NOTES
Dari L Zack, NP   [facility]  [address] [facility] [address]      PATIENT NAME: Fernanda Singh  : 1960  DATE: 25  MRN: [id number]        History of Present Illness / Problem Focused Workflow     Fernanda Singh presents to the clinic with Establish Care, Diabetes, Back Pain, and Hypertension (States back pain is coming back full force, denies any recent injury.)     65 yo female to establish pcp care. Former patient of JOSE EDUARDO Huitron NP, last visit 24. PMH endometrial cancer (May 2018; chemo/ radiation- completed treatment with chemo in 2019), MP placement. Active diagnosis includes CAD, HFpEF, palpitations, HTN, HLD, DM, lumbar DDD, Neuropathy, obesity, tobacco user. She is c/o chronic back pain with b/l leg numbness/ tingling present for many years feels like it is coming back again over the last few months. She denies any recent injury/ trauma/ lifting, accidents. Previously seen by Neuro/ pain mgmt per pt. Previously completed PT years ago. Denies any worsening of symptoms. Not currently taking any Rx.   Due for diabetic eye/ foot exam. Due for colonoscopy. Started on Ozempic 2024 with wt loss 10# since last visit. Has noticed constipation. Denies tx. Denies hypoglycemia episodes. Needs med refills. /66. Denies any weakness, syncope, SOB, CP, palpitations, abd pain, n/v, hematochezia, hematuria, vag bleeding.     Other providers  [facility]   [facility]  [facility] G[facility]/ Oncology/ GYN- [place]     Review of Systems     Review of Systems   Constitutional: Negative.    HENT: Negative.     Eyes: Negative.    Respiratory: Negative.     Cardiovascular: Negative.    Gastrointestinal:  Positive for constipation.   Endocrine: Negative.    Genitourinary: Negative.    Musculoskeletal:  Positive for back pain.   Skin: Negative.    Allergic/Immunologic: Negative.    Neurological: Negative.     Hematological: Negative.    Psychiatric/Behavioral: Negative.         Medical / Social / Family History     -------------------------------------    Cancer    CHF (congestive heart failure)    Diabetes mellitus    Disorder of kidney and ureter    GERD (gastroesophageal reflux disease)    Hypertension    Sleep apnea        Past Surgical History:   Procedure Laterality Date    ANGIOGRAM, CORONARY, WITH LEFT HEART CATHETERIZATION N/A 9/28/2022    Procedure: Angiogram, Coronary, with Left Heart Cath;  Surgeon: Milton Connolly MD;  Location: [facility] CATH LAB;  Service: Cardiology;  Laterality: N/A;    BILATERAL TUBAL LIGATION      Biopsy Gastrointestinal  11/13/2019    BIOPSY OF THYROID  07/2019    Catheter Inserrtion Mediport      [id number]    CERVICAL CONIZATION   W/ LASER  05/01/2018    COLONOSCOPY  05/10/2019    COLONOSCOPY  09/24/2014    DILATION AND CURETTAGE OF UTERUS  05/01/2018    ESOPHAGOGASTRODUODENOSCOPY  09/24/2014    ESOPHAGOGASTRODUODENOSCOPY  11/13/2019    Left breast small mass removal      Myomectomy Hysteroscopic  05/01/2018    POLYPECTOMY  05/10/2019    POLYPECTOMY  09/24/2014    TUBAL LIGATION         Social History[1]     Family History   Problem Relation Name Age of Onset    Hypertension Mother Mady Andersen     Heart failure Mother Mady Andersen     Kidney failure Mother Mady Andersen     Heart attack Father Leroy Maher     Hypertension Father Leroy Maher     Diabetes Father Leroy Maher     Leukemia Brother Vignesh     Cirrhosis Brother Hay     Hypertension Brother Reliance     Esophageal cancer Brother Reliance         Medications and Allergies     Medications  Current Outpatient Medications   Medication Instructions    amLODIPine (NORVASC) 10 mg, Oral, Daily    aspirin (ECOTRIN) 81 mg, Oral, Daily    baclofen (LIORESAL) 10 mg, Oral, 3 times daily PRN    carvediloL (COREG) 25 mg, Oral, 2 times daily    dapagliflozin propanediol (FARXIGA) 5 mg, Oral, Daily    diclofenac  "(VOLTAREN) 75 mg, Oral, 2 times daily PRN    furosemide (LASIX) 40 mg, Oral, Daily    gabapentin (NEURONTIN) 600 mg, Oral, 3 times daily    hydrALAZINE (APRESOLINE) 50 mg, Oral, 2 times daily    losartan (COZAAR) 25 mg, Oral, Daily    metFORMIN (GLUCOPHAGE) 500 mg, Oral, 2 times daily with meals    nitroGLYCERIN (NITROSTAT) 0.4 mg, Sublingual, Every 5 min PRN    ondansetron (ZOFRAN) 4 mg, Oral, Every 12 hours PRN    OZEMPIC 1 mg, Subcutaneous, Every 7 days    rosuvastatin (CRESTOR) 10 mg, Oral, Nightly       Allergies  Review of patient's allergies indicates:   Allergen Reactions    Lisinopril Swelling     Other reaction(s): Angioedema of lips       Physical Examination     Visit Vitals  /66 (BP Location: Right arm, Patient Position: Sitting)   Pulse (!) 58   Temp 98.3 °F (36.8 °C) (Oral)   Resp 18   Ht 5' 7.99" (1.727 m)   Wt 116.3 kg (256 lb 4.8 oz)   SpO2 97%   BMI 38.98 kg/m²       Physical Exam  Constitutional:       General: She is not in acute distress.     Appearance: Normal appearance. She is not ill-appearing or diaphoretic.   HENT:      Head: Normocephalic.   Neck:      Thyroid: Thyromegaly present. No thyroid mass or thyroid tenderness.   Cardiovascular:      Rate and Rhythm: Normal rate and regular rhythm.      Pulses:           Dorsalis pedis pulses are 2+ on the right side and 2+ on the left side.        Posterior tibial pulses are 2+ on the right side and 2+ on the left side.      Heart sounds: No murmur heard.  Pulmonary:      Effort: Pulmonary effort is normal. No respiratory distress.      Breath sounds: Normal breath sounds. No stridor. No wheezing, rhonchi or rales.   Abdominal:      General: Bowel sounds are normal. There is no distension.      Palpations: Abdomen is soft. There is no mass.      Tenderness: There is no abdominal tenderness. There is no guarding.   Feet:      Right foot:      Protective Sensation: 5 sites tested.  5 sites sensed.      Skin integrity: Skin integrity normal. "      Toenail Condition: Right toenails are normal.      Left foot:      Protective Sensation: 5 sites tested.  5 sites sensed.      Skin integrity: Skin integrity normal.      Toenail Condition: Left toenails are normal.   Lymphadenopathy:      Cervical: No cervical adenopathy.   Skin:     General: Skin is warm and dry.   Neurological:      Mental Status: She is alert and oriented to person, place, and time. Mental status is at baseline.      Coordination: Coordination normal.      Gait: Gait normal.   Psychiatric:         Mood and Affect: Mood normal.         Behavior: Behavior normal.         Thought Content: Thought content normal.         Judgment: Judgment normal.           Results     Lab Results   Component Value Date    WBC 7.54 11/21/2024    RBC 5.07 11/21/2024    HGB 13.0 11/21/2024    HCT 39.9 11/21/2024    MCV 78.7 (L) 11/21/2024    MCH 25.6 (L) 11/21/2024    MCHC 32.6 (L) 11/21/2024    RDW 16.3 11/21/2024     11/21/2024    MPV 10.4 11/21/2024     Sodium   Date Value Ref Range Status   11/21/2024 141 136 - 145 mmol/L Final     Potassium   Date Value Ref Range Status   11/21/2024 4.0 3.5 - 5.1 mmol/L Final     Chloride   Date Value Ref Range Status   11/21/2024 108 (H) 98 - 107 mmol/L Final     CO2   Date Value Ref Range Status   11/21/2024 25 23 - 31 mmol/L Final     Blood Urea Nitrogen   Date Value Ref Range Status   11/21/2024 11.7 9.8 - 20.1 mg/dL Final     Creatinine   Date Value Ref Range Status   11/21/2024 0.86 0.55 - 1.02 mg/dL Final     Calcium   Date Value Ref Range Status   11/21/2024 9.8 8.4 - 10.2 mg/dL Final     Albumin   Date Value Ref Range Status   11/21/2024 3.7 3.4 - 4.8 g/dL Final     Bilirubin Total   Date Value Ref Range Status   11/21/2024 0.3 <=1.5 mg/dL Final     ALP   Date Value Ref Range Status   11/21/2024 115 40 - 150 unit/L Final     AST   Date Value Ref Range Status   11/21/2024 11 5 - 34 unit/L Final     ALT   Date Value Ref Range Status   11/21/2024 11 0 - 55  unit/L Final     Estimated GFR-Non    Date Value Ref Range Status   04/13/2022 84 >=90      Lab Results   Component Value Date    CHOL 105 08/23/2024     Lab Results   Component Value Date    HDL 42 08/23/2024     Lab Results   Component Value Date    TRIG 69 08/23/2024     Lab Results   Component Value Date    LDL 49.00 (L) 08/23/2024     Lab Results   Component Value Date    TSH 1.255 05/22/2024     Lab Results   Component Value Date    PHUR 5.5 01/23/2024    PROTEINUA Trace (A) 01/23/2024    GLUCUA 2+ (A) 01/23/2024    KETONESU Negative 04/13/2022    OCCULTUA Negative 01/23/2024    NITRITE Negative 01/23/2024    LEUKOCYTESUR Negative 01/23/2024     Lab Results   Component Value Date    HGBA1C 5.3 11/21/2024    HGBA1C 5.6 05/22/2024    HGBA1C 5.8 01/23/2024     Lab Results   Component Value Date    MICALBCREAT 10.6 11/21/2024        Assessment       ICD-10-CM ICD-9-CM   1. Degeneration of intervertebral disc of lumbar region with discogenic back pain and lower extremity pain  M51.362 722.52   2. Peripheral neuropathy due to and not concurrent with chemotherapy  G62.0 357.6    T45.1X5S 909.5   3. Congestive heart failure, unspecified HF chronicity, unspecified heart failure type  I50.9 428.0   4. Hyperlipidemia LDL goal <70  E78.5 272.4   5. Type 2 diabetes mellitus without complication, without long-term current use of insulin  E11.9 250.00   6. Cigarette nicotine dependence without complication  F17.210 305.1   7. Chronic bilateral low back pain without sciatica  M54.50 724.2    G89.29 338.29   8. Other constipation  K59.09 564.09   9. Palpitations  R00.2 785.1   10. Primary hypertension  I10 401.9   11. Morbid obesity  E66.01 278.01   12. Body mass index (BMI) 38.0-38.9, adult  Z68.38 V85.38       Plan       Problem List Items Addressed This Visit          Neuro    Degenerative disc disease, lumbar - Primary    Current Assessment & Plan   Prior XR/ MRI lumbar spine reviewed with pt.   Overall  without worsening condition. Symptoms are chronic in nature. Not currently taking Rx for pain relief. May use OTC Tylenol and Rx Baclofen refilled. Also on Gabapentin.   Previously seen by neuro/ pain mgmt provider per pt. Previously completed PT.         Relevant Medications    baclofen (LIORESAL) 10 MG tablet    Peripheral neuropathy due to and not concurrent with chemotherapy    Overview   Chemo-induced              Cardiac/Vascular    CHF (congestive heart failure)    Overview   Managed by [facility] in [place], [place].         Current Assessment & Plan   Asymptomatic. Daily weights. LSD. Fluid restrictions. Continue medications. Keep f/u with Cardiologist as scheduled.            Relevant Orders    CBC Auto Differential    Comprehensive Metabolic Panel    Hemoglobin A1C    TSH    Vitamin D    Hyperlipidemia LDL goal <70    Current Assessment & Plan   Lab Results   Component Value Date    CHOL 105 08/23/2024     Lab Results   Component Value Date    HDL 42 08/23/2024     Lab Results   Component Value Date    TRIG 69 08/23/2024     Lab Results   Component Value Date    LDL 49.00 (L) 08/23/2024     Avoid tobacco/ alcohol  Follow low fat/low cholesterol diet such as avoid/ decrease hwang, sausage, fried foods, cookies, cakes, chips, cheese, whole milk, butter, mayonnaise. Add olive oil, avocados, lean meats, fresh fruits/ vegetables, heart healthy nuts to diet.  Educated on health benefits of exercise 5 days/ week of at least 30 minutes moderate intensity exercise (brisk walking) and 2 days/ week of muscle strength activities  Daily ASA 81 mg for CV prevention  Continue current medication regimen           Relevant Orders    CBC Auto Differential    Comprehensive Metabolic Panel    Hemoglobin A1C    TSH    Vitamin D    Palpitations    Current Assessment & Plan   Denies. Asymptomatic.          Primary hypertension    Overview     Currently on Hydralazine 50mg BID, Carvedilol 25mg BID, Amlodipine 10mg every day,  "Losartan 25 mg po daily         Current Assessment & Plan   Lab Results   Component Value Date    CHOL 105 08/23/2024     Lab Results   Component Value Date    HDL 42 08/23/2024     Lab Results   Component Value Date    TRIG 69 08/23/2024     Lab Results   Component Value Date    LDL 49.00 (L) 08/23/2024     Avoid tobacco/ alcohol  Follow low fat/low cholesterol diet such as avoid/ decrease hwang, sausage, fried foods, cookies, cakes, chips, cheese, whole milk, butter, mayonnaise. Add olive oil, avocados, lean meats, fresh fruits/ vegetables, heart healthy nuts to diet.  Educated on health benefits of exercise 5 days/ week of at least 30 minutes moderate intensity exercise (brisk walking) and 2 days/ week of muscle strength activities  Daily ASA 81 mg for CV prevention  Continue current medication regimen           Relevant Orders    CBC Auto Differential    Comprehensive Metabolic Panel    Hemoglobin A1C    TSH    Vitamin D       Endocrine    Diabetes    Current Assessment & Plan   Lab Results   Component Value Date    HGBA1C 5.3 11/21/2024     No results found for: "VHBENUX7T"  CBGs: 80-100s  Hypoglycemia episodes: denies   Microalbumin:   Lab Results   Component Value Date    MICALBCREAT 10.6 11/21/2024     Educated on ADA diet: eliminate/decrease high carbohydrate foods (rice, pasta, bread, potatoes (french fries, chips), candy, sweets, fruit juices, canned fruit, junk food, crackers, carbonated beverages)  Educated on health benefits of at least 5 days/ week of 30 minutes moderate intensity exercise (brisk walking) and 2 or more days/ week of muscle strength activities  Avoid alcohol or tobacco use  Eye exam: fundus photos 2/24/25  Foot exam: 2/24/25  Per recommendations, continue with ACEI/ARB, Daily ASA 81 mg for CV prevention, Statin therapy  Continue with current regimen with increase of Ozempic to 1 mg q7d           Relevant Medications    semaglutide (OZEMPIC) 1 mg/dose (4 mg/3 mL)    dapagliflozin " propanediol (FARXIGA) 5 mg Tab tablet    Other Relevant Orders    Diabetic Eye Screening Photo    CBC Auto Differential    Comprehensive Metabolic Panel    Hemoglobin A1C    TSH    Vitamin D    Morbid obesity    Current Assessment & Plan   BMI 38.98, wt loss 10# since 11/2024- started on Ozempic  Educated on increased risk of disease s/t obesity.  Educated on health benefits of at least 5 days/ week of 30 minutes moderate intensity exercise (brisk walking) and 2 or more days/ week of muscle strength activities (as tolerated).  Eat well balanced diet of fresh fruits/ vegetables, whole grains, lean meats and limit high carbohydrate foods.            Relevant Orders    CBC Auto Differential    Comprehensive Metabolic Panel    Hemoglobin A1C    TSH    Vitamin D       GI    Other constipation    Current Assessment & Plan   Educated on slowly increasing fiber in diet to include fresh fruits and vegetables (squash, cabbage, lettuce, other greens, beans, and peas), whole grains and oats, nuts, and importance of adequate water intake (6-8 glasses of fluids daily).  Educated on health benefits of at least 5 days/ week of 30 minutes moderate intensity exercise (brisk walking) and 2 or more days/ week of muscle strength activities.  Daily Miralax with 6-8 oz water/juice or stool softener              Other    Cigarette nicotine dependence without complication    Current Assessment & Plan   0.5 ppd  Discussed for at least 3 minutes importance of smoking cessation and health benefits, including adverse effects to patient's health and organs.  Encouraged cessation.            Other Visit Diagnoses         Chronic bilateral low back pain without sciatica        Relevant Medications    baclofen (LIORESAL) 10 MG tablet      Body mass index (BMI) 38.0-38.9, adult        Relevant Orders    Vitamin D            Future Appointments   Date Time Provider Department Center   5/27/2025  8:20 AM Dari Dixon, NP [facility]    7/16/2025  8:15 AM [facility]IDENTS, [facility] GYN [facility] G[facility]   8/20/2025  9:45 AM Stephanie Lassiter PA-C [facility] CARD [facility]      I spent a total of 41 minutes on the day of the visit.  This includes face to face time and non-face to face time preparing to see the patient (eg, review of tests), obtaining and/or reviewing separately obtained history, documenting clinical information in the electronic or other health record, independently interpreting results and communicating results to the patient/family/caregiver, or care coordinator.    Follow up in about 3 months (around 5/24/2025) for with fasting labs before visit.    Signature:  Dari Dixon NP  [facility]  [address] [facility] [address]    Date of encounter: 2/24/25         [1]   Social History  Socioeconomic History    Marital status:      Spouse name: Jude    Number of children: 3    Highest education level: Associate degree: occupational, technical, or vocational program   Occupational History    Occupation: unemployed   Tobacco Use    Smoking status: Every Day     Current packs/day: 0.50     Average packs/day: 0.5 packs/day for 30.6 years (15.3 ttl pk-yrs)     Types: Cigarettes     Start date: 7/12/1994     Passive exposure: Past    Smokeless tobacco: Never   Substance and Sexual Activity    Alcohol use: Not Currently     Alcohol/week: 1.0 standard drink of alcohol     Types: 1 Glasses of wine per week     Comment: monthly    Drug use: Not Currently    Sexual activity: Yes     Partners: Male     Social Drivers of Health     Financial Resource Strain: Low Risk  (12/7/2022)    Overall Financial Resource Strain (CARDIA)     Difficulty of Paying Living Expenses: Not hard at all   Recent Concern: Financial Resource Strain - Medium Risk (9/28/2022)    Overall Financial Resource Strain (CARDIA)     Difficulty of Paying Living Expenses: Somewhat hard   Food Insecurity: No  Food Insecurity (12/7/2022)    Hunger Vital Sign     Worried About Running Out of Food in the Last Year: Never true     Ran Out of Food in the Last Year: Never true   Transportation Needs: No Transportation Needs (12/7/2022)    PRAPARE - Transportation     Lack of Transportation (Medical): No     Lack of Transportation (Non-Medical): No   Physical Activity: Inactive (12/7/2022)    Exercise Vital Sign     Days of Exercise per Week: 0 days     Minutes of Exercise per Session: 0 min   Stress: No Stress Concern Present (12/7/2022)    [employer] [employer] - Occupational Stress Questionnaire     Feeling of Stress : Not at all   Housing Stability: High Risk (12/7/2022)    Housing Stability Vital Sign     Unable to Pay for Housing in the Last Year: Yes     Number of Places Lived in the Last Year: 1     Unstable Housing in the Last Year: No

## 2025-02-24 NOTE — ASSESSMENT & PLAN NOTE
Asymptomatic. Daily weights. LSD. Fluid restrictions. Continue medications. Keep f/u with Cardiologist as scheduled.

## 2025-02-24 NOTE — ASSESSMENT & PLAN NOTE
"Lab Results   Component Value Date    HGBA1C 5.3 11/21/2024     No results found for: "LVCUPVF9S"  CBGs: 80-100s  Hypoglycemia episodes: denies   Microalbumin:   Lab Results   Component Value Date    MICALBCREAT 10.6 11/21/2024     Educated on ADA diet: eliminate/decrease high carbohydrate foods (rice, pasta, bread, potatoes (french fries, chips), candy, sweets, fruit juices, canned fruit, junk food, crackers, carbonated beverages)  Educated on health benefits of at least 5 days/ week of 30 minutes moderate intensity exercise (brisk walking) and 2 or more days/ week of muscle strength activities  Avoid alcohol or tobacco use  Eye exam: fundus photos 2/24/25  Foot exam: 2/24/25  Per recommendations, continue with ACEI/ARB, Daily ASA 81 mg for CV prevention, Statin therapy  Continue with current regimen with increase of Ozempic to 1 mg q7d    "

## 2025-02-24 NOTE — ASSESSMENT & PLAN NOTE
Educated on slowly increasing fiber in diet to include fresh fruits and vegetables (squash, cabbage, lettuce, other greens, beans, and peas), whole grains and oats, nuts, and importance of adequate water intake (6-8 glasses of fluids daily).  Educated on health benefits of at least 5 days/ week of 30 minutes moderate intensity exercise (brisk walking) and 2 or more days/ week of muscle strength activities.  Daily Miralax with 6-8 oz water/juice or stool softener

## 2025-02-24 NOTE — ASSESSMENT & PLAN NOTE
Lab Results   Component Value Date    CHOL 105 08/23/2024     Lab Results   Component Value Date    HDL 42 08/23/2024     Lab Results   Component Value Date    TRIG 69 08/23/2024     Lab Results   Component Value Date    LDL 49.00 (L) 08/23/2024     Avoid tobacco/ alcohol  Follow low fat/low cholesterol diet such as avoid/ decrease hwang, sausage, fried foods, cookies, cakes, chips, cheese, whole milk, butter, mayonnaise. Add olive oil, avocados, lean meats, fresh fruits/ vegetables, heart healthy nuts to diet.  Educated on health benefits of exercise 5 days/ week of at least 30 minutes moderate intensity exercise (brisk walking) and 2 days/ week of muscle strength activities  Daily ASA 81 mg for CV prevention  Continue current medication regimen

## 2025-02-25 ENCOUNTER — TELEPHONE (OUTPATIENT)
Dept: INTERNAL MEDICINE | Facility: CLINIC | Age: 65
End: 2025-02-25
Payer: MEDICARE

## 2025-02-25 NOTE — TELEPHONE ENCOUNTER
----- Message from Dario Camacho, OD sent at 2/24/2025  3:18 PM CST -----  No diabetic retinopathy.  Needs eye exam in 1 year

## 2025-02-25 NOTE — TELEPHONE ENCOUNTER
Please inform patient no diabetic retinopathy noted on eye photos. Recommend screening in a year.     Thank you

## 2025-05-12 DIAGNOSIS — E11.9 TYPE 2 DIABETES MELLITUS WITHOUT COMPLICATION, WITHOUT LONG-TERM CURRENT USE OF INSULIN: ICD-10-CM

## 2025-05-12 RX ORDER — METFORMIN HYDROCHLORIDE 500 MG/1
500 TABLET ORAL 2 TIMES DAILY WITH MEALS
Qty: 180 TABLET | Refills: 1 | OUTPATIENT
Start: 2025-05-12 | End: 2025-11-08

## 2025-05-12 NOTE — TELEPHONE ENCOUNTER
Pt's pharmacy requesting refill for pt's metFORMIN (GLUCOPHAGE) 500 MG tablet           LOV: 2/24/25    NOV:  5/27/25

## 2025-05-14 ENCOUNTER — PATIENT MESSAGE (OUTPATIENT)
Dept: INTERNAL MEDICINE | Facility: CLINIC | Age: 65
End: 2025-05-14
Payer: MEDICARE

## 2025-05-16 DIAGNOSIS — E11.9 TYPE 2 DIABETES MELLITUS WITHOUT COMPLICATION, WITHOUT LONG-TERM CURRENT USE OF INSULIN: ICD-10-CM

## 2025-05-19 RX ORDER — METFORMIN HYDROCHLORIDE 500 MG/1
500 TABLET ORAL 2 TIMES DAILY WITH MEALS
Qty: 180 TABLET | Refills: 0 | Status: SHIPPED | OUTPATIENT
Start: 2025-05-19 | End: 2025-11-15

## 2025-05-23 ENCOUNTER — LAB VISIT (OUTPATIENT)
Dept: LAB | Facility: HOSPITAL | Age: 65
End: 2025-05-23
Attending: NURSE PRACTITIONER
Payer: MEDICARE

## 2025-05-23 DIAGNOSIS — E11.9 TYPE 2 DIABETES MELLITUS WITHOUT COMPLICATION, WITHOUT LONG-TERM CURRENT USE OF INSULIN: ICD-10-CM

## 2025-05-23 DIAGNOSIS — I10 PRIMARY HYPERTENSION: ICD-10-CM

## 2025-05-23 DIAGNOSIS — E66.01 MORBID OBESITY: ICD-10-CM

## 2025-05-23 DIAGNOSIS — E78.5 HYPERLIPIDEMIA LDL GOAL <70: ICD-10-CM

## 2025-05-23 DIAGNOSIS — I50.9 CONGESTIVE HEART FAILURE, UNSPECIFIED HF CHRONICITY, UNSPECIFIED HEART FAILURE TYPE: ICD-10-CM

## 2025-05-23 LAB
25(OH)D3+25(OH)D2 SERPL-MCNC: 13 NG/ML (ref 30–80)
ALBUMIN SERPL-MCNC: 3.6 G/DL (ref 3.4–4.8)
ALBUMIN/GLOB SERPL: 0.8 RATIO (ref 1.1–2)
ALP SERPL-CCNC: 99 UNIT/L (ref 40–150)
ALT SERPL-CCNC: 8 UNIT/L (ref 0–55)
ANION GAP SERPL CALC-SCNC: 6 MEQ/L
AST SERPL-CCNC: 9 UNIT/L (ref 11–45)
BASOPHILS # BLD AUTO: 0.04 X10(3)/MCL
BASOPHILS NFR BLD AUTO: 0.6 %
BILIRUB SERPL-MCNC: 0.4 MG/DL
BUN SERPL-MCNC: 9 MG/DL (ref 9.8–20.1)
CALCIUM SERPL-MCNC: 9.5 MG/DL (ref 8.4–10.2)
CHLORIDE SERPL-SCNC: 110 MMOL/L (ref 98–107)
CO2 SERPL-SCNC: 25 MMOL/L (ref 23–31)
CREAT SERPL-MCNC: 0.69 MG/DL (ref 0.55–1.02)
CREAT/UREA NIT SERPL: 13
EOSINOPHIL # BLD AUTO: 0.17 X10(3)/MCL (ref 0–0.9)
EOSINOPHIL NFR BLD AUTO: 2.5 %
ERYTHROCYTE [DISTWIDTH] IN BLOOD BY AUTOMATED COUNT: 16.1 % (ref 11.5–17)
EST. AVERAGE GLUCOSE BLD GHB EST-MCNC: 91.1 MG/DL
GFR SERPLBLD CREATININE-BSD FMLA CKD-EPI: >60 ML/MIN/1.73/M2
GLOBULIN SER-MCNC: 4.3 GM/DL (ref 2.4–3.5)
GLUCOSE SERPL-MCNC: 81 MG/DL (ref 82–115)
HBA1C MFR BLD: 4.8 %
HCT VFR BLD AUTO: 39.4 % (ref 37–47)
HGB BLD-MCNC: 12.3 G/DL (ref 12–16)
IMM GRANULOCYTES # BLD AUTO: 0.02 X10(3)/MCL (ref 0–0.04)
IMM GRANULOCYTES NFR BLD AUTO: 0.3 %
LYMPHOCYTES # BLD AUTO: 2.54 X10(3)/MCL (ref 0.6–4.6)
LYMPHOCYTES NFR BLD AUTO: 36.9 %
MCH RBC QN AUTO: 25.5 PG (ref 27–31)
MCHC RBC AUTO-ENTMCNC: 31.2 G/DL (ref 33–36)
MCV RBC AUTO: 81.6 FL (ref 80–94)
MONOCYTES # BLD AUTO: 0.45 X10(3)/MCL (ref 0.1–1.3)
MONOCYTES NFR BLD AUTO: 6.5 %
NEUTROPHILS # BLD AUTO: 3.66 X10(3)/MCL (ref 2.1–9.2)
NEUTROPHILS NFR BLD AUTO: 53.2 %
NRBC BLD AUTO-RTO: 0 %
PLATELET # BLD AUTO: 278 X10(3)/MCL (ref 130–400)
PMV BLD AUTO: 10.5 FL (ref 7.4–10.4)
POTASSIUM SERPL-SCNC: 3.8 MMOL/L (ref 3.5–5.1)
PROT SERPL-MCNC: 7.9 GM/DL (ref 5.8–7.6)
RBC # BLD AUTO: 4.83 X10(6)/MCL (ref 4.2–5.4)
SODIUM SERPL-SCNC: 141 MMOL/L (ref 136–145)
TSH SERPL-ACNC: 1.18 UIU/ML (ref 0.35–4.94)
WBC # BLD AUTO: 6.88 X10(3)/MCL (ref 4.5–11.5)

## 2025-05-23 PROCEDURE — 85025 COMPLETE CBC W/AUTO DIFF WBC: CPT

## 2025-05-23 PROCEDURE — 36415 COLL VENOUS BLD VENIPUNCTURE: CPT

## 2025-05-23 PROCEDURE — 82306 VITAMIN D 25 HYDROXY: CPT

## 2025-05-23 PROCEDURE — 80053 COMPREHEN METABOLIC PANEL: CPT

## 2025-05-23 PROCEDURE — 83036 HEMOGLOBIN GLYCOSYLATED A1C: CPT

## 2025-05-23 PROCEDURE — 84443 ASSAY THYROID STIM HORMONE: CPT

## 2025-06-16 ENCOUNTER — OFFICE VISIT (OUTPATIENT)
Dept: CARDIOLOGY | Facility: CLINIC | Age: 65
End: 2025-06-16
Payer: MEDICARE

## 2025-06-16 VITALS
OXYGEN SATURATION: 99 % | HEART RATE: 78 BPM | HEIGHT: 68 IN | WEIGHT: 241.38 LBS | BODY MASS INDEX: 36.58 KG/M2 | TEMPERATURE: 98 F | DIASTOLIC BLOOD PRESSURE: 74 MMHG | RESPIRATION RATE: 18 BRPM | SYSTOLIC BLOOD PRESSURE: 122 MMHG

## 2025-06-16 DIAGNOSIS — I10 PRIMARY HYPERTENSION: ICD-10-CM

## 2025-06-16 DIAGNOSIS — I50.9 CONGESTIVE HEART FAILURE, UNSPECIFIED HF CHRONICITY, UNSPECIFIED HEART FAILURE TYPE: ICD-10-CM

## 2025-06-16 DIAGNOSIS — E78.5 HYPERLIPIDEMIA LDL GOAL <70: ICD-10-CM

## 2025-06-16 DIAGNOSIS — I25.10 MILD CAD: ICD-10-CM

## 2025-06-16 DIAGNOSIS — Z72.0 TOBACCO USER: ICD-10-CM

## 2025-06-16 DIAGNOSIS — R07.89 OTHER CHEST PAIN: ICD-10-CM

## 2025-06-16 DIAGNOSIS — I20.0 UNSTABLE ANGINA: Primary | ICD-10-CM

## 2025-06-16 DIAGNOSIS — I50.32 CHRONIC HEART FAILURE WITH PRESERVED EJECTION FRACTION: ICD-10-CM

## 2025-06-16 DIAGNOSIS — R00.2 PALPITATIONS: ICD-10-CM

## 2025-06-16 PROCEDURE — 99215 OFFICE O/P EST HI 40 MIN: CPT | Mod: PBBFAC

## 2025-06-16 RX ORDER — NITROGLYCERIN 0.4 MG/1
0.4 TABLET SUBLINGUAL EVERY 5 MIN PRN
Qty: 25 TABLET | Refills: 3 | Status: SHIPPED | OUTPATIENT
Start: 2025-06-16 | End: 2026-06-16

## 2025-06-16 RX ORDER — HYDRALAZINE HYDROCHLORIDE 50 MG/1
50 TABLET, FILM COATED ORAL 2 TIMES DAILY
Qty: 180 TABLET | Refills: 3 | Status: SHIPPED | OUTPATIENT
Start: 2025-06-16 | End: 2026-06-16

## 2025-06-16 NOTE — PROGRESS NOTES
CHIEF COMPLAINT:   No chief complaint on file.                                                 HPI:  Fernanda Singh 64 y.o. female  with a PMH significant for mild CAD, hypertension, GERD, CKD, chronic tobacco use, obesity, breast mass (2016), uterine CA (2017), and hysterectomy who presents to cardiology clinic for follow up and ongoing care.  At her last office visit patient stated that she felt well overall.  Today the patient states that she feels well from a cardiac standpoint.  Again, she denies any chest pain, SOB, FERNANDES, palpitations, PND, orthopnea, lightheadedness, dizziness, syncope, lower extremity edema, claudication symptoms.  She has some bilateral lower extremity pain that she states radiates from her back.  This pain occurs both with resting and activity.  She states that she would like to walk for exercise, however because of her back pain she is a bit limited.  She states that she does chair exercises at home.  She follows a low-sodium diet.  She reports compliance with all her current medications and she is tolerating them very well.  She continues to smoke approximately 1/4 to 1/2 pack of cigarettes per day, but states that she hopes to eventually quit completely.                                                                                                                                                               CARDIAC TESTIN Hour Holter 22  Underlying rhythm:   Sinus  Arrythmia:  No significant arrhythmia noted     Pauses:  No significant pause noted  Symptoms:  No diary returned    Events:  No patient marked events           Echocardiogram  Results for orders placed during the hospital encounter of 22  Echo  Interpretation Summary  · The left ventricle is normal in size with concentric hypertrophy and normal systolic function. The estimated ejection fraction is 60%.  · Normal right ventricular size with normal right ventricular systolic function.        Stress  Test  Results for orders placed during the hospital encounter of 09/26/22  Nuclear Stress - Cardiology Interpreted  Interpretation Summary    Abnormal myocardial perfusion scan.    There is a moderate intensity, small sized, reversible perfusion abnormality that is consistent with ischemia in the distal anteroseptal and apical wall(s) in the typical distribution of the LAD territory.    There are no other significant perfusion abnormalities.    The gated perfusion images showed an ejection fraction of 59% at rest. The gated perfusion images showed an ejection fraction of 50% post stress.    There is normal wall motion at rest and post stress.    LV cavity size is normal at rest and normal at stress.    The EKG portion of this study is positive for ischemia.    The patient reported no chest pain during the stress test.        Coronary Angiogram  Results for orders placed during the hospital encounter of 09/26/22  Cardiac catheterization  Conclusion    The pre-procedure left ventricular end diastolic pressure was 5.    The estimated blood loss was none.    The coronary arteries were normal..  FINDINGS  LVEDP:  5 mmHg  The patient has No CAD  Blood loss:  less than 15 cc.  RECOMMENDATIONS  Medical management  Weight loss.  Blood pressure control.  Statin therapy.  Activity -- avoid straining with affected limbs for one week  Exercise -- as tolerated  Precautions post D/C -- come to ER for hematoma, unusual pain, erythema, or unusual drainage at access site  Precautions post D/C -- if develop bleeding or hematoma at access site, hold pressure at access site, and come to ER  POST-CATH GUIDELINE FOR INPATIENT DISCHARGE  No hematoma or swelling at access site  No unusual tenderness at access site  Adequate distal pulse or capillary refill in limb(s) accessed  No unusual difficulty with ambulation after bed rest is over        48-hour Holter monitor February 2021:  The patient was in sinus rhythm with an average heart rate  of 77 bpm. Heart rates greater than 120 bpm were noted less than 1% of the time. No episodes of bradycardia was noted. Fine less than 1% of beats were due to PVCs. Short burst of V run lasting for 4 beats at 200 bpm.  Occasional PACs. Episode of SVT lasting for 8 beats at 148 bpm.  No diary was returned.    Echo 2/20  Mild concentric left ventricular hypertrophy.Left ventricular ejection fraction is measured at approximately 50 to 55 %.    Holter (48hr)-11/2016 : Patient was in normal sinus rhythm throughout. Supraventricular ectopic beats consisted of very rare premature beats. Ventricular ectopic beats consisted of very rare unifocal beats.    Left heart cath on September 19, 2016:  Mild CAD  Hypertensive disease  Normal LVEF 60%  Recommendations: Medical management and aggressive risk factor management    ECHO (2015)  EF >55%  Patient Active Problem List   Diagnosis    Anxiety    CHF (congestive heart failure)    Degenerative disc disease, lumbar    Endometrial cancer    Gastroesophageal reflux disease    Primary hypertension    Morbid obesity    Peripheral neuropathy due to and not concurrent with chemotherapy    Diabetes    Tobacco user    Multinodular thyroid    Uterine fibroid    Palpitations    Hyperlipidemia LDL goal <70    Mild CAD    Unstable angina    Chronic heart failure with preserved ejection fraction    Foot callus    Central abdominal mass    Cigarette nicotine dependence without complication    Other constipation     Past Surgical History:   Procedure Laterality Date    ANGIOGRAM, CORONARY, WITH LEFT HEART CATHETERIZATION N/A 9/28/2022    Procedure: Angiogram, Coronary, with Left Heart Cath;  Surgeon: Milton Connolly MD;  Location: [facility] CATH LAB;  Service: Cardiology;  Laterality: N/A;    BILATERAL TUBAL LIGATION      Biopsy Gastrointestinal  11/13/2019    BIOPSY OF THYROID  07/2019    Catheter Inserrtion Mediport      [id number]    CERVICAL CONIZATION   W/ LASER  05/01/2018    COLONOSCOPY   05/10/2019    COLONOSCOPY  09/24/2014    DILATION AND CURETTAGE OF UTERUS  05/01/2018    ESOPHAGOGASTRODUODENOSCOPY  09/24/2014    ESOPHAGOGASTRODUODENOSCOPY  11/13/2019    Left breast small mass removal      Myomectomy Hysteroscopic  05/01/2018    POLYPECTOMY  05/10/2019    POLYPECTOMY  09/24/2014    TUBAL LIGATION       Social History     Socioeconomic History    Marital status:      Spouse name: Jude    Number of children: 3    Highest education level: Associate degree: occupational, technical, or vocational program   Occupational History    Occupation: unemployed   Tobacco Use    Smoking status: Every Day     Current packs/day: 0.50     Average packs/day: 0.5 packs/day for 30.9 years (15.5 ttl pk-yrs)     Types: Cigarettes     Start date: 7/12/1994     Passive exposure: Past    Smokeless tobacco: Never   Substance and Sexual Activity    Alcohol use: Not Currently     Alcohol/week: 1.0 standard drink of alcohol     Types: 1 Glasses of wine per week     Comment: monthly    Drug use: Not Currently    Sexual activity: Yes     Partners: Male     Social Drivers of Health     Financial Resource Strain: Low Risk  (12/7/2022)    Overall Financial Resource Strain (CARDIA)     Difficulty of Paying Living Expenses: Not hard at all   Recent Concern: Financial Resource Strain - Medium Risk (9/28/2022)    Overall Financial Resource Strain (CARDIA)     Difficulty of Paying Living Expenses: Somewhat hard   Food Insecurity: No Food Insecurity (12/7/2022)    Hunger Vital Sign     Worried About Running Out of Food in the Last Year: Never true     Ran Out of Food in the Last Year: Never true   Transportation Needs: No Transportation Needs (12/7/2022)    PRAPARE - Transportation     Lack of Transportation (Medical): No     Lack of Transportation (Non-Medical): No   Physical Activity: Inactive (12/7/2022)    Exercise Vital Sign     Days of Exercise per Week: 0 days     Minutes of Exercise per Session: 0 min   Stress: No  Stress Concern Present (12/7/2022)    [employer] - Occupational Stress Questionnaire     Feeling of Stress : Not at all   Housing Stability: High Risk (12/7/2022)    Housing Stability Vital Sign     Unable to Pay for Housing in the Last Year: Yes     Number of Places Lived in the Last Year: 1     Unstable Housing in the Last Year: No        Family History   Problem Relation Name Age of Onset    Hypertension Mother Mady Andersen     Heart failure Mother Mady Andersen     Kidney failure Mother Mady Andersen     Heart attack Father Leroy Maher     Hypertension Father Leroy Maher     Diabetes Father Leroy Maher     Leukemia Brother Vignesh     Cirrhosis Brother Hay     Hypertension Brother Kofi     Esophageal cancer Brother Kofi      Review of patient's allergies indicates:   Allergen Reactions    Lisinopril Swelling     Other reaction(s): Angioedema of lips         ROS:  Review of Systems   Constitutional: Negative.  Negative for malaise/fatigue.   HENT: Negative.     Eyes: Negative.    Respiratory: Negative.  Negative for shortness of breath.    Cardiovascular: Negative.  Negative for chest pain, palpitations, orthopnea, claudication (Neurogenic), leg swelling and PND.   Gastrointestinal:  Negative for abdominal pain.   Genitourinary: Negative.    Musculoskeletal:  Positive for back pain and joint pain.   Skin: Negative.    Neurological: Negative.  Negative for dizziness and weakness.   Endo/Heme/Allergies: Negative.    Psychiatric/Behavioral: Negative.                                                                                                                                                                                  Negative except as stated in the history of present illness. See HPI for details.    PHYSICAL EXAM:  There were no vitals taken for this visit.        Physical Exam  Constitutional:       Appearance: Normal appearance. She is obese. She  is not ill-appearing.   HENT:      Head: Normocephalic.      Nose: Nose normal.   Eyes:      Extraocular Movements: Extraocular movements intact.   Neck:      Vascular: No carotid bruit.   Cardiovascular:      Rate and Rhythm: Normal rate and regular rhythm.      Pulses: Normal pulses.      Heart sounds: Normal heart sounds. No murmur heard.  Pulmonary:      Effort: Pulmonary effort is normal. No respiratory distress.   Abdominal:      General: There is no distension.   Musculoskeletal:         General: Normal range of motion.      Cervical back: Normal range of motion. Neck rigidity: BMI 36.70.      Right lower leg: No edema.      Left lower leg: No edema.   Skin:     General: Skin is warm.   Neurological:      General: No focal deficit present.      Mental Status: She is alert.   Psychiatric:         Mood and Affect: Mood normal.         Current Outpatient Medications   Medication Instructions    amLODIPine (NORVASC) 10 mg, Oral, Daily    aspirin (ECOTRIN) 81 mg, Oral, Daily    baclofen (LIORESAL) 10 mg, Oral, 3 times daily PRN    carvediloL (COREG) 25 mg, Oral, 2 times daily    dapagliflozin propanediol (FARXIGA) 5 mg, Oral, Daily    diclofenac (VOLTAREN) 75 mg, Oral, 2 times daily PRN    furosemide (LASIX) 40 mg, Oral, Daily    gabapentin (NEURONTIN) 600 mg, Oral, 3 times daily    hydrALAZINE (APRESOLINE) 50 mg, Oral, 2 times daily    losartan (COZAAR) 25 mg, Oral, Daily    metFORMIN (GLUCOPHAGE) 500 mg, Oral, 2 times daily with meals    nitroGLYCERIN (NITROSTAT) 0.4 mg, Sublingual, Every 5 min PRN    ondansetron (ZOFRAN) 4 mg, Oral, Every 12 hours PRN    OZEMPIC 1 mg, Subcutaneous, Every 7 days    rosuvastatin (CRESTOR) 10 mg, Oral, Nightly        All medications, laboratory studies, cardiac diagnostic imaging reviewed.     Lab Results   Component Value Date    LDL 49.00 (L) 08/23/2024    LDL 45.00 (L) 07/20/2023    TRIG 69 08/23/2024    TRIG 73 07/20/2023    CREATININE 0.69 05/23/2025    MG 1.60 09/29/2022     K 3.8 05/23/2025        ASSESSMENT/PLAN:  Mild CAD per [facility] in 2016      - CAD is quiescent       - Asymptomatic since last visit- denies any anginal or anginal equivalent symptoms      - S/p [facility] on 9.28.22 which revealed normal coronary arteries       - Continue aspirin, coreg, and crestor      - Counseled on heart healthy diet, exercise, and smoking cessation    HFpEF      - Asymptomatic       - Denies any heart failure symptoms such as shortness of breath or lower extremity edema or other congestive symptoms      - EF 60% per Echo 9.26.22      - Continue Lasix       - Counseled on BP control and diet and exercise       - Low sodium diet    HTN (hypertension)       - BP at goal 122/74      - Continue Amlodipine, Coreg, hydralazine, Losartan, and Lasix      - Counseled on low salt diet and exercise as tolerated     Palpitations      - Denies any since last visit- no palpitations, lightheadedness, dizziness, fluttering, or tachycardic symptoms      - 48 hour Holter monitor February 2021 - revealed she was in sinus rhythm with an average heart rate of 77 bpm. See report under HPI.       - Repeat Holter December 2022 was WNL     HLD      - LDL at goal - 49 per labs 8.23.24      - Continue Crestor 10mg daily       - Repeat FLP/CMP prior to next visit       - Counseled on low cholesterol diet and exercise as tolerated     DM      - A1C at goal - 4.8 per labs May 2025      - Management per PCP    Tobacco use      - Counseled on smoking cessation      - She currently smokes up to 1/4-1/2 PPD      Complete FLP/CMP prior to next visit   Follow up in cardiology clinic in 6 months or sooner if needed   Follow up with PCP as directed   Please notify clinic if any new concerns or any change in symptoms

## 2025-06-16 NOTE — PATIENT INSTRUCTIONS
Follow up in cardiology clinic in 7 months or sooner if needed   Follow up PCP as directed  Please notify clinic if any new concerns or any change in symptoms

## 2025-07-15 DIAGNOSIS — Z12.11 SCREENING FOR COLON CANCER: Primary | ICD-10-CM

## 2025-07-16 ENCOUNTER — CLINICAL SUPPORT (OUTPATIENT)
Dept: GYNECOLOGY | Facility: CLINIC | Age: 65
End: 2025-07-16
Payer: MEDICARE

## 2025-07-16 VITALS
RESPIRATION RATE: 18 BRPM | DIASTOLIC BLOOD PRESSURE: 85 MMHG | HEART RATE: 76 BPM | TEMPERATURE: 98 F | BODY MASS INDEX: 35.86 KG/M2 | HEIGHT: 68 IN | SYSTOLIC BLOOD PRESSURE: 127 MMHG | WEIGHT: 236.63 LBS | OXYGEN SATURATION: 100 %

## 2025-07-16 DIAGNOSIS — Z12.31 ENCOUNTER FOR SCREENING MAMMOGRAM FOR MALIGNANT NEOPLASM OF BREAST: Primary | ICD-10-CM

## 2025-07-16 DIAGNOSIS — Z03.89 ENCOUNTER FOR OBSERVATION FOR OTHER SUSPECTED DISEASES AND CONDITIONS RULED OUT: ICD-10-CM

## 2025-07-16 DIAGNOSIS — N89.8 VAGINAL DISCHARGE: ICD-10-CM

## 2025-07-16 DIAGNOSIS — C54.1 ENDOMETRIAL CANCER: ICD-10-CM

## 2025-07-16 LAB
CLUE CELLS VAG QL WET PREP: NORMAL
T VAGINALIS VAG QL WET PREP: NORMAL
WBC #/AREA VAG WET PREP: NORMAL
YEAST SPEC QL WET PREP: NORMAL

## 2025-07-16 PROCEDURE — 87210 SMEAR WET MOUNT SALINE/INK: CPT

## 2025-07-16 PROCEDURE — 99214 OFFICE O/P EST MOD 30 MIN: CPT | Mod: PBBFAC

## 2025-07-16 NOTE — PROGRESS NOTES
[facility] GYNECOLOGY CLINIC NOTE     Fernanda Singh is a 64 y.o.  presenting to GYN clinic for endometrial cancer surveillance.    Pt reports:   - She saw surgeons for ventral hernia repair, was told she needed to lose weight before surgery. Has lost 45 lbs in the last year.  - Reports minimal symptoms recently from hernia, attributes it to the weight she worked to lose.   - Denies any urinary, bowel symptoms. Denies vaginal bleeding.    - Reports both second cousins, one with ovarian and one with breast cancer (6, 7 years ago respectively). Dx at ages 60 and 50 respectively.   - Denies discharge, irritation, or other gyn concerns at this time.     Gynecology  OB History          3    Para   3    Term   3       0    AB   0    Living   3         SAB        IAB        Ectopic        Multiple        Live Births                    Past Medical History:   Diagnosis Date    Cancer     CHF (congestive heart failure) 2012    Diabetes mellitus 2019    Disorder of kidney and ureter 2013    GERD (gastroesophageal reflux disease) 2013    Hypertension 2011    Sleep apnea 2013      Oncology History   Endometrial cancer   3/21/2018 Pathology Significant Finding      In office Endosee and EMB at [facility] in [place], unable to see the tubal ostia due to large fibroid. Benign endometrium with stromal breakdown. Separate fragments of dysplastic squamous epithelium, high-grade lesion cannot be ruled out.        2018 Procedure    Hysteroscopy D&C/CKC.:  Mild to moderate dysplasia with endocervical glandular extension.  Mild dysplasia involving the endocervical margin at the 12 o'clock aspect. ECC:  Scant strips of endocervical epithelium and dysplastic squamous epithelium. High-grade lesion cannot be ruled out.. 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      5/15/2018 Initial Diagnosis    Endometrial cancer     5/15/2018  Initial Diagnosis    Endometrial cancer     6/18/2018 Surgery    RA-TLH, BSO, pelvic LN dissection, cysto.  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 The tumor invades the superficial myometrium. Lymph nodes negative     8/2018 -  Radiation Therapy    Completed 3 sessions of HDR brachytherapy to vaginal cuff      8/29/2018 - 10/31/2018 Chemotherapy    Adjuvant Taxol/Carboplatin x4      1/3/2019 - 1/24/2019 Chemotherapy      carbo x 2 (Taxol omitted due to neuropathy)            Past Surgical History:   Procedure Laterality Date    ANGIOGRAM, CORONARY, WITH LEFT HEART CATHETERIZATION N/A 9/28/2022    Procedure: Angiogram, Coronary, with Left Heart Cath;  Surgeon: Milton Connolly MD;  Location: [facility] CATH LAB;  Service: Cardiology;  Laterality: N/A;    BILATERAL TUBAL LIGATION      Biopsy Gastrointestinal  11/13/2019    BIOPSY OF THYROID  07/2019    Catheter Inserrtion Mediport      [id number]    CERVICAL CONIZATION   W/ LASER  05/01/2018    COLONOSCOPY  05/10/2019    COLONOSCOPY  09/24/2014    DILATION AND CURETTAGE OF UTERUS  05/01/2018    ESOPHAGOGASTRODUODENOSCOPY  09/24/2014    ESOPHAGOGASTRODUODENOSCOPY  11/13/2019    Left breast small mass removal      Myomectomy Hysteroscopic  05/01/2018    POLYPECTOMY  05/10/2019    POLYPECTOMY  09/24/2014    TUBAL LIGATION        Current Outpatient Medications   Medication Instructions    amLODIPine (NORVASC) 10 mg, Oral, Daily    aspirin (ECOTRIN) 81 mg, Oral, Daily    baclofen (LIORESAL) 10 mg, Oral, 3 times daily PRN    carvediloL (COREG) 25 mg, Oral, 2 times daily    dapagliflozin propanediol (FARXIGA) 5 mg, Oral, Daily    diclofenac (VOLTAREN) 75 mg, Oral, 2 times daily PRN    furosemide (LASIX) 40 mg, Oral, Daily    gabapentin (NEURONTIN) 600 mg, Oral, 3 times daily    hydrALAZINE (APRESOLINE) 50 mg, Oral, 2 times daily    losartan (COZAAR) 25 mg, Oral, Daily    metFORMIN (GLUCOPHAGE) 500 mg, Oral, 2 times daily with  "meals    nitroGLYCERIN (NITROSTAT) 0.4 mg, Sublingual, Every 5 min PRN    ondansetron (ZOFRAN) 4 mg, Oral, Every 12 hours PRN    OZEMPIC 1 mg, Subcutaneous, Every 7 days    rosuvastatin (CRESTOR) 10 mg, Oral, Nightly     Social History[1]    Review of Systems  Pertinent items are noted in HPI.     Objective:     /85 (BP Location: Left arm, Patient Position: Sitting)   Pulse 76   Temp 97.7 °F (36.5 °C) (Oral)   Resp 18   Ht 5' 8" (1.727 m)   Wt 107.3 kg (236 lb 9.6 oz)   SpO2 100%   BMI 35.97 kg/m²   Physical Exam:  Gen: Well-nourished, well-developed female appearing stated age. Alert, cooperative, in no acute distress.  CV: regular rate and rhythm  Chest: ctabl, no wheezes/rales/rhonchi  Abdomen: Soft, non-tender, no masses.  Extrem: Extremities normal, atraumatic, non-tender calves.  External genitalia: Normal female genetalia without lesion, discharge or tenderness.  Speculum Exam: Vaginal vault with moderate thick white discharge, nonodorous, no lesions/masses seen.  Cervix surgically absent. Cuff intact, no lesions or masses, smooth.   Bimanual Exam: Cervix and uterus surgically absent. No adnexal masses or fullness. No palpable lesions, cuff smooth to palpation and intact.  Nontender exam.   Note: RN chaperone present for entirety of exam.    HCM:   Colonoscopy: 2019- repeat in 3 years- due  Mammogram: 2025- BIRADS 2  DEXA: 2023 WNL  Flu and COVID vaccines UTD.     Assessment:       64 y.o.  here for endometrial cancer surveillance. Now 78 months NEM.     1. Encounter for screening mammogram for malignant neoplasm of breast  Mammo Digital Diagnostic Bilat with Geo (XPD)      2. Encounter for observation for other suspected diseases and conditions ruled out  Mammo Digital Diagnostic Bilat with Geo (XPD)      3. Vaginal discharge  Wet Prep, Genital    CANCELED: POCT Wet Prep      4. 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  Pt asymptomatic for recurrence of disease   Pelvic exam WNL. Scant discharge- wet prep ordered.   No indication for  surveillance   HCM mammogram order placed for 1/2026.     Return to clinic 1 year for endometrial cancer surveillance.     Discussed patient and plan with Dr. Echavarria.     Megan Tucker, DO  [employer] [facility] PGY-4         [1]   Social History  Tobacco Use    Smoking status: Every Day     Current packs/day: 0.50     Average packs/day: 0.5 packs/day for 31.0 years (15.5 ttl pk-yrs)     Types: Cigarettes     Start date: 7/12/1994     Passive exposure: Past    Smokeless tobacco: Never   Substance Use Topics    Alcohol use: Not Currently     Alcohol/week: 1.0 standard drink of alcohol     Types: 1 Glasses of wine per week     Comment: monthly    Drug use: Not Currently

## 2025-07-31 ENCOUNTER — OFFICE VISIT (OUTPATIENT)
Dept: INTERNAL MEDICINE | Facility: CLINIC | Age: 65
End: 2025-07-31
Payer: MEDICARE

## 2025-07-31 VITALS
OXYGEN SATURATION: 99 % | TEMPERATURE: 98 F | DIASTOLIC BLOOD PRESSURE: 73 MMHG | WEIGHT: 241.19 LBS | BODY MASS INDEX: 36.55 KG/M2 | RESPIRATION RATE: 20 BRPM | SYSTOLIC BLOOD PRESSURE: 116 MMHG | HEART RATE: 69 BPM | HEIGHT: 68 IN

## 2025-07-31 DIAGNOSIS — E66.812 CLASS 2 SEVERE OBESITY DUE TO EXCESS CALORIES WITH SERIOUS COMORBIDITY AND BODY MASS INDEX (BMI) OF 36.0 TO 36.9 IN ADULT: ICD-10-CM

## 2025-07-31 DIAGNOSIS — E11.9 TYPE 2 DIABETES MELLITUS WITHOUT COMPLICATION, WITHOUT LONG-TERM CURRENT USE OF INSULIN: Primary | ICD-10-CM

## 2025-07-31 DIAGNOSIS — E66.01 CLASS 2 SEVERE OBESITY DUE TO EXCESS CALORIES WITH SERIOUS COMORBIDITY AND BODY MASS INDEX (BMI) OF 36.0 TO 36.9 IN ADULT: ICD-10-CM

## 2025-07-31 DIAGNOSIS — E78.5 HYPERLIPIDEMIA LDL GOAL <70: ICD-10-CM

## 2025-07-31 DIAGNOSIS — E11.69 TYPE 2 DIABETES MELLITUS WITH OTHER SPECIFIED COMPLICATION, WITHOUT LONG-TERM CURRENT USE OF INSULIN: ICD-10-CM

## 2025-07-31 DIAGNOSIS — E55.9 VITAMIN D DEFICIENCY: ICD-10-CM

## 2025-07-31 DIAGNOSIS — I10 PRIMARY HYPERTENSION: ICD-10-CM

## 2025-07-31 DIAGNOSIS — F17.200 NEEDS SMOKING CESSATION EDUCATION: ICD-10-CM

## 2025-07-31 PROBLEM — C54.1 ENDOMETRIAL CANCER: Status: RESOLVED | Noted: 2018-05-15 | Resolved: 2025-07-31

## 2025-07-31 PROCEDURE — 3044F HG A1C LEVEL LT 7.0%: CPT | Mod: CPTII,,, | Performed by: NURSE PRACTITIONER

## 2025-07-31 PROCEDURE — 99214 OFFICE O/P EST MOD 30 MIN: CPT | Mod: S$PBB,,, | Performed by: NURSE PRACTITIONER

## 2025-07-31 PROCEDURE — 3008F BODY MASS INDEX DOCD: CPT | Mod: CPTII,,, | Performed by: NURSE PRACTITIONER

## 2025-07-31 PROCEDURE — 3074F SYST BP LT 130 MM HG: CPT | Mod: CPTII,,, | Performed by: NURSE PRACTITIONER

## 2025-07-31 PROCEDURE — 3078F DIAST BP <80 MM HG: CPT | Mod: CPTII,,, | Performed by: NURSE PRACTITIONER

## 2025-07-31 PROCEDURE — 1159F MED LIST DOCD IN RCRD: CPT | Mod: CPTII,,, | Performed by: NURSE PRACTITIONER

## 2025-07-31 PROCEDURE — 99214 OFFICE O/P EST MOD 30 MIN: CPT | Mod: PBBFAC | Performed by: NURSE PRACTITIONER

## 2025-07-31 PROCEDURE — 4010F ACE/ARB THERAPY RXD/TAKEN: CPT | Mod: CPTII,,, | Performed by: NURSE PRACTITIONER

## 2025-07-31 RX ORDER — DAPAGLIFLOZIN 5 MG/1
5 TABLET, FILM COATED ORAL DAILY
Qty: 90 TABLET | Refills: 2 | Status: SHIPPED | OUTPATIENT
Start: 2025-07-31

## 2025-07-31 RX ORDER — CHOLECALCIFEROL (VITAMIN D3) 1250 MCG
1250 TABLET ORAL
Qty: 12 TABLET | Refills: 1 | Status: SHIPPED | OUTPATIENT
Start: 2025-07-31

## 2025-07-31 NOTE — ASSESSMENT & PLAN NOTE
"Lab Results   Component Value Date    HGBA1C 4.8 05/23/2025     No results found for: "EITTTMS4F"  CBGs: 80-100s  Hypoglycemia episodes: denies   Microalbumin:   Lab Results   Component Value Date    MICALBCREAT 10.6 11/21/2024     Educated on ADA diet: eliminate/decrease high carbohydrate foods (rice, pasta, bread, potatoes (french fries, chips), candy, sweets, fruit juices, canned fruit, junk food, crackers, carbonated beverages)  Educated on health benefits of at least 5 days/ week of 30 minutes moderate intensity exercise (brisk walking) and 2 or more days/ week of muscle strength activities  Avoid alcohol or tobacco use  Eye exam: fundus photos 2/24/25  Foot exam: 2/24/25  Per recommendations, continue with ACEI/ARB, Daily ASA 81 mg for CV prevention, Statin therapy  Continue with current regimen with increase of Ozempic to 2 mg to be taken once weekly and DC Metformin.   If any hypoglycemia occurs notify provider for sooner appointment     "

## 2025-07-31 NOTE — ASSESSMENT & PLAN NOTE
BP Readings from Last 3 Encounters:   07/31/25 116/73   07/16/25 127/85   06/16/25 122/74     Follow low sodium diet, < 2 gm/day (avoid high salty foods such as processed meats/ sausage/hwang/ sandwich meat, chips, pickles, cheese, crackers and soft drinks/ electrolyte replacement drinks).  Avoid tobacco/ alcohol use  Educated on health benefits of at least 5 days/ week of 30 minutes moderate intensity exercise (brisk walking) and 2 or more days/ week of muscle strength activities  Daily ASA 81 mg for CV prevention  Continue current medication regimen

## 2025-07-31 NOTE — PROGRESS NOTES
Dari L Zack, NP   [facility]  [address] [facility] [address]      PATIENT NAME: Fernanda Singh  : 1960  DATE: 25  MRN: [id number]        History of Present Illness / Problem Focused Workflow     Fernanda Singh presents to the clinic with Follow-up and Labs Only (3 month f/u)     65 yo female for 3 mo follow up/ lab review.  Last seen 2025- initial visit. PMH endometrial cancer (May 2018; chemo/ radiation- completed treatment with chemo in 2019), MP placement. Active diagnosis includes CAD, HFpEF, palpitations, HTN, HLD, DM, lumbar DDD, Neuropathy, obesity, tobacco user.  /73.  Labs completed 2025.  She had to reschedule visit at that time for today.  Vitamin-D 13.  A1c improved to 4.8%.  Denies any hypoglycemia episodes.  Lowest CBGS around 80 per patient.  Continues with weight loss of 15 lb since last visit in February.  Tolerating medication well without side effects.  Patient would like to adjust Ozempic dose and we will stop metformin.  She denies any concerns today.     Other providers  [facility] Cardiology   [facility] Ophthalmology  [facility] GYN/ Oncology/ GYN- [place]     Review of Systems     Review of Systems   Constitutional: Negative.    HENT: Negative.     Eyes: Negative.    Respiratory: Negative.     Cardiovascular: Negative.    Gastrointestinal: Negative.    Endocrine: Negative.    Genitourinary: Negative.    Musculoskeletal: Negative.    Skin: Negative.    Allergic/Immunologic: Negative.    Neurological: Negative.    Hematological: Negative.    Psychiatric/Behavioral: Negative.         Medical / Social / Family History     -------------------------------------    Cancer    CHF (congestive heart failure)    Diabetes mellitus    Disorder of kidney and ureter    Endometrial cancer    Formatting of this note might be different from the original.  Added automatically from request for surgery  [id number]     Last Assessment & Plan:   Formatting of this note might be different from the original.      GERD (gastroesophageal reflux disease)    Hypertension    Sleep apnea        Past Surgical History:   Procedure Laterality Date    ANGIOGRAM, CORONARY, WITH LEFT HEART CATHETERIZATION N/A 9/28/2022    Procedure: Angiogram, Coronary, with Left Heart Cath;  Surgeon: Milton Connolly MD;  Location: [facility] CATH LAB;  Service: Cardiology;  Laterality: N/A;    BILATERAL TUBAL LIGATION      Biopsy Gastrointestinal  11/13/2019    BI[facility] OF [facility]  [id number]    CERVICAL CONIZATION   W/ LASER  05/01/2018    COLONOSCOPY  05/10/2019    COLONOSCOPY  09/24/2014    DILATION AND CURETTAGE OF UTERUS  05/01/2018    ESOPHAGOGASTRODUODENOSCOPY  09/24/2014    ESOPHAGOGASTRODUODENOSCOPY  11/13/2019    Left breast small mass removal      Myomectomy Hysteroscopic  05/01/2018    POLYPECTOMY  05/10/2019    POLYPECTOMY  09/24/2014    TUBAL LIGATION         Social History[1]     Family History   Problem Relation Name Age of Onset    Hypertension Mother Mady Andersen     Heart failure Mother Mady Andersen     Kidney failure Mother Mady Andersen     Heart attack Father Leroy Maher     Hypertension Father Leroy Maher     Diabetes Father Leroy Maher     Leukemia Brother Vignesh     Cirrhosis Brother [place]     Hypertension Brother [place]     Esophageal cancer Brother [place]         Medications and Allergies     Medications  Current Outpatient Medications   Medication Instructions    amLODIPine (NORVASC) 10 mg, Oral, Daily    aspirin (ECOTRIN) 81 mg, Oral, Daily    baclofen (LIORESAL) 10 mg, Oral, 3 times daily PRN    carvediloL (COREG) 25 mg, Oral, 2 times daily    cholecalciferol (vitamin D3) 1,250 mcg, Oral, Every 7 days    dapagliflozin propanediol (FARXIGA) 5 mg, Oral, Daily    diclofenac (VOLTAREN) 75 mg, Oral, 2 times daily PRN    furosemide (LASIX) 40 mg, Oral, Daily     "gabapentin (NEURONTIN) 600 mg, Oral, 3 times daily    hydrALAZINE (APRESOLINE) 50 mg, Oral, 2 times daily    losartan (COZAAR) 25 mg, Oral, Daily    nitroGLYCERIN (NITROSTAT) 0.4 mg, Sublingual, Every 5 min PRN    ondansetron (ZOFRAN) 4 mg, Oral, Every 12 hours PRN    rosuvastatin (CRESTOR) 10 mg, Oral, Nightly    semaglutide (OZEMPIC) 2 mg, Subcutaneous, Every 7 days       Allergies  Review of patient's allergies indicates:   Allergen Reactions    Lisinopril Swelling     Other reaction(s): Angioedema of lips       Physical Examination     Visit Vitals  /73 (BP Location: Right arm, Patient Position: Sitting)   Pulse 69   Temp 98.2 °F (36.8 °C) (Oral)   Resp 20   Ht 5' 8" (1.727 m)   Wt 109.4 kg (241 lb 3.2 oz)   SpO2 99%   BMI 36.67 kg/m²       Physical Exam  Constitutional:       General: She is not in acute distress.     Appearance: Normal appearance. She is obese. She is not ill-appearing or diaphoretic.   HENT:      Head: Normocephalic.   Cardiovascular:      Rate and Rhythm: Normal rate and regular rhythm.      Heart sounds: No murmur heard.  Pulmonary:      Effort: Pulmonary effort is normal. No respiratory distress.      Breath sounds: Normal breath sounds. No stridor. No wheezing, rhonchi or rales.   Skin:     General: Skin is warm and dry.   Neurological:      Mental Status: She is alert and oriented to person, place, and time. Mental status is at baseline.      Coordination: Coordination normal.      Gait: Gait normal.   Psychiatric:         Mood and Affect: Mood normal.         Behavior: Behavior normal.         Thought Content: Thought content normal.         Judgment: Judgment normal.           Results     Lab Results   Component Value Date    WBC 6.88 05/23/2025    RBC 4.83 05/23/2025    HGB 12.3 05/23/2025    HCT 39.4 05/23/2025    MCV 81.6 05/23/2025    MCH 25.5 (L) 05/23/2025    MCHC 31.2 (L) 05/23/2025    RDW 16.1 05/23/2025     05/23/2025    MPV 10.5 (H) 05/23/2025     Sodium   Date " "Value Ref Range Status   05/23/2025 141 136 - 145 mmol/L Final     Potassium   Date Value Ref Range Status   05/23/2025 3.8 3.5 - 5.1 mmol/L Final     Chloride   Date Value Ref Range Status   05/23/2025 110 (H) 98 - 107 mmol/L Final     CO2   Date Value Ref Range Status   05/23/2025 25 23 - 31 mmol/L Final     Glucose   Date Value Ref Range Status   05/23/2025 81 (L) 82 - 115 mg/dL Final     Blood Urea Nitrogen   Date Value Ref Range Status   05/23/2025 9.0 (L) 9.8 - 20.1 mg/dL Final     Creatinine   Date Value Ref Range Status   05/23/2025 0.69 0.55 - 1.02 mg/dL Final     Calcium   Date Value Ref Range Status   05/23/2025 9.5 8.4 - 10.2 mg/dL Final     Protein Total   Date Value Ref Range Status   05/23/2025 7.9 (H) 5.8 - 7.6 gm/dL Final     Albumin   Date Value Ref Range Status   05/23/2025 3.6 3.4 - 4.8 g/dL Final     Bilirubin Total   Date Value Ref Range Status   05/23/2025 0.4 <=1.5 mg/dL Final     ALP   Date Value Ref Range Status   05/23/2025 99 40 - 150 unit/L Final     AST   Date Value Ref Range Status   05/23/2025 9 (L) 11 - 45 unit/L Final     ALT   Date Value Ref Range Status   05/23/2025 8 0 - 55 unit/L Final     Estimated GFR-Non    Date Value Ref Range Status   04/13/2022 84 >=90      Lab Results   Component Value Date    CHOL 105 08/23/2024     Lab Results   Component Value Date    HDL 42 08/23/2024     Lab Results   Component Value Date    TRIG 69 08/23/2024     No components found for: "LDL"  Lab Results   Component Value Date    TSH 1.181 05/23/2025     Lab Results   Component Value Date    PHUR 5.5 01/23/2024    PROTEINUA Trace (A) 01/23/2024    GLUCUA 2+ (A) 01/23/2024    KETONESU Negative 04/13/2022    OCCULTUA Negative 01/23/2024    NITRITE Negative 01/23/2024    LEUKOCYTESUR Negative 01/23/2024     Lab Results   Component Value Date    HGBA1C 4.8 05/23/2025    HGBA1C 5.3 11/21/2024    HGBA1C 5.6 05/22/2024     Lab Results   Component Value Date    MICALBCREAT 10.6 11/21/2024 "        Assessment       ICD-10-CM ICD-9-CM   1. Type 2 diabetes mellitus without complication, without long-term current use of insulin  E11.9 250.00   2. Type 2 diabetes mellitus with other specified complication, without long-term current use of insulin  E11.69 250.80   3. Primary hypertension  I10 401.9   4. Hyperlipidemia LDL goal <70  E78.5 272.4   5. Class 2 severe obesity due to excess calories with serious comorbidity and body mass index (BMI) of 36.0 to 36.9 in adult  E66.812 278.01    E66.01 V85.36    Z68.36    6. Vitamin D deficiency  E55.9 268.9       Plan       Problem List Items Addressed This Visit          Cardiac/Vascular    Hyperlipidemia LDL goal <70    Current Assessment & Plan   Lab Results   Component Value Date    CHOL 105 08/23/2024     Lab Results   Component Value Date    HDL 42 08/23/2024     Lab Results   Component Value Date    TRIG 69 08/23/2024     Lab Results   Component Value Date    LDLCALC 49.00 (L) 08/23/2024     Avoid tobacco/ alcohol  Follow low fat/low cholesterol diet such as avoid/ decrease hwang, sausage, fried foods, cookies, cakes, chips, cheese, whole milk, butter, mayonnaise. Add olive oil, avocados, lean meats, fresh fruits/ vegetables, heart healthy nuts to diet.  Educated on health benefits of exercise 5 days/ week of at least 30 minutes moderate intensity exercise (brisk walking) and 2 days/ week of muscle strength activities  Daily ASA 81 mg for CV prevention  Continue current medication regimen           Relevant Orders    Vitamin D    CBC Auto Differential    Comprehensive Metabolic Panel    Hemoglobin A1C    Microalbumin/Creatinine Ratio, Urine    Lipid Panel    TSH    Primary hypertension    Overview     Currently on Hydralazine 50mg BID, Carvedilol 25mg BID, Amlodipine 10mg every day, Losartan 25 mg po daily         Current Assessment & Plan   BP Readings from Last 3 Encounters:   07/31/25 116/73   07/16/25 127/85   06/16/25 122/74     Follow low sodium diet, <  "2 gm/day (avoid high salty foods such as processed meats/ sausage/hwang/ sandwich meat, chips, pickles, cheese, crackers and soft drinks/ electrolyte replacement drinks).  Avoid tobacco/ alcohol use  Educated on health benefits of at least 5 days/ week of 30 minutes moderate intensity exercise (brisk walking) and 2 or more days/ week of muscle strength activities  Daily ASA 81 mg for CV prevention  Continue current medication regimen           Relevant Orders    Vitamin D    CBC Auto Differential    Comprehensive Metabolic Panel    Hemoglobin A1C    Microalbumin/Creatinine Ratio, Urine    Lipid Panel    TSH       Endocrine    Class 2 severe obesity due to excess calories with serious comorbidity and body mass index (BMI) of 36.0 to 36.9 in adult    Current Assessment & Plan   BMI 36.67, wt loss 15# since last visit- tolerating Ozempic well, wants adjust med  Educated on increased risk of disease s/t obesity.  Educated on health benefits of at least 5 days/ week of 30 minutes moderate intensity exercise (brisk walking) and 2 or more days/ week of muscle strength activities (as tolerated).  Eat well balanced diet of fresh fruits/ vegetables, whole grains, lean meats and limit high carbohydrate foods.              Relevant Orders    Vitamin D    CBC Auto Differential    Comprehensive Metabolic Panel    Hemoglobin A1C    Microalbumin/Creatinine Ratio, Urine    Lipid Panel    TSH    Type 2 diabetes mellitus with other specified complication, without long-term current use of insulin - Primary    Current Assessment & Plan   Lab Results   Component Value Date    HGBA1C 4.8 05/23/2025     No results found for: "SUTYUJX8S"  CBGs: 80-100s  Hypoglycemia episodes: denies   Microalbumin:   Lab Results   Component Value Date    MICALBCREAT 10.6 11/21/2024     Educated on ADA diet: eliminate/decrease high carbohydrate foods (rice, pasta, bread, potatoes (french fries, chips), candy, sweets, fruit juices, canned fruit, junk food, " crackers, carbonated beverages)  Educated on health benefits of at least 5 days/ week of 30 minutes moderate intensity exercise (brisk walking) and 2 or more days/ week of muscle strength activities  Avoid alcohol or tobacco use  Eye exam: fundus photos 2/24/25  Foot exam: 2/24/25  Per recommendations, continue with ACEI/ARB, Daily ASA 81 mg for CV prevention, Statin therapy  Continue with current regimen with increase of Ozempic to 2 mg to be taken once weekly and DC Metformin.   If any hypoglycemia occurs notify provider for sooner appointment            Relevant Medications    semaglutide (OZEMPIC) 2 mg/dose (8 mg/3 mL) PnIj    dapagliflozin propanediol (FARXIGA) 5 mg Tab tablet    Vitamin D deficiency    Current Assessment & Plan   Lab Results   Component Value Date    [id number] 13 (L) 05/23/2025   Rx vitamin D 3 98941 IU once weekly x 12 weeks, 1 refill  Educated on increasing foods high in Vitamin D such as mushrooms, fish oil, cod liver, salmon, tuna, egg yolks, milk fortified with Vitamin D and foods fortified with Vitamin D such as cereals and oatmeal.  Daily supplementation of Vitamin D 2000 IU daily in addition to Calcium supplementation 1000 mg- 1200 mg daily.           Relevant Medications    cholecalciferol, vitamin D3, 1,250 mcg (50,000 unit) Tab    Other Relevant Orders    Vitamin D    Comprehensive Metabolic Panel       Future Appointments   Date Time Provider Department Center   1/21/2026  9:15 AM Consuelo Null FNP [facility]   2/2/2026  8:20 AM Dari Dixon NP [facility]   7/17/2026 10:15 AM [facility], [facility] GYN [facility] GYN [place] [facility]        Follow up in about 6 months (around 1/31/2026) for HTN, HLD, DM with fasting labs before visit .    Signature:  Dari Dixon NP  [facility]  [address] [facility] [address]    Date of encounter: 7/31/25       [1]   Social History  Socioeconomic  History    Marital status:      Spouse name: Jude    Number of children: 3    Highest education level: Associate degree: occupational, technical, or vocational program   Occupational History    Occupation: unemployed   Tobacco Use    Smoking status: Every Day     Current packs/day: 0.50     Average packs/day: 0.5 packs/day for 31.1 years (15.5 ttl pk-yrs)     Types: Cigarettes     Start date: 7/12/1994     Passive exposure: Past    Smokeless tobacco: Never   Substance and Sexual Activity    Alcohol use: Not Currently     Alcohol/week: 1.0 standard drink of alcohol     Types: 1 Glasses of wine per week     Comment: monthly    Drug use: Not Currently    Sexual activity: Yes     Partners: Male     Social Drivers of Health     Financial Resource Strain: Low Risk  (12/7/2022)    Overall Financial Resource Strain (CARDIA)     Difficulty of Paying Living Expenses: Not hard at all   Recent Concern: Financial Resource Strain - Medium Risk (9/28/2022)    Overall Financial Resource Strain (CARDIA)     Difficulty of Paying Living Expenses: Somewhat hard   Food Insecurity: No Food Insecurity (12/7/2022)    Hunger Vital Sign     Worried About Running Out of Food in the Last Year: Never true     Ran Out of Food in the Last Year: Never true   Transportation Needs: No Transportation Needs (12/7/2022)    PRAPARE - Transportation     Lack of Transportation (Medical): No     Lack of Transportation (Non-Medical): No   Physical Activity: Inactive (12/7/2022)    Exercise Vital Sign     Days of Exercise per Week: 0 days     Minutes of Exercise per Session: 0 min   Stress: No Stress Concern Present (12/7/2022)    [employer] [place] [employer] - Occupational Stress Questionnaire     Feeling of Stress : Not at all   Housing Stability: High Risk (12/7/2022)    Housing Stability Vital Sign     Unable to Pay for Housing in the Last Year: Yes     Number of Places Lived in the Last Year: 1     Unstable Housing in the Last Year:  No

## 2025-07-31 NOTE — ASSESSMENT & PLAN NOTE
Lab Results   Component Value Date    CHOL 105 08/23/2024     Lab Results   Component Value Date    HDL 42 08/23/2024     Lab Results   Component Value Date    TRIG 69 08/23/2024     Lab Results   Component Value Date    LDLCALC 49.00 (L) 08/23/2024     Avoid tobacco/ alcohol  Follow low fat/low cholesterol diet such as avoid/ decrease hwang, sausage, fried foods, cookies, cakes, chips, cheese, whole milk, butter, mayonnaise. Add olive oil, avocados, lean meats, fresh fruits/ vegetables, heart healthy nuts to diet.  Educated on health benefits of exercise 5 days/ week of at least 30 minutes moderate intensity exercise (brisk walking) and 2 days/ week of muscle strength activities  Daily ASA 81 mg for CV prevention  Continue current medication regimen

## 2025-07-31 NOTE — ASSESSMENT & PLAN NOTE
BMI 36.67, wt loss 15# since last visit- tolerating Ozempic well, wants adjust med  Educated on increased risk of disease s/t obesity.  Educated on health benefits of at least 5 days/ week of 30 minutes moderate intensity exercise (brisk walking) and 2 or more days/ week of muscle strength activities (as tolerated).  Eat well balanced diet of fresh fruits/ vegetables, whole grains, lean meats and limit high carbohydrate foods.

## 2025-07-31 NOTE — ASSESSMENT & PLAN NOTE
Lab Results   Component Value Date    [id number] 13 (L) 05/23/2025   Rx vitamin D 3 02218 IU once weekly x 12 weeks, 1 refill  Educated on increasing foods high in Vitamin D such as mushrooms, fish oil, cod liver, salmon, tuna, egg yolks, milk fortified with Vitamin D and foods fortified with Vitamin D such as cereals and oatmeal.  Daily supplementation of Vitamin D 2000 IU daily in addition to Calcium supplementation 1000 mg- 1200 mg daily.

## 2025-08-29 RX ORDER — SODIUM, POTASSIUM,MAG SULFATES 17.5-3.13G
SOLUTION, RECONSTITUTED, ORAL ORAL
Qty: 1 KIT | Refills: 0 | Status: SHIPPED | OUTPATIENT
Start: 2025-08-29

## (undated) DEVICE — INTRODUCER TRNSRADIAL 6F 10CM

## (undated) DEVICE — HEMOSTAT VASC BAND X-LONG 29CM

## (undated) DEVICE — OMNIPAQUE 350MG 150ML VIAL

## (undated) DEVICE — DRSNG POLYSKIN TRNSPAR 4X4.75

## (undated) DEVICE — GUIDEWIRE SAFE T .035IN 180CM

## (undated) DEVICE — CATH OPTITORQUE RADIAL 5FR

## (undated) DEVICE — COVER CIV-FLEX PROBE US 58IN

## (undated) DEVICE — NDL BLUNT FILL 18G 1IN